# Patient Record
Sex: FEMALE | Race: WHITE | Employment: FULL TIME | URBAN - METROPOLITAN AREA
[De-identification: names, ages, dates, MRNs, and addresses within clinical notes are randomized per-mention and may not be internally consistent; named-entity substitution may affect disease eponyms.]

---

## 2017-01-27 ENCOUNTER — TELEPHONE (OUTPATIENT)
Dept: ORTHOPEDIC SURGERY | Age: 47
End: 2017-01-27

## 2017-05-22 ENCOUNTER — OFFICE VISIT (OUTPATIENT)
Dept: URGENT CARE | Age: 47
End: 2017-05-22

## 2017-05-22 VITALS
HEIGHT: 68 IN | SYSTOLIC BLOOD PRESSURE: 118 MMHG | HEART RATE: 77 BPM | OXYGEN SATURATION: 99 % | DIASTOLIC BLOOD PRESSURE: 74 MMHG | WEIGHT: 193 LBS | BODY MASS INDEX: 29.25 KG/M2 | TEMPERATURE: 97.7 F

## 2017-05-22 DIAGNOSIS — K12.1 STOMATITIS: Primary | ICD-10-CM

## 2017-05-22 PROCEDURE — 99202 OFFICE O/P NEW SF 15 MIN: CPT | Performed by: PHYSICIAN ASSISTANT

## 2017-05-23 ASSESSMENT — ENCOUNTER SYMPTOMS
HEARTBURN: 0
NAUSEA: 0
SORE THROAT: 0
ABDOMINAL PAIN: 0
VOMITING: 0

## 2017-09-28 ENCOUNTER — TELEPHONE (OUTPATIENT)
Dept: INTERNAL MEDICINE CLINIC | Age: 47
End: 2017-09-28

## 2018-10-31 ENCOUNTER — OFFICE VISIT (OUTPATIENT)
Dept: FAMILY MEDICINE CLINIC | Age: 48
End: 2018-10-31
Payer: COMMERCIAL

## 2018-10-31 VITALS
TEMPERATURE: 98.1 F | SYSTOLIC BLOOD PRESSURE: 128 MMHG | HEART RATE: 77 BPM | BODY MASS INDEX: 28.49 KG/M2 | OXYGEN SATURATION: 96 % | WEIGHT: 188 LBS | HEIGHT: 68 IN | DIASTOLIC BLOOD PRESSURE: 60 MMHG

## 2018-10-31 DIAGNOSIS — G89.29 CHRONIC PAIN OF LEFT THUMB: ICD-10-CM

## 2018-10-31 DIAGNOSIS — Z76.89 ENCOUNTER TO ESTABLISH CARE: Primary | ICD-10-CM

## 2018-10-31 DIAGNOSIS — G43.109 MIGRAINE WITH AURA AND WITHOUT STATUS MIGRAINOSUS, NOT INTRACTABLE: ICD-10-CM

## 2018-10-31 DIAGNOSIS — M25.512 CHRONIC LEFT SHOULDER PAIN: ICD-10-CM

## 2018-10-31 DIAGNOSIS — M79.645 CHRONIC PAIN OF LEFT THUMB: ICD-10-CM

## 2018-10-31 DIAGNOSIS — Z12.39 SCREENING FOR BREAST CANCER: ICD-10-CM

## 2018-10-31 DIAGNOSIS — Z13.1 SCREENING FOR DIABETES MELLITUS: ICD-10-CM

## 2018-10-31 DIAGNOSIS — E66.3 OVERWEIGHT (BMI 25.0-29.9): ICD-10-CM

## 2018-10-31 DIAGNOSIS — G89.29 CHRONIC LEFT SHOULDER PAIN: ICD-10-CM

## 2018-10-31 LAB
A/G RATIO: 1.5 (ref 1.1–2.2)
ALBUMIN SERPL-MCNC: 4.5 G/DL (ref 3.4–5)
ALP BLD-CCNC: 93 U/L (ref 40–129)
ALT SERPL-CCNC: 28 U/L (ref 10–40)
ANION GAP SERPL CALCULATED.3IONS-SCNC: 15 MMOL/L (ref 3–16)
AST SERPL-CCNC: 24 U/L (ref 15–37)
BILIRUB SERPL-MCNC: 0.4 MG/DL (ref 0–1)
BUN BLDV-MCNC: 14 MG/DL (ref 7–20)
CALCIUM SERPL-MCNC: 10 MG/DL (ref 8.3–10.6)
CHLORIDE BLD-SCNC: 104 MMOL/L (ref 99–110)
CHOLESTEROL, TOTAL: 249 MG/DL (ref 0–199)
CO2: 25 MMOL/L (ref 21–32)
CREAT SERPL-MCNC: 0.6 MG/DL (ref 0.6–1.1)
ESTIMATED AVERAGE GLUCOSE: 108.3 MG/DL
GFR AFRICAN AMERICAN: >60
GFR NON-AFRICAN AMERICAN: >60
GLOBULIN: 3 G/DL
GLUCOSE BLD-MCNC: 91 MG/DL (ref 70–99)
HBA1C MFR BLD: 5.4 %
HDLC SERPL-MCNC: 53 MG/DL (ref 40–60)
LDL CHOLESTEROL CALCULATED: 163 MG/DL
POTASSIUM SERPL-SCNC: 4.2 MMOL/L (ref 3.5–5.1)
SODIUM BLD-SCNC: 144 MMOL/L (ref 136–145)
TOTAL PROTEIN: 7.5 G/DL (ref 6.4–8.2)
TRIGL SERPL-MCNC: 163 MG/DL (ref 0–150)
VLDLC SERPL CALC-MCNC: 33 MG/DL

## 2018-10-31 PROCEDURE — 99203 OFFICE O/P NEW LOW 30 MIN: CPT | Performed by: FAMILY MEDICINE

## 2018-10-31 PROCEDURE — 36415 COLL VENOUS BLD VENIPUNCTURE: CPT | Performed by: FAMILY MEDICINE

## 2018-10-31 RX ORDER — SUMATRIPTAN 100 MG/1
TABLET, FILM COATED ORAL
Qty: 9 TABLET | Refills: 0 | Status: SHIPPED | OUTPATIENT
Start: 2018-10-31 | End: 2019-01-22 | Stop reason: SDUPTHER

## 2018-10-31 RX ORDER — ACETAMINOPHEN, ASPIRIN AND CAFFEINE 250; 250; 65 MG/1; MG/1; MG/1
1 TABLET, FILM COATED ORAL EVERY 6 HOURS PRN
COMMUNITY
End: 2022-08-12

## 2018-10-31 ASSESSMENT — ENCOUNTER SYMPTOMS
SORE THROAT: 0
ROS SKIN COMMENTS: ROSACEA
DIARRHEA: 0
SHORTNESS OF BREATH: 0
BLOOD IN STOOL: 0
NAUSEA: 0
BACK PAIN: 1
VOMITING: 0
RHINORRHEA: 0
ABDOMINAL PAIN: 0
CONSTIPATION: 0

## 2018-10-31 NOTE — PATIENT INSTRUCTIONS
under license by [facility] [address] If you have questions about a medical condition or this instruction, always ask your healthcare professional. [place] [address] any warranty or liability for your use of this information.

## 2018-10-31 NOTE — PROGRESS NOTES
done in 2016. Tdap and flu shot up to date. Will screen for lipids, diabetes today, get CMP based on BMI. Will review lab results with patient, return annually and for acute visits PRN. 1. Encounter to establish care  Chart reviewed, history and physical performed    2. Migraine with aura and without status migrainosus, not intractable  Rare since modified diet/caffiene intake; takes imitrex sparingly now; uses excedrin migraine; continue for now  - SUMAtriptan (IMITREX) 100 MG tablet; Take 0.5 tablet to tablet every 2 hours; max 2 tablets in 24 hours  Dispense: 9 tablet; Refill: 0    3. Screening for diabetes mellitus  Based on ADA/USPSTF recommendation for those overweight or obese 40-69 y/o every 3 years  - Hemoglobin A1C    4. Chronic pain of left thumb  Monitor for now, described in HPI; consider ortho referral if persists/worsens    5. Chronic left shoulder pain  Previously given CSI injection and improved, has improved with yoga/exercise    6. Overweight (BMI 25.0-29. 9)  Patient was asked about her current diet and exercise habits, and personalized advice was provided regarding recommended lifestyle changes. Patient's comorbid health conditions associated with elevated BMI were discussed, including migraines, as well as the likely benefits of weight loss. Based upon patient's motivation to continue  her behavior of exercise. The following plan was agreed upon to work toward a weight loss goal. 250-500 less calorie/day diet and increase physical activity as follows: continue yoga/ barre class 2-3 x a week. Educational materials for  weight loss were provided. Patient will follow-up in 12 month(s) with PCP. Provider spent 5 minutes counseling patient. - Comprehensive Metabolic Panel  - LIPID PANEL    7. Screening for breast cancer  Mammogram ordered, has required ultrasound follow up in past, will await results  - PAU DIGITAL SCREEN W OR WO CAD BILATERAL;  Future    Reviewed treatment plan with

## 2018-11-09 ENCOUNTER — PATIENT MESSAGE (OUTPATIENT)
Dept: FAMILY MEDICINE CLINIC | Age: 48
End: 2018-11-09

## 2018-11-09 DIAGNOSIS — J02.9 PHARYNGITIS, UNSPECIFIED ETIOLOGY: Primary | ICD-10-CM

## 2018-11-09 RX ORDER — AMOXICILLIN 500 MG/1
1000 TABLET, FILM COATED ORAL DAILY
Qty: 20 TABLET | Refills: 0 | Status: SHIPPED | OUTPATIENT
Start: 2018-11-09 | End: 2018-11-19

## 2019-01-22 DIAGNOSIS — G43.109 MIGRAINE WITH AURA AND WITHOUT STATUS MIGRAINOSUS, NOT INTRACTABLE: ICD-10-CM

## 2019-01-22 RX ORDER — SUMATRIPTAN 100 MG/1
TABLET, FILM COATED ORAL
Qty: 9 TABLET | Refills: 0 | Status: SHIPPED | OUTPATIENT
Start: 2019-01-22 | End: 2019-06-24 | Stop reason: SDUPTHER

## 2019-06-03 ENCOUNTER — OFFICE VISIT (OUTPATIENT)
Dept: PRIMARY CARE CLINIC | Age: 49
End: 2019-06-03
Payer: COMMERCIAL

## 2019-06-03 ENCOUNTER — HOSPITAL ENCOUNTER (OUTPATIENT)
Dept: GENERAL RADIOLOGY | Age: 49
Discharge: HOME | End: 2019-06-03
Payer: COMMERCIAL

## 2019-06-03 ENCOUNTER — HOSPITAL ENCOUNTER (OUTPATIENT)
Age: 49
Discharge: HOME | End: 2019-06-03
Payer: COMMERCIAL

## 2019-06-03 VITALS
SYSTOLIC BLOOD PRESSURE: 133 MMHG | RESPIRATION RATE: 12 BRPM | HEART RATE: 84 BPM | HEIGHT: 68 IN | DIASTOLIC BLOOD PRESSURE: 76 MMHG | WEIGHT: 202 LBS | TEMPERATURE: 96.9 F | BODY MASS INDEX: 30.62 KG/M2

## 2019-06-03 DIAGNOSIS — S50.12XA CONTUSION OF LEFT FOREARM, INITIAL ENCOUNTER: Primary | ICD-10-CM

## 2019-06-03 DIAGNOSIS — S50.12XA CONTUSION OF LEFT FOREARM, INITIAL ENCOUNTER: ICD-10-CM

## 2019-06-03 PROCEDURE — 73090 X-RAY EXAM OF FOREARM: CPT

## 2019-06-03 PROCEDURE — 99203 OFFICE O/P NEW LOW 30 MIN: CPT | Performed by: PHYSICIAN ASSISTANT

## 2019-06-03 ASSESSMENT — ENCOUNTER SYMPTOMS
COUGH: 0
SORE THROAT: 0
EYE REDNESS: 0
ABDOMINAL PAIN: 0
DIARRHEA: 0
EYE PAIN: 0
SINUS PRESSURE: 0
NAUSEA: 0
WHEEZING: 0
BACK PAIN: 0
SHORTNESS OF BREATH: 0

## 2019-06-03 NOTE — PROGRESS NOTES
appears well-developed and well-nourished. Musculoskeletal:        Arms:  Neurological: She is alert and oriented to person, place, and time. Skin: Skin is warm and dry. Psychiatric: She has a normal mood and affect. Assessment:    1. Contusion of left forearm, initial encounter        Plan:    X-ray of L forearm ordered and done at [facility] = negative for acute fracture. ACE wrap applied.   RICE and f/u prn

## 2019-06-20 ENCOUNTER — OFFICE VISIT (OUTPATIENT)
Dept: PRIMARY CARE CLINIC | Age: 49
End: 2019-06-20
Payer: COMMERCIAL

## 2019-06-20 VITALS
HEIGHT: 68 IN | TEMPERATURE: 98 F | DIASTOLIC BLOOD PRESSURE: 81 MMHG | SYSTOLIC BLOOD PRESSURE: 127 MMHG | BODY MASS INDEX: 30.65 KG/M2 | HEART RATE: 85 BPM | WEIGHT: 202.2 LBS | OXYGEN SATURATION: 97 %

## 2019-06-20 DIAGNOSIS — K04.7 DENTAL ABSCESS: Primary | ICD-10-CM

## 2019-06-20 PROCEDURE — 99213 OFFICE O/P EST LOW 20 MIN: CPT | Performed by: PHYSICIAN ASSISTANT

## 2019-06-20 RX ORDER — AMOXICILLIN AND CLAVULANATE POTASSIUM 875; 125 MG/1; MG/1
1 TABLET, FILM COATED ORAL 2 TIMES DAILY
Qty: 14 TABLET | Refills: 0 | Status: SHIPPED | OUTPATIENT
Start: 2019-06-20 | End: 2019-06-27

## 2019-06-20 ASSESSMENT — ENCOUNTER SYMPTOMS: FACIAL SWELLING: 1

## 2019-06-20 NOTE — PATIENT INSTRUCTIONS
Patient Education        Abscessed Tooth: Care Instructions  Your Care Instructions    An abscessed tooth is a tooth that has a pocket of pus in the tissues around it. Pus forms when the body tries to fight an infection caused by bacteria. If the pus cannot drain, it forms an abscess. An abscessed tooth can cause red, swollen gums and throbbing pain, especially when you chew. You may have a bad taste in your mouth and a fever, and your jaw may swell. Damage to the tooth, untreated tooth decay, or gum disease can cause an abscessed tooth. An abscessed tooth needs to be treated by a dental professional right away. If it is not treated, the infection could spread to other parts of your body. Your dentist will give you antibiotics to stop the infection. He or she may make a hole in the tooth or cut open (hemal) the abscess inside your mouth so that the infection can drain, which should relieve your pain. You may need to have a root canal treatment, which tries to save your tooth by taking out the infected pulp and replacing it with a healing medicine and/or a filling. If these treatments do not work, your tooth may have to be removed. Follow-up care is a key part of your treatment and safety. Be sure to make and go to all appointments, and call your doctor if you are having problems. It's also a good idea to know your test results and keep a list of the medicines you take. How can you care for yourself at home? · Reduce pain and swelling in your face and jaw by putting ice or a cold pack on the outside of your cheek for 10 to 20 minutes at a time. Put a thin cloth between the ice and your skin. · Take pain medicines exactly as directed. ? If the doctor gave you a prescription medicine for pain, take it as prescribed. ? If you are not taking a prescription pain medicine, ask your doctor if you can take an over-the-counter medicine. · Take your antibiotics as directed.  Do not stop taking them just because you feel better. You need to take the full course of antibiotics. To prevent tooth abscess  · Brush and floss every day, and have regular dental checkups. · Eat a healthy diet, and avoid sugary foods and drinks. · Do not smoke, use e-cigarettes with nicotine, or use spit tobacco. Tobacco and nicotine slow your ability to heal. Tobacco also increases your risk for gum disease and cancer of the mouth and throat. If you need help quitting, talk to your doctor about stop-smoking programs and medicines. These can increase your chances of quitting for good. When should you call for help? Call 911 anytime you think you may need emergency care. For example, call if:    · You have trouble breathing.    Call your doctor now or seek immediate medical care if:    · You have new or worse symptoms of infection, such as:  ? Increased pain, swelling, warmth, or redness. ? Red streaks leading from the area. ? Pus draining from the area. ? A fever.    Watch closely for changes in your health, and be sure to contact your doctor if:    · You do not get better as expected. Where can you learn more? Go to https://Thinknum.AMCAD. org and sign in to your The Campaign Solution account. Enter N211 in the [facility] box to learn more about \"Abscessed Tooth: Care Instructions. \"     If you do not have an account, please click on the \"Sign Up Now\" link. Current as of: October 3, 2018  Content Version: 12.0  © 5466-1022 Healthwise, Incorporated. Care instructions adapted under license by [facility] ([facility]). If you have questions about a medical condition or this instruction, always ask your healthcare professional. [place] [address] any warranty or liability for your use of this information.

## 2019-06-20 NOTE — PROGRESS NOTES
Reason for Visit:   Chief Complaint   Patient presents with    Oral Swelling     x 2 days     Patient History     Pt c/o pain of L cheek/ gum pain that started the day before yesterday. She has an appt with her dentist but was unable to get in today. She feels that she may have an abscess forming as she also has some swelling. Denies fever or chills. Past Medical History:   Diagnosis Date    Migraines     uses imitrex as needed       Past Surgical History:   Procedure Laterality Date    ABDOMEN SURGERY      APPENDECTOMY  1983    BREAST REDUCTION SURGERY  12-5-2012    AMBER BREAST REDUCTION; ABDOMINOPLASTY    ENDOMETRIAL ABLATION  2007    MOUTH SURGERY  1988       Allergies: Allergies   Allergen Reactions    Oxycodone-Acetaminophen Itching    Oxycodone-Acetaminophen Itching       Review of Systems     ROS: Please refer to HPI for anypertinent positive findings, as all other systems were reviewed as negative unless otherwise listed above. Review of Systems   Constitutional: Negative for chills and fever. HENT: Positive for dental problem and facial swelling. Skin: Negative for rash. Hematological: Negative for adenopathy. Physical Exam     Vitals:    06/20/19 0823   BP: 127/81   Pulse: 85   Temp: 98 °F (36.7 °C)   SpO2: 97%         Physical Exam   Constitutional: She is oriented to person, place, and time. She appears well-developed and well-nourished. HENT:   Head:       Mouth/Throat: Oropharynx is clear and moist. No oral lesions. No oropharyngeal exudate. Neurological: She is alert and oriented to person, place, and time. Psychiatric: She has a normal mood and affect. Assessment:    1. Dental abscess  - amoxicillin-clavulanate (AUGMENTIN) 875-125 MG per tablet; Take 1 tablet by mouth 2 times daily for 7 days  Dispense: 14 tablet;  Refill: 0      Plan:    F/u w/ Dentist as planned, here prn

## 2019-09-19 DIAGNOSIS — G43.109 MIGRAINE WITH AURA AND WITHOUT STATUS MIGRAINOSUS, NOT INTRACTABLE: ICD-10-CM

## 2019-09-19 RX ORDER — SUMATRIPTAN 100 MG/1
TABLET, FILM COATED ORAL
Qty: 9 TABLET | Refills: 0 | Status: SHIPPED | OUTPATIENT
Start: 2019-09-19 | End: 2019-11-25 | Stop reason: SDUPTHER

## 2019-11-25 DIAGNOSIS — G43.109 MIGRAINE WITH AURA AND WITHOUT STATUS MIGRAINOSUS, NOT INTRACTABLE: ICD-10-CM

## 2019-11-25 RX ORDER — SUMATRIPTAN 100 MG/1
100 TABLET, FILM COATED ORAL
Qty: 9 TABLET | Refills: 0 | Status: SHIPPED | OUTPATIENT
Start: 2019-11-25 | End: 2020-01-30 | Stop reason: SDUPTHER

## 2020-01-30 ENCOUNTER — OFFICE VISIT (OUTPATIENT)
Dept: FAMILY MEDICINE CLINIC | Age: 50
End: 2020-01-30
Payer: COMMERCIAL

## 2020-01-30 VITALS
OXYGEN SATURATION: 98 % | HEART RATE: 85 BPM | WEIGHT: 203 LBS | TEMPERATURE: 98.5 F | HEIGHT: 68 IN | SYSTOLIC BLOOD PRESSURE: 110 MMHG | BODY MASS INDEX: 30.77 KG/M2 | DIASTOLIC BLOOD PRESSURE: 60 MMHG

## 2020-01-30 PROBLEM — M79.645 CHRONIC THUMB PAIN, LEFT: Status: ACTIVE | Noted: 2020-01-30

## 2020-01-30 PROBLEM — G89.29 CHRONIC THUMB PAIN, LEFT: Status: ACTIVE | Noted: 2020-01-30

## 2020-01-30 PROCEDURE — 99214 OFFICE O/P EST MOD 30 MIN: CPT | Performed by: FAMILY MEDICINE

## 2020-01-30 RX ORDER — SUMATRIPTAN 100 MG/1
TABLET, FILM COATED ORAL
Qty: 9 TABLET | Refills: 0 | Status: SHIPPED | OUTPATIENT
Start: 2020-01-30 | End: 2020-04-01

## 2020-01-30 ASSESSMENT — PATIENT HEALTH QUESTIONNAIRE - PHQ9
SUM OF ALL RESPONSES TO PHQ QUESTIONS 1-9: 0
SUM OF ALL RESPONSES TO PHQ QUESTIONS 1-9: 0
SUM OF ALL RESPONSES TO PHQ9 QUESTIONS 1 & 2: 0
1. LITTLE INTEREST OR PLEASURE IN DOING THINGS: 0
2. FEELING DOWN, DEPRESSED OR HOPELESS: 0

## 2020-01-30 ASSESSMENT — ENCOUNTER SYMPTOMS
SORE THROAT: 0
SHORTNESS OF BREATH: 0
ABDOMINAL PAIN: 0
COLOR CHANGE: 0
EYE PAIN: 0
BACK PAIN: 0
DIARRHEA: 0
CONSTIPATION: 0
BLOOD IN STOOL: 0
EYE DISCHARGE: 0
RHINORRHEA: 0
WHEEZING: 0

## 2020-01-30 NOTE — PATIENT INSTRUCTIONS
stay in the shade or cover up with clothing and a hat with a wide brim. Wear sunglasses that block UV rays. Even when it's cloudy, put broad-spectrum sunscreen (SPF 30 or higher) on any exposed skin. · See a dentist one or two times a year for checkups and to have your teeth cleaned. · Wear a seat belt in the car. Follow your doctor's advice about when to have certain tests. These tests can spot problems early. For everyone  · Cholesterol. Have the fat (cholesterol) in your blood tested after age 21. Your doctor will tell you how often to have this done based on your age, family history, or other things that can increase your risk for heart disease. · Blood pressure. Have your blood pressure checked during a routine doctor visit. Your doctor will tell you how often to check your blood pressure based on your age, your blood pressure results, and other factors. · Vision. Talk with your doctor about how often to have a glaucoma test.  · Diabetes. Ask your doctor whether you should have tests for diabetes. · Colon cancer. Your risk for colorectal cancer gets higher as you get older. Some experts say that adults should start regular screening at age 48 and stop at age 76. Others say to start before age 48 or continue after age 76. Talk with your doctor about your risk and when to start and stop screening. For women  · Breast exam and mammogram. Talk to your doctor about when you should have a clinical breast exam and a mammogram. Medical experts differ on whether and how often women under 50 should have these tests. Your doctor can help you decide what is right for you. · Cervical cancer screening test and pelvic exam. Begin with a Pap test at age 24. The test often is part of a pelvic exam. Starting at age 27, you may choose to have a Pap test, an HPV test, or both tests at the same time (called co-testing). Talk with your doctor about how often to have testing.   · Tests for sexually transmitted infections (STIs). Ask whether you should have tests for STIs. You may be at risk if you have sex with more than one person, especially if your partners do not wear condoms. For men  · Tests for sexually transmitted infections (STIs). Ask whether you should have tests for STIs. You may be at risk if you have sex with more than one person, especially if you do not wear a condom. · Testicular cancer exam. Ask your doctor whether you should check your testicles regularly. · Prostate exam. Talk to your doctor about whether you should have a blood test (called a PSA test) for prostate cancer. Experts differ on whether and when men should have this test. Some experts suggest it if you are older than 39 and are -American or have a father or brother who got prostate cancer when he was younger than 72. When should you call for help? Watch closely for changes in your health, and be sure to contact your doctor if you have any problems or symptoms that concern you. Where can you learn more? Go to https://Diatherix Laboratoriestosin.healthHadapt. org and sign in to your Infoblox account. Enter P072 in the [employer] box to learn more about \"Well Visit, Ages 25 to 48: Care Instructions. \"     If you do not have an account, please click on the \"Sign Up Now\" link. Current as of: August 21, 2019  Content Version: 12.3  © 8926-1092 Healthwise, Incorporated. Care instructions adapted under license by [facility] ([facility]). If you have questions about a medical condition or this instruction, always ask your healthcare professional. [place] [address] any warranty or liability for your use of this information.

## 2020-01-30 NOTE — PROGRESS NOTES
Dementia Mother     Heart Disease Father     Cancer Maternal Aunt     Cancer Paternal Aunt     Cancer Brother         esophageal and stomach CA x 6 years, multiple myeloma       Current Outpatient Medications   Medication Sig Dispense Refill    SUMAtriptan (IMITREX) 100 MG tablet TAKE ONE - HALF TABLET BY MOUTH EVERY TWO HOURS AS NEEDED. MAX 2 TABLETS IN 24 HOURS 9 tablet 0    aspirin-acetaminophen-caffeine (EXCEDRIN MIGRAINE) 633-236-55 MG per tablet Take 1 tablet by mouth every 6 hours as needed for Headaches       No current facility-administered medications for this visit. Allergies   Allergen Reactions    Oxycodone-Acetaminophen Itching    Oxycodone-Acetaminophen Itching       Social History     Socioeconomic History    Marital status:      Spouse name: Not on file    Number of children: Not on file    Years of education: Not on file    Highest education level: Not on file   Occupational History    Not on file   Social Needs    Financial resource strain: Not on file    Food insecurity:     Worry: Not on file     Inability: Not on file    Transportation needs:     Medical: Not on file     Non-medical: Not on file   Tobacco Use    Smoking status: Never Smoker    Smokeless tobacco: Never Used   Substance and Sexual Activity    Alcohol use:  Yes     Alcohol/week: 0.0 standard drinks     Comment: socially occasionally    Drug use: No    Sexual activity: Yes     Partners: Male   Lifestyle    Physical activity:     Days per week: Not on file     Minutes per session: Not on file    Stress: Not on file   Relationships    Social connections:     Talks on phone: Not on file     Gets together: Not on file     Attends Worship service: Not on file     Active member of club or organization: Not on file     Attends meetings of clubs or organizations: Not on file     Relationship status: Not on file    Intimate partner violence:     Fear of current or ex partner: Not on file about her diagnoses and treatment plans, preventative health, and other matters suggested an appointment today    Reviewed treatment plan with patient. Patient verbalized understanding to treatment plan and questions were answered. Return in about 1 year (around 1/30/2021) for annual well exam, adam. Barbara Farah.  Alireza Thapa.      1/30/2020

## 2020-02-10 ENCOUNTER — OFFICE VISIT (OUTPATIENT)
Dept: SURGERY | Age: 50
End: 2020-02-10
Payer: COMMERCIAL

## 2020-02-10 VITALS
HEIGHT: 68 IN | HEART RATE: 72 BPM | SYSTOLIC BLOOD PRESSURE: 111 MMHG | WEIGHT: 198 LBS | BODY MASS INDEX: 30.01 KG/M2 | DIASTOLIC BLOOD PRESSURE: 69 MMHG

## 2020-02-10 PROCEDURE — 99241 PR OFFICE CONSULTATION NEW/ESTAB PATIENT 15 MIN: CPT | Performed by: SURGERY

## 2020-02-10 NOTE — PROGRESS NOTES
New Patient Via Jalil Nickerson MD    [address], [address] [facility]  [place]  [phone]    Destiny Domínguez   YOB: 1970    Date of Visit:  2/10/2020    Celestine Orellana. Apryl Penriccardo., DO    Chief Complaint: Scalp lumps    HPI: Presents for evaluation of a couple lumps on her scalp. She states she has had these before. She had some removed in the past.  They have slowly grown larger over the course of the past few years and are now bothersome to her. She has a pressure type pain over these. They have not been infected or drained. Allergies   Allergen Reactions    Oxycodone-Acetaminophen Itching    Oxycodone-Acetaminophen Itching     Outpatient Medications Marked as Taking for the 2/10/20 encounter (Office Visit) with Kimberli Shaw MD   Medication Sig Dispense Refill    SUMAtriptan (IMITREX) 100 MG tablet TAKE ONE - HALF TABLET BY MOUTH EVERY TWO HOURS AS NEEDED.  MAX 2 TABLETS IN 24 HOURS 9 tablet 0    aspirin-acetaminophen-caffeine (EXCEDRIN MIGRAINE) 653-033-32 MG per tablet Take 1 tablet by mouth every 6 hours as needed for Headaches         Past Medical History:   Diagnosis Date    Migraines     uses imitrex as needed     Past Surgical History:   Procedure Laterality Date    ABDOMEN SURGERY      APPENDECTOMY  1983    BREAST REDUCTION SURGERY  12-5-2012    AMBER BREAST REDUCTION; ABDOMINOPLASTY    ENDOMETRIAL ABLATION  2007    MOUTH SURGERY  1988     Family History   Problem Relation Age of Onset    Heart Disease Mother         4 stents, aortic valve replacement     Dementia Mother     Heart Disease Father     Cancer Maternal Aunt     Cancer Paternal Aunt     Cancer Brother         esophageal and stomach CA x 6 years, multiple myeloma     Social History     Socioeconomic History    Marital status:      Spouse name: Not on file    Number of children: Not on file    Years of education: nose normal. Neck- normal range of motion, no tenderness, supple   Respiratory:  No respiratory distress, normal breath sounds, no rales, no wheezing   Cardiovascular:  Normal rate, normal rhythm  Integument: Scalp demonstrates 2 sebaceous cyst.  Each measured about 1.5 cm  Lymphatic:  No lymphadenopathy noted   Neurologic:  Alert & oriented x 3, no focal deficits noted   Psychiatric:  Speech and behavior appropriate       DATA:  N/A      Assessment:  1. Sebaceous cyst        Plan: Excision of sebaceous cysts. I explained the procedure including risks, benefits, and alternatives. Questions were answered and the patient agrees to proceed.

## 2020-02-12 ENCOUNTER — HOSPITAL ENCOUNTER (OUTPATIENT)
Dept: MAMMOGRAPHY | Age: 50
Discharge: HOME | End: 2020-02-12
Payer: COMMERCIAL

## 2020-02-12 ENCOUNTER — TELEPHONE (OUTPATIENT)
Dept: SURGERY | Age: 50
End: 2020-02-12

## 2020-02-12 PROCEDURE — 77063 BREAST TOMOSYNTHESIS BI: CPT

## 2020-02-12 NOTE — TELEPHONE ENCOUNTER
I called and spoke to pt, she is fine with scheduling with Dr. Abbasi Neighbor, she is ok coming here to [place], she has 2 scalp cysts. Thanks.

## 2020-02-12 NOTE — TELEPHONE ENCOUNTER
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 She has two cysts on her head. Lorenzo Ghanshyam has taken some off before. She didn't want to come out to [facility], so I saw her in office today. I explained that it is less of a hassle to have them removed in specialized services, and she is fine with that. If a Tuesday doesn't work for her, it is fine if Lorenzo Morrissey does it on one of his days. That may even be better, so she can talk to him about using Dragon!  Need the standard 20 minutes local for 2 scalp cysts

## 2020-02-12 NOTE — TELEPHONE ENCOUNTER
Dr. Sonya Mchugh, please see Dr. Billingsley Model message below. Is this ok to schedule with you?  If so, do you want to see her in the office first?

## 2020-02-20 PROBLEM — L72.9 SCALP CYST: Status: ACTIVE | Noted: 2020-02-20

## 2020-05-14 ENCOUNTER — OFFICE VISIT (OUTPATIENT)
Dept: PRIMARY CARE CLINIC | Age: 50
End: 2020-05-14

## 2020-05-14 NOTE — PATIENT INSTRUCTIONS
Advance Care Planning  People with COVID-19 may have no symptoms, mild symptoms, such as fever, cough, and shortness of breath or they may have more severe illness, developing severe and fatal pneumonia. As a result, Advance Care Planning with attention to naming a health care decision maker (someone you trust to make healthcare decisions for you if you could not speak for yourself) and sharing other health care preferences is important BEFORE a possible health crisis. Please contact your Primary Care Provider to discuss Advance Care Planning. Preventing the Spread of Coronavirus Disease 2019 in Homes and Residential Communities  For the most recent information go to [email or web address]    Prevention steps for People with confirmed or suspected COVID-19 (including persons under investigation) who do not need to be hospitalized  and   People with confirmed COVID-19 who were hospitalized and determined to be medically stable to go home    Your healthcare provider and public health staff will evaluate whether you can be cared for at home. If it is determined that you do not need to be hospitalized and can be isolated at home, you will be monitored by staff from your local or state health department. You should follow the prevention steps below until a healthcare provider or local or state health department says you can return to your normal activities. Stay home except to get medical care  People who are mildly ill with COVID-19 are able to isolate at home during their illness. You should restrict activities outside your home, except for getting medical care. Do not go to work, school, or public areas. Avoid using public transportation, ride-sharing, or taxis. Separate yourself from other people and animals in your home  People: As much as possible, you should stay in a specific room and away from other people in your home.  Also, you should use a separate have a medical emergency and need to call 911, notify the dispatch personnel that you have, or are being evaluated for COVID-19. If possible, put on a facemask before emergency medical services arrive. Discontinuing home isolation  Patients with confirmed COVID-19 should remain under home isolation precautions until the risk of secondary transmission to others is thought to be low. The decision to discontinue home isolation precautions should be made on a case-by-case basis, in consultation with healthcare providers and state and local health departments. Thank you for enrolling in [address] Please follow the instructions below to securely access your online medical record. IntellinX allows you to send messages to your doctor, view your test results, renew your prescriptions, schedule appointments, and more. How Do I Sign Up? 1. In your Internet browser, go to [email or web address]  2. Click on the Sign Up Now link in the Sign In box. You will see the New Member Sign Up page. 3. Enter your IntellinX Access Code exactly as it appears below. You will not need to use this code after youve completed the sign-up process. If you do not sign up before the expiration date, you must request a new code. IntellinX Access Code: Activation code not generated  Current IntellinX Status: Active    4. Enter your Social Security Number ([id number]) and Date of Birth (mm/dd/yyyy) as indicated and click Submit. You will be taken to the next sign-up page. 5. Create a IntellinX ID. This will be your IntellinX login ID and cannot be changed, so think of one that is secure and easy to remember. 6. Create a IntellinX password. You can change your password at any time. 7. Enter your Password Reset Question and Answer. This can be used at a later time if you forget your password. 8. Enter your e-mail address. You will receive e-mail notification when new information is available in [address] 9. Click Sign Up.

## 2020-05-14 NOTE — PROGRESS NOTES
Patient is having a Cyst Removal wi Dr. Alma Villarreal on 5/18/2020 @ [facility] and was seen at clinic for pre op covid test. . Patient instructed to self quarantine until the procedure. 5/14/2020    FLU/COVID-19 CLINIC EVALUATION    HPI: Needs test before surgery  SYMPTOMS: asymptomatic    Symptom duration, days:  [] 1   [] 2   [] 3   [] 4   [] 5   [] 6   [] 7   [] 8   [] 9   [] 10   [] 11   [] 12   [] 13 [] 14 +      Symptom course:   [] Worsening     [] Stable     [] Improving    [] Fevers  [] Symptom (not measured)  [] Measured (Result: )  [] Chills  [] Cough  [] Coughing up blood  [] Productive  [] Dry  [] Chest Congestion  [] Chest Tightness  [] Nasal Congestion  [] Runny Nose  [] Feeling short of breath  [] Fatigue  [] Chest pain  [] Headaches  []Tolerable  [] Severe  [] Sore throat  [] Muscle aches  [] Nausea  [] Decreased appetite  [] Vomiting  []Unable to keep fluids down  [] Diarrhea  []Severe    [] OTHER SYMPTOMS:      RISK FACTORS:  [] Pregnant or possibly pregnant  [] Age over 61  [] Diabetes  [] Heart disease  [] Asthma  [] COPD/Other chronic lung diseases  [] Active Cancer  [] On Chemotherapy  [] Taking oral steroids  [] History Lymphoma/Leukemia  [] Close contact with a lab confirmed COVID-19 patient within 14 days of symptom onset  [] History of travel from affected geographical areas within 14 days of symptom onset  [] Health Care Worker Exposure no symptoms  [] Health Care Worker Exposure symptomatic      VITALS:  There were no vitals filed for this visit.      PHYSICAL EXAMINATION:  [x]Alert   [x]Oriented to person/place/time    [x]No apparent distress     []Toxic appearing    [x]Breathing appears normal     []Appears tachypneic         [x]Speaking in full sentences     TESTS:  POCT FLU:  [] Positive     []Negative  POCT STREP:  [] Positive     []Negative    [x] COVID-19 Test sent: [x] Blue   [] Red   [] Chest X-ray     ASSESSMENT:  [] Flu  [] Strep Throat  [] Uncertain Viral Respiratory Illness  [x] Possible COVID-19  [] Exposure COVID-19  [] Other:     PLAN:  [] Discharge home with written instructions for:  [] Flu management  [] Strep throat management  [] Possible COVID-19 exposure with self-quarantine   [x] Possible COVID-19 with isolation instructions and management of symptoms  [x] Follow-up with primary care physician or emergency department if worsens  [] Referred to emergency department for evaluation    [] Evaluation per physician/nurse practitioner in clinic  [] Prescription sent in  [x] No Prescription sent in    An  electronic signature was used to authenticate this note.      --Garrett Segura ATC on 5/14/2020 at 3:46 PM

## 2020-05-16 LAB — SARS-COV-2, PCR: NOT DETECTED

## 2020-05-18 ENCOUNTER — HOSPITAL ENCOUNTER (OUTPATIENT)
Age: 50
Setting detail: OUTPATIENT SURGERY
Discharge: HOME | End: 2020-05-18
Attending: SURGERY | Admitting: SURGERY
Payer: COMMERCIAL

## 2020-05-18 VITALS
WEIGHT: 190 LBS | SYSTOLIC BLOOD PRESSURE: 117 MMHG | HEART RATE: 69 BPM | HEIGHT: 68 IN | RESPIRATION RATE: 16 BRPM | OXYGEN SATURATION: 97 % | TEMPERATURE: 96.9 F | BODY MASS INDEX: 28.79 KG/M2 | DIASTOLIC BLOOD PRESSURE: 77 MMHG

## 2020-05-18 PROCEDURE — 7100000010 HC PHASE II RECOVERY - FIRST 15 MIN: Performed by: SURGERY

## 2020-05-18 PROCEDURE — 2500000003 HC RX 250 WO HCPCS: Performed by: SURGERY

## 2020-05-18 PROCEDURE — 88305 TISSUE EXAM BY PATHOLOGIST: CPT

## 2020-05-18 PROCEDURE — 11422 EXC H-F-NK-SP B9+MARG 1.1-2: CPT | Performed by: SURGERY

## 2020-05-18 PROCEDURE — 88304 TISSUE EXAM BY PATHOLOGIST: CPT

## 2020-05-18 PROCEDURE — 3600000012 HC SURGERY LEVEL 2 ADDTL 15MIN: Performed by: SURGERY

## 2020-05-18 PROCEDURE — 2709999900 HC NON-CHARGEABLE SUPPLY: Performed by: SURGERY

## 2020-05-18 PROCEDURE — 7100000011 HC PHASE II RECOVERY - ADDTL 15 MIN: Performed by: SURGERY

## 2020-05-18 PROCEDURE — 12031 INTMD RPR S/A/T/EXT 2.5 CM/<: CPT | Performed by: SURGERY

## 2020-05-18 PROCEDURE — 3600000002 HC SURGERY LEVEL 2 BASE: Performed by: SURGERY

## 2020-05-18 PROCEDURE — 11401 EXC TR-EXT B9+MARG 0.6-1 CM: CPT | Performed by: SURGERY

## 2020-05-18 ASSESSMENT — PAIN - FUNCTIONAL ASSESSMENT: PAIN_FUNCTIONAL_ASSESSMENT: 0-10

## 2020-05-18 NOTE — OP NOTE
[address]                 [address] [address]                                OPERATIVE REPORT    PATIENT NAME: AYSHA LOWE                 :        1970  MED REC NO:   [id number]                          ROOM:  ACCOUNT NO:   [de-identified]                           ADMIT DATE: 2020  PROVIDER:     Elena Bassett MD    DATE OF PROCEDURE:  2020    PREOPERATIVE DIAGNOSES:  1.  Scalp cyst x2.  2.  Skin neoplasm, uncertain behavior, left lower extremity. POSTOPERATIVE DIAGNOSES:  1.  Scalp cyst x2.  2.  Skin neoplasm, uncertain behavior, left lower extremity. OPERATIONS PERFORMED:  1. Excision of scalp cyst x2.  2.  Excision skin neoplasm, uncertain behavior, left lower extremity. SURGEON:  Elena Bassett MD    ANESTHESIA:  Local.    COMPLICATIONS:  None. ESTIMATED BLOOD LOSS:  Less than 50 mL. INDICATIONS FOR OPERATION:  A 57-year-old female with enlarging masses  on the scalp. These were causing acute pain and skin sensation  disturbance. She also had a raised irregular, pigmented lesion on the  left lower extremity, it was causing acute pain and skin sensation  disturbance. I recommended operative excision of these areas. The  risks and benefits were explained. The patient understood them,  accepted them, and elected to proceed. DESCRIPTION OF OPERATION:  The patient was brought to the operating  room. She was placed supine on the operating room table. She was  prepped and draped. Local anesthetic was infiltrated at two spots on  the scalp. Incisions were made. The excised diameter was 1.5 cm. I  then removed two scalp cysts in their entirety. Hemostasis was  obtained. Simple closures were performed with 3-0 nylon suture. On the  left lower extremity, in the mid portion between the knee and the ankle,  there was a pigmented lesion. Local anesthetic was infiltrated.   An  elliptical incision was made

## 2020-05-18 NOTE — PROGRESS NOTES
Reviewed discharge instructions with pt, verbalized understanding,  Denies questions at this time. Ambulated off unit without assistance.
baseline levels established preoperatively. 23.  The patient/caregiver demonstrates knowledge of the expected responses to the operative or invasive procedure. 20.  Patient/Caregiver has reduced anxiety. Interventions- Familiarize with environment and equipment.   21. Other:  22. Other:

## 2020-05-19 RX ORDER — SUMATRIPTAN 100 MG/1
TABLET, FILM COATED ORAL
Qty: 9 TABLET | Refills: 0 | Status: SHIPPED | OUTPATIENT
Start: 2020-05-19 | End: 2020-08-18 | Stop reason: SDUPTHER

## 2020-05-20 ENCOUNTER — TELEPHONE (OUTPATIENT)
Dept: SURGERY | Age: 50
End: 2020-05-20

## 2020-05-21 NOTE — TELEPHONE ENCOUNTER
Patient informed. She states she is doing great. She has a niece that is a RN, so she is going to have her remove the sutures. She states she will call if any problems or concerns.

## 2020-08-18 RX ORDER — SUMATRIPTAN 100 MG/1
TABLET, FILM COATED ORAL
Qty: 9 TABLET | Refills: 0 | Status: SHIPPED | OUTPATIENT
Start: 2020-08-18 | End: 2021-01-06 | Stop reason: SDUPTHER

## 2020-08-18 NOTE — TELEPHONE ENCOUNTER
Refill Request SUMAtriptan (IMITREX) 100 MG tablet     Last Seen: 1/30/20    Last Written: 5/19/20 9 tablets with 0 refills    Next Appointment: Do January 2021  No future appointments.         Requested Prescriptions     Pending Prescriptions Disp Refills    SUMAtriptan (IMITREX) 100 MG tablet 9 tablet 0     Sig: TAKE ONE-HALF TO ONE TABLET BY MOUTH EVERY 2 HOURS AS NEEDED MAX 2 TABLETS IN 24 HOURS

## 2021-01-06 ENCOUNTER — OFFICE VISIT (OUTPATIENT)
Dept: PRIMARY CARE CLINIC | Age: 51
End: 2021-01-06
Payer: COMMERCIAL

## 2021-01-06 VITALS
HEIGHT: 68 IN | DIASTOLIC BLOOD PRESSURE: 76 MMHG | TEMPERATURE: 97.6 F | BODY MASS INDEX: 31.46 KG/M2 | SYSTOLIC BLOOD PRESSURE: 118 MMHG | OXYGEN SATURATION: 98 % | WEIGHT: 207.6 LBS | HEART RATE: 82 BPM

## 2021-01-06 DIAGNOSIS — E66.9 OBESITY (BMI 30-39.9): ICD-10-CM

## 2021-01-06 DIAGNOSIS — Z12.11 COLON CANCER SCREENING: ICD-10-CM

## 2021-01-06 DIAGNOSIS — Z00.00 ANNUAL PHYSICAL EXAM: Primary | ICD-10-CM

## 2021-01-06 DIAGNOSIS — G43.109 MIGRAINE WITH AURA AND WITHOUT STATUS MIGRAINOSUS, NOT INTRACTABLE: ICD-10-CM

## 2021-01-06 PROCEDURE — 99396 PREV VISIT EST AGE 40-64: CPT | Performed by: FAMILY MEDICINE

## 2021-01-06 RX ORDER — SUMATRIPTAN 100 MG/1
TABLET, FILM COATED ORAL
Qty: 9 TABLET | Refills: 5 | Status: SHIPPED | OUTPATIENT
Start: 2021-01-06 | End: 2021-12-20

## 2021-01-06 SDOH — SOCIAL STABILITY: SOCIAL NETWORK: HOW OFTEN DO YOU GET TOGETHER WITH FRIENDS OR RELATIVES?: TWICE A WEEK

## 2021-01-06 SDOH — ECONOMIC STABILITY: FOOD INSECURITY: WITHIN THE PAST 12 MONTHS, THE FOOD YOU BOUGHT JUST DIDN'T LAST AND YOU DIDN'T HAVE MONEY TO GET MORE.: NEVER TRUE

## 2021-01-06 SDOH — SOCIAL STABILITY: SOCIAL INSECURITY: WITHIN THE LAST YEAR, HAVE YOU BEEN HUMILIATED OR EMOTIONALLY ABUSED IN OTHER WAYS BY YOUR PARTNER OR EX-PARTNER?: NO

## 2021-01-06 SDOH — HEALTH STABILITY: MENTAL HEALTH
STRESS IS WHEN SOMEONE FEELS TENSE, NERVOUS, ANXIOUS, OR CAN'T SLEEP AT NIGHT BECAUSE THEIR MIND IS TROUBLED. HOW STRESSED ARE YOU?: NOT AT ALL

## 2021-01-06 SDOH — SOCIAL STABILITY: SOCIAL NETWORK: HOW OFTEN DO YOU ATTEND CHURCH OR RELIGIOUS SERVICES?: NEVER

## 2021-01-06 SDOH — HEALTH STABILITY: PHYSICAL HEALTH: ON AVERAGE, HOW MANY DAYS PER WEEK DO YOU ENGAGE IN MODERATE TO STRENUOUS EXERCISE (LIKE A BRISK WALK)?: 3 DAYS

## 2021-01-06 SDOH — SOCIAL STABILITY: SOCIAL INSECURITY
WITHIN THE LAST YEAR, HAVE TO BEEN RAPED OR FORCED TO HAVE ANY KIND OF SEXUAL ACTIVITY BY YOUR PARTNER OR EX-PARTNER?: NO

## 2021-01-06 SDOH — ECONOMIC STABILITY: INCOME INSECURITY: HOW HARD IS IT FOR YOU TO PAY FOR THE VERY BASICS LIKE FOOD, HOUSING, MEDICAL CARE, AND HEATING?: NOT HARD AT ALL

## 2021-01-06 SDOH — ECONOMIC STABILITY: TRANSPORTATION INSECURITY
IN THE PAST 12 MONTHS, HAS LACK OF TRANSPORTATION KEPT YOU FROM MEETINGS, WORK, OR FROM GETTING THINGS NEEDED FOR DAILY LIVING?: NO

## 2021-01-06 SDOH — SOCIAL STABILITY: SOCIAL NETWORK: IN A TYPICAL WEEK, HOW MANY TIMES DO YOU TALK ON THE PHONE WITH FAMILY, FRIENDS, OR NEIGHBORS?: TWICE A WEEK

## 2021-01-06 SDOH — SOCIAL STABILITY: SOCIAL NETWORK
DO YOU BELONG TO ANY CLUBS OR ORGANIZATIONS SUCH AS CHURCH GROUPS UNIONS, FRATERNAL OR ATHLETIC GROUPS, OR SCHOOL GROUPS?: NO

## 2021-01-06 SDOH — SOCIAL STABILITY: SOCIAL NETWORK: HOW OFTEN DO YOU ATTENT MEETINGS OF THE CLUB OR ORGANIZATION YOU BELONG TO?: NEVER

## 2021-01-06 SDOH — SOCIAL STABILITY: SOCIAL INSECURITY
WITHIN THE LAST YEAR, HAVE YOU BEEN KICKED, HIT, SLAPPED, OR OTHERWISE PHYSICALLY HURT BY YOUR PARTNER OR EX-PARTNER?: NO

## 2021-01-06 SDOH — ECONOMIC STABILITY: FOOD INSECURITY: WITHIN THE PAST 12 MONTHS, YOU WORRIED THAT YOUR FOOD WOULD RUN OUT BEFORE YOU GOT MONEY TO BUY MORE.: NEVER TRUE

## 2021-01-06 ASSESSMENT — ENCOUNTER SYMPTOMS
EYE DISCHARGE: 0
DIARRHEA: 0
ABDOMINAL PAIN: 0
SORE THROAT: 0
COLOR CHANGE: 0
RHINORRHEA: 0
SHORTNESS OF BREATH: 0
WHEEZING: 0
EYE PAIN: 0
BACK PAIN: 0
CONSTIPATION: 0
BLOOD IN STOOL: 0

## 2021-01-06 ASSESSMENT — PATIENT HEALTH QUESTIONNAIRE - PHQ9
SUM OF ALL RESPONSES TO PHQ QUESTIONS 1-9: 0
1. LITTLE INTEREST OR PLEASURE IN DOING THINGS: 0
SUM OF ALL RESPONSES TO PHQ QUESTIONS 1-9: 0

## 2021-01-06 NOTE — PROGRESS NOTES
Leslie Victor     1970    Consultants:  Patient Care Team:  Marika Mo. Wesley Larsen DO as PCP - General (Family Medicine)  Marika Mo. Wesley Larsen DO as PCP - [facility] Empaneled Provider    Chief Complaint   Patient presents with    Annual Exam     annual exam    Anxiety     wants support animal letter today    Migraine     excedrin + imitrex PRN    Colon Cancer Screening     c scope at [address] planned     Routine Pap Outreach     last pap ; needs repeat    Routine Mammography Outreach    Health Maintenance     declines hep c/hiv screen    Immunizations     declines shingrix    Obesity     HPI:  Leslie Victor is a 48 y.o. female  who presents for Established Patient with Personalized Prevention Plan Services.  -Here today for annual exam  -had Be Well Screen 2020; pt will get imported to our records  -pt plans to get c scope, pap smear, and mammogram in     Anxiety:  New dog; pt wants letter stating she can take on plane for emotional support today  Was used to going to job every day. 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 Back to yoga studio/barre class. Used to working at home. Saves gas and doesn't have to get dressed. Going to get puppy. Home alone by himself  Has had dog since she was 12 y/o  Dog  last November  Has place in [place] and wants to take her dog.    -History of migraines, thumb pain, shoulder pain, weight gain/obesity:    Left thumb pain:  Wears brace  Essential oil/[place]/CBD oil seem to help    Shoulder pain has improved from past few years    Obesity:  Wt gain back up  -pt going to try to eat betttSongvice, yoga/barre class  Estimated body mass index is 31.57 kg/m² as calculated from the following:    Height as of this encounter: 5' 8\" (1.727 m). Weight as of this encounter: 207 lb 9.6 oz (94.2 kg).   Wt Readings from Last 3 Encounters:   21 207 lb 9.6 oz (94.2 kg)   20 190 lb (86.2 kg)   02/10/20 198 lb (89.8 kg)       Health Maintenance:  Had flu shot + be well visit  Will do colonoscopy this year and PAP smear and mammogram  Pap smear:  Goes to [facility], last was 2016 will get that  -Declines shingles vaccine today and Hep C/HIV screening    Patient Active Problem List   Diagnosis    Rotator cuff tendinitis    Left shoulder pain    Migraines    Obesity (BMI 30-39. 9)    Low back pain    Rosacea    Chronic thumb pain, left    Scalp cyst    Neoplasm of uncertain behavior of skin     Health Maintenance Completed:  Health Maintenance   Topic Date Due    Hepatitis C screen  1970    HIV screen  10/03/1985    Cervical cancer screen  06/23/2019    Flu vaccine (1) 09/01/2020    Shingles Vaccine (1 of 2) 10/03/2020    Colon cancer screen colonoscopy  10/03/2020    Breast cancer screen  02/12/2022    DTaP/Tdap/Td vaccine (2 - Td) 11/01/2022    Lipid screen  10/31/2023    Hepatitis A vaccine  Aged Out    Hepatitis B vaccine  Aged Out    Hib vaccine  Aged Out    Meningococcal (ACWY) vaccine  Aged Out    Pneumococcal 0-64 years Vaccine  Aged [employer] History   Administered Date(s) Administered    Influenza Vaccine, unspecified formulation 10/18/2010    Influenza Virus Vaccine 10/17/2017, 10/30/2018, 10/22/2019    Influenza, Quadv, IM, (6 mo and older Fluzone, Flulaval, Fluarix and 3 yrs and older Afluria) 10/30/2018    Tdap (Boostrix, Adacel) 11/01/2012       Review of Systems   Constitutional: Positive for unexpected weight change. Negative for chills and fever. HENT: Negative for congestion, rhinorrhea and sore throat. Eyes: Negative for pain, discharge and visual disturbance. Respiratory: Negative for shortness of breath and wheezing. Cardiovascular: Negative for chest pain and leg swelling. Gastrointestinal: Negative for abdominal pain, blood in stool, constipation and diarrhea. Endocrine: Negative for polyuria. Genitourinary: Negative for dysuria and flank pain. Musculoskeletal: Positive for arthralgias. Negative for back pain and neck pain. Skin: Negative for color change, rash and wound. Allergic/Immunologic: Negative for environmental allergies, food allergies and immunocompromised state. Neurological: Positive for headaches. Negative for dizziness, speech difficulty, weakness and light-headedness. Hematological: Negative for adenopathy. Does not bruise/bleed easily. Psychiatric/Behavioral: Negative for dysphoric mood and sleep disturbance. The patient is nervous/anxious. Physical Exam:   Vitals:    01/06/21 1109   BP: 118/76   Pulse: 82   Temp: 97.6 °F (36.4 °C)   TempSrc: Temporal   SpO2: 98%   Weight: 207 lb 9.6 oz (94.2 kg)   Height: 5' 8\" (1.727 m)     Body mass index is 31.57 kg/m². Wt Readings from Last 3 Encounters:   01/06/21 207 lb 9.6 oz (94.2 kg)   05/18/20 190 lb (86.2 kg)   02/10/20 198 lb (89.8 kg)       BP Readings from Last 3 Encounters:   01/06/21 118/76   05/18/20 117/77   02/10/20 111/69       Physical Exam  Constitutional:       General: She is not in acute distress. Appearance: She is well-developed. HENT:      Head: Normocephalic and atraumatic. Right Ear: Tympanic membrane normal.      Left Ear: Tympanic membrane normal.      Nose: Nose normal. No rhinorrhea. Mouth/Throat:      Pharynx: Uvula midline. Eyes:      Pupils: Pupils are equal, round, and reactive to light. Neck:      Trachea: No tracheal deviation. Cardiovascular:      Rate and Rhythm: Normal rate and regular rhythm. Heart sounds: Normal heart sounds. No murmur. No friction rub. No gallop. Pulmonary:      Effort: Pulmonary effort is normal. No respiratory distress. Breath sounds: Normal breath sounds. No wheezing or rales. Abdominal:      General: Bowel sounds are normal. There is no distension. Palpations: Abdomen is soft. Tenderness: There is no abdominal tenderness. There is no rebound. Musculoskeletal: Normal range of motion.          General: No tenderness. Lymphadenopathy:      Cervical: No cervical adenopathy. Skin:     General: Skin is warm and dry. Findings: No erythema or rash. Neurological:      Mental Status: She is alert and oriented to person, place, and time. Cranial Nerves: No cranial nerve deficit. Deep Tendon Reflexes:      Reflex Scores:       Tricep reflexes are 1+ on the right side and 1+ on the left side. Bicep reflexes are 1+ on the right side and 1+ on the left side. Brachioradialis reflexes are 1+ on the right side and 1+ on the left side. Patellar reflexes are 1+ on the right side and 1+ on the left side. Psychiatric:         Mood and Affect: Mood is anxious. Speech: Speech normal.         Thought Content: Thought content does not include homicidal or suicidal ideation.         Lab Review: will review Be Well Results when resulted  Lab Results   Component Value Date     10/31/2018    K 4.2 10/31/2018     10/31/2018    CO2 25 10/31/2018    BUN 14 10/31/2018    CREATININE 0.6 10/31/2018    GLUCOSE 91 10/31/2018    CALCIUM 10.0 10/31/2018    PROT 7.5 10/31/2018    LABALBU 4.5 10/31/2018    BILITOT 0.4 10/31/2018    ALKPHOS 93 10/31/2018    AST 24 10/31/2018    ALT 28 10/31/2018    LABGLOM >60 10/31/2018    GFRAA >60 10/31/2018    AGRATIO 1.5 10/31/2018    GLOB 3.0 10/31/2018       Lab Results   Component Value Date    LABA1C 5.4 10/31/2018     Lab Results   Component Value Date    .3 10/31/2018     Lab Results   Component Value Date    WBC 7.8 09/29/2016    HGB 15.1 09/29/2016    HCT 44.9 09/29/2016    MCV 88.0 09/29/2016     09/29/2016       Assessment/Plan:  Beatrice Healy is a 47 y/o femal who was seen today for annual exam, anxiety, migraine, colon cancer screening, routine pap outreach, routine mammography outreach, health maintenance and immunizations review, obesity/weight gain:    Diagnoses and all orders for this visit:    Annual physical exam  -Chart/records reviewed, history and physical performed, health maintenance addressed and updated, presenting problems addressed. Anxiety  Letter written for emotional support animal, see media tab    Migraine with aura and without status migrainosus, not intractable  -     SUMAtriptan (IMITREX) 100 MG tablet; TAKE ONE-HALF TO ONE TABLET BY MOUTH EVERY 2 HOURS AS NEEDED MAX 2 TABLETS IN 24 HOURS    Colon cancer screening  -     COLONOSCOPY (Screening)    Obesity  Body mass index is 31.57 kg/m². Wt Readings from Last 3 Encounters:   01/06/21 207 lb 9.6 oz (94.2 kg)   05/18/20 190 lb (86.2 kg)   02/10/20 198 lb (89.8 kg)       -Recommend 150 minutes of cardiovascular activity a week, or 10,000 to 15,000 steps a day, 2 days of weightbearing  -dietary wise, try to stick to your weight x 10 in calories a day  -avoid processed/refined carbohydrates (boxed/canned/frozen/fast)  -encourage healthy protein and fat, butter, avocado, egg      Health Maintenance Due:  Health Maintenance Due   Topic Date Due    Hepatitis C screen  1970    HIV screen  10/03/1985    Cervical cancer screen  06/23/2019    Flu vaccine (1) 09/01/2020    Shingles Vaccine (1 of 2) 10/03/2020    Colon cancer screen colonoscopy  10/03/2020      Health Maintenance:  (F) Breast Cancer Screen: 2/2020 will bety repeat in 2021  (F) Cervical Cancer Screen: 2016 will go [facility] repeat at [facility]  CRC/Colonoscopy Screening: will get colonoscopy through [facility]  Had flu shot + be well visit  -Declines shingles vaccine today and Hep C/HIV screening    Return in about 1 year (around 1/6/2022). EMR Dragon/transcription disclaimer:  Much of this encounter note is electronic transcription/translation of spoken language to printed texts. The electronic translation of spoken language may be erroneous, or at times, nonsensical words or phrases may be inadvertently transcribed.   Although I have reviewed the note for such errors, some may still yes

## 2021-01-06 NOTE — PATIENT INSTRUCTIONS
-get pap smear, mammogram, colonoscopy in new year    -Recommend 150 minutes of cardiovascular activity a week, or 10,000 to 15,000 steps a day, 2 days of weightbearing  -dietary wise, try to stick to your weight x 10 in calories a day  -avoid processed/refined carbohydrates (boxed/canned/frozen/fast)  -encourage healthy protein and fat, butter, avocado, egg    Patient Education        Well Visit, Ages 25 to 48: Care Instructions  Your Care Instructions     Physical exams can help you stay healthy. Your doctor has checked your overall health and may have suggested ways to take good care of yourself. He or she also may have recommended tests. At home, you can help prevent illness with healthy eating, regular exercise, and other steps. Follow-up care is a key part of your treatment and safety. Be sure to make and go to all appointments, and call your doctor if you are having problems. It's also a good idea to know your test results and keep a list of the medicines you take. How can you care for yourself at home? · Reach and stay at a healthy weight. This will lower your risk for many problems, such as obesity, diabetes, heart disease, and high blood pressure. · Get at least 30 minutes of physical activity on most days of the week. Walking is a good choice. You also may want to do other activities, such as running, swimming, cycling, or playing tennis or team sports. Discuss any changes in your exercise program with your doctor. · Do not smoke or allow others to smoke around you. If you need help quitting, talk to your doctor about stop-smoking programs and medicines. These can increase your chances of quitting for good. · Talk to your doctor about whether you have any risk factors for sexually transmitted infections (STIs). Having one sex partner (who does not have STIs and does not have sex with anyone else) is a good way to avoid these infections.   · Use birth control if you do not want to have children at Pap test, an HPV test, or both tests at the same time (called co-testing). Talk with your doctor about how often to have testing. · Tests for sexually transmitted infections (STIs). Ask whether you should have tests for STIs. You may be at risk if you have sex with more than one person, especially if your partners do not wear condoms. For men  · Tests for sexually transmitted infections (STIs). Ask whether you should have tests for STIs. You may be at risk if you have sex with more than one person, especially if you do not wear a condom. · Testicular cancer exam. Ask your doctor whether you should check your testicles regularly. · Prostate exam. Talk to your doctor about whether you should have a blood test (called a PSA test) for prostate cancer. Experts differ on whether and when men should have this test. Some experts suggest it if you are older than 39 and are -American or have a father or brother who got prostate cancer when he was younger than 72. When should you call for help? Watch closely for changes in your health, and be sure to contact your doctor if you have any problems or symptoms that concern you. Where can you learn more? Go to [email or web address] and sign in to your [employer] account. Enter P072 in the [place] box to learn more about \"Well Visit, Ages 25 to 48: Care Instructions. \"     If you do not have an account, please click on the \"Sign Up Now\" link. Current as of: May 27, 2020               Content Version: 12.6  © 2006-2020 [employer]. Care instructions adapted under license by [facility] [place] ([facility]). If you have questions about a medical condition or this instruction, always ask your healthcare professional. [place] [address] any warranty or liability for your use of this information.

## 2021-08-10 ENCOUNTER — PATIENT MESSAGE (OUTPATIENT)
Dept: PRIMARY CARE CLINIC | Age: 51
End: 2021-08-10

## 2021-08-10 DIAGNOSIS — M27.2 ODONTOGENIC INFECTION OF JAW: Primary | ICD-10-CM

## 2021-08-10 RX ORDER — CLINDAMYCIN HYDROCHLORIDE 150 MG/1
450 CAPSULE ORAL 3 TIMES DAILY
Qty: 63 CAPSULE | Refills: 0 | Status: SHIPPED | OUTPATIENT
Start: 2021-08-10 | End: 2021-08-17

## 2021-08-10 RX ORDER — CLINDAMYCIN HYDROCHLORIDE 150 MG/1
450 CAPSULE ORAL 3 TIMES DAILY
Qty: 63 CAPSULE | Refills: 0 | Status: CANCELLED | OUTPATIENT
Start: 2021-08-10 | End: 2021-08-17

## 2021-08-10 NOTE — TELEPHONE ENCOUNTER
Script sent. 1. Odontogenic infection of jaw    - clindamycin (CLEOCIN) 150 MG capsule; Take 3 capsules by mouth 3 times daily for 7 days  Dispense: 63 capsule;  Refill: 0

## 2021-08-10 NOTE — TELEPHONE ENCOUNTER
From: Geneva Jeffers  To: Carrol Kiser.  Cecily Tavarez., DO  Sent: 8/10/2021 12:20 PM EDT  Subject: Prescription Question    I have an abscess on my lower gum (not the tooth ) and I wondered if you would be able to call in an antibiotic as I am leaving out of town first thing and will not be able to be seen , I don't want to be stuck in [place] with this if you are willing

## 2021-10-05 ENCOUNTER — VIRTUAL VISIT (OUTPATIENT)
Dept: PRIMARY CARE CLINIC | Age: 51
End: 2021-10-05
Payer: COMMERCIAL

## 2021-10-05 DIAGNOSIS — L30.9 DERMATITIS: Primary | ICD-10-CM

## 2021-10-05 PROCEDURE — 99213 OFFICE O/P EST LOW 20 MIN: CPT | Performed by: FAMILY MEDICINE

## 2021-10-05 NOTE — PROGRESS NOTES
IN 24 HOURS 9 tablet 5    aspirin-acetaminophen-caffeine (EXCEDRIN MIGRAINE) [phone] MG per tablet Take 1 tablet by mouth every 6 hours as needed for Headaches         Physical Exam:    Constitutional:   · Reviewed vitals above  · Well Nourished, well developed, no distress       Skin:  · Patient has a oval-shaped area of erythema just proximal to right wrist, with excoriations and small scabs throughout. No blisters, pustules. Psych:  · Normal mood and affect  · Normal insight and judgement                  Sloan Jaquez  is a 46 y.o.  female being evaluated by a Virtual Visit (video visit) encounter to address concerns as mentioned above. A caregiver was present when appropriate. Due to this being a TeleHealth encounter (During TQWL-98 public health emergency), evaluation of the following organ systems was limited: Vitals/Constitutional/EENT/Resp/CV/GI//MS/Neuro/Skin/Heme-Lymph-Imm. Pursuant to the emergency declaration under the [address] [place] [address] and the [employer] and [employer] Act, this Virtual Visit was conducted with patient's (and/or legal guardian's) consent, to reduce the patient's risk of exposure to COVID-19 and provide necessary medical care. The patient (and/or legal guardian) has also been advised to contact this office for worsening conditions or problems, and seek emergency medical treatment and/or call 911 if deemed necessary. Patient identification was verified at the start of the visit: Yes    Services were provided through a video synchronous discussion virtually to substitute for in-person clinic visit. Patient was located at home and provider was located in office or at home. EMR Dragon/transcription disclaimer:  Much of this encounter note is electronic transcription/translation of spoken language to printed texts.   The electronic translation of spoken language may be erroneous, or at times, nonsensical words or phrases may be inadvertently transcribed. Although I have reviewed the note for such errors, some may still exist.       An electronic signature was used to authenticate this note.     --Karissa Fatima MD

## 2021-10-19 ENCOUNTER — NURSE TRIAGE (OUTPATIENT)
Dept: OTHER | Facility: CLINIC | Age: 51
End: 2021-10-19

## 2021-10-19 NOTE — TELEPHONE ENCOUNTER
Received call from [facility] at [facility] with Red Flag Complaint. Brief description of triage:bilateral ankle swelling    Triage indicates for patient to be seen in next 3 days    Care advice provided, patient verbalizes understanding; denies any other questions or concerns; instructed to call back for any new or worsening symptoms. Writer provided warm transfer to [facility] [place] at [facility] for appointment scheduling. Attention Provider: Thank you for allowing me to participate in the care of your patient. The patient was connected to triage in response to information provided to the ECC/PSC. Please do not respond through this encounter as the response is not directed to a shared pool. Reason for Disposition   MILD swelling of both ankles (i.e., pedal edema) AND new onset or worsening    Answer Assessment - Initial Assessment Questions  1. ONSET: \"When did the swelling start? \" (e.g., minutes, hours, days)     10/19/21    2. LOCATION: \"What part of the leg is swollen? \"  \"Are both legs swollen or just one leg? \"      Both ankles and feet    3. SEVERITY: \"How bad is the swelling? \" (e.g., localized; mild, moderate, severe)   - Localized - small area of swelling localized to one leg   - MILD pedal edema - swelling limited to foot and ankle, pitting edema < 1/4 inch (6 mm) deep, rest and elevation eliminate most or all swelling   - MODERATE edema - swelling of lower leg to knee, pitting edema > 1/4 inch (6 mm) deep, rest and elevation only partially reduce swelling   - SEVERE edema - swelling extends above knee, facial or hand swelling present     Mild    4. REDNESS: \"Does the swelling look red or infected? \"      No    5. PAIN: \"Is the swelling painful to touch? \" If so, ask: \"How painful is it? \"   (Scale 1-10; mild, moderate or severe)      No    6. FEVER: \"Do you have a fever? \" If so, ask: \"What is it, how was it measured, and when did it start? \"       No    7. CAUSE: \"What do you think is causing the leg swelling? \"      *No Answer*  8. MEDICAL HISTORY: \"Do you have a history of heart failure, kidney disease, liver failure, or cancer? \"      *No Answer*  9. RECURRENT SYMPTOM: \"Have you had leg swelling before? \" If so, ask: \"When was the last time? \" \"What happened that time? \"    Yes, states only when flying 8/21, swelling went away next day and didn't come back    10. OTHER SYMPTOMS: \"Do you have any other symptoms? \" (e.g., chest pain, difficulty breathing)    Rash right forearm seen for this virtual visit    11. PREGNANCY: \"Is there any chance you are pregnant? \" \"When was your last menstrual period? \"        no    Protocols used: LEG SWELLING AND EDEMA-ADULT-OH

## 2021-10-19 NOTE — TELEPHONE ENCOUNTER
This RN reiterated the dispo to be seen within 3 days . Pt pleasantly refuses despite this RN explaining that it is best practice and it should occur to avoid risk to her safety. Pt refuses option of UC as well .

## 2021-12-19 DIAGNOSIS — G43.109 MIGRAINE WITH AURA AND WITHOUT STATUS MIGRAINOSUS, NOT INTRACTABLE: ICD-10-CM

## 2021-12-20 RX ORDER — SUMATRIPTAN 100 MG/1
TABLET, FILM COATED ORAL
Qty: 9 TABLET | Refills: 5 | Status: SHIPPED | OUTPATIENT
Start: 2021-12-20 | End: 2022-08-12

## 2022-04-18 ENCOUNTER — OFFICE VISIT (OUTPATIENT)
Dept: PRIMARY CARE CLINIC | Age: 52
End: 2022-04-18
Payer: COMMERCIAL

## 2022-04-18 ENCOUNTER — HOSPITAL ENCOUNTER (OUTPATIENT)
Age: 52
Discharge: HOME | End: 2022-04-18
Payer: COMMERCIAL

## 2022-04-18 ENCOUNTER — PATIENT MESSAGE (OUTPATIENT)
Dept: PRIMARY CARE CLINIC | Age: 52
End: 2022-04-18

## 2022-04-18 VITALS
TEMPERATURE: 97.3 F | HEIGHT: 68 IN | BODY MASS INDEX: 32.22 KG/M2 | OXYGEN SATURATION: 98 % | HEART RATE: 84 BPM | DIASTOLIC BLOOD PRESSURE: 82 MMHG | SYSTOLIC BLOOD PRESSURE: 118 MMHG | WEIGHT: 212.6 LBS

## 2022-04-18 DIAGNOSIS — Z12.11 SCREEN FOR COLON CANCER: ICD-10-CM

## 2022-04-18 DIAGNOSIS — E66.9 OBESITY (BMI 30-39.9): ICD-10-CM

## 2022-04-18 DIAGNOSIS — Z12.31 BREAST CANCER SCREENING BY MAMMOGRAM: ICD-10-CM

## 2022-04-18 DIAGNOSIS — E66.9 OBESITY (BMI 30-39.9): Primary | ICD-10-CM

## 2022-04-18 DIAGNOSIS — Z00.00 HEALTHCARE MAINTENANCE: ICD-10-CM

## 2022-04-18 DIAGNOSIS — G43.909 MIGRAINE WITHOUT STATUS MIGRAINOSUS, NOT INTRACTABLE, UNSPECIFIED MIGRAINE TYPE: ICD-10-CM

## 2022-04-18 DIAGNOSIS — E78.5 HYPERLIPIDEMIA, UNSPECIFIED HYPERLIPIDEMIA TYPE: Primary | ICD-10-CM

## 2022-04-18 DIAGNOSIS — E78.5 HYPERLIPIDEMIA, UNSPECIFIED HYPERLIPIDEMIA TYPE: ICD-10-CM

## 2022-04-18 LAB
CHOLESTEROL, TOTAL: 277 MG/DL (ref 0–199)
HDLC SERPL-MCNC: 51 MG/DL (ref 40–60)
HEPATITIS C ANTIBODY INTERPRETATION: NORMAL
LDL CHOLESTEROL CALCULATED: 191 MG/DL
TRIGL SERPL-MCNC: 177 MG/DL (ref 0–150)
VLDLC SERPL CALC-MCNC: 35 MG/DL

## 2022-04-18 PROCEDURE — 86702 HIV-2 ANTIBODY: CPT

## 2022-04-18 PROCEDURE — 87390 HIV-1 AG IA: CPT

## 2022-04-18 PROCEDURE — 83036 HEMOGLOBIN GLYCOSYLATED A1C: CPT

## 2022-04-18 PROCEDURE — 36415 COLL VENOUS BLD VENIPUNCTURE: CPT

## 2022-04-18 PROCEDURE — 86803 HEPATITIS C AB TEST: CPT

## 2022-04-18 PROCEDURE — 86701 HIV-1ANTIBODY: CPT

## 2022-04-18 PROCEDURE — 99214 OFFICE O/P EST MOD 30 MIN: CPT

## 2022-04-18 PROCEDURE — 80061 LIPID PANEL: CPT

## 2022-04-18 SDOH — ECONOMIC STABILITY: FOOD INSECURITY: WITHIN THE PAST 12 MONTHS, THE FOOD YOU BOUGHT JUST DIDN'T LAST AND YOU DIDN'T HAVE MONEY TO GET MORE.: NEVER TRUE

## 2022-04-18 SDOH — ECONOMIC STABILITY: FOOD INSECURITY: WITHIN THE PAST 12 MONTHS, YOU WORRIED THAT YOUR FOOD WOULD RUN OUT BEFORE YOU GOT MONEY TO BUY MORE.: NEVER TRUE

## 2022-04-18 ASSESSMENT — ENCOUNTER SYMPTOMS
COUGH: 0
SHORTNESS OF BREATH: 0
VOMITING: 0
NAUSEA: 0
ABDOMINAL PAIN: 0
DIARRHEA: 0

## 2022-04-18 ASSESSMENT — ANXIETY QUESTIONNAIRES
5. BEING SO RESTLESS THAT IT IS HARD TO SIT STILL: 0
7. FEELING AFRAID AS IF SOMETHING AWFUL MIGHT HAPPEN: 0
3. WORRYING TOO MUCH ABOUT DIFFERENT THINGS: 0
1. FEELING NERVOUS, ANXIOUS, OR ON EDGE: 0
GAD7 TOTAL SCORE: 0
4. TROUBLE RELAXING: 0
2. NOT BEING ABLE TO STOP OR CONTROL WORRYING: 0
IF YOU CHECKED OFF ANY PROBLEMS ON THIS QUESTIONNAIRE, HOW DIFFICULT HAVE THESE PROBLEMS MADE IT FOR YOU TO DO YOUR WORK, TAKE CARE OF THINGS AT HOME, OR GET ALONG WITH OTHER PEOPLE: NOT DIFFICULT AT ALL
6. BECOMING EASILY ANNOYED OR IRRITABLE: 0

## 2022-04-18 ASSESSMENT — SOCIAL DETERMINANTS OF HEALTH (SDOH): HOW HARD IS IT FOR YOU TO PAY FOR THE VERY BASICS LIKE FOOD, HOUSING, MEDICAL CARE, AND HEATING?: NOT HARD AT ALL

## 2022-04-18 ASSESSMENT — PATIENT HEALTH QUESTIONNAIRE - PHQ9
9. THOUGHTS THAT YOU WOULD BE BETTER OFF DEAD, OR OF HURTING YOURSELF: 0
SUM OF ALL RESPONSES TO PHQ9 QUESTIONS 1 & 2: 0
3. TROUBLE FALLING OR STAYING ASLEEP: 1
SUM OF ALL RESPONSES TO PHQ QUESTIONS 1-9: 3
4. FEELING TIRED OR HAVING LITTLE ENERGY: 1
6. FEELING BAD ABOUT YOURSELF - OR THAT YOU ARE A FAILURE OR HAVE LET YOURSELF OR YOUR FAMILY DOWN: 0
SUM OF ALL RESPONSES TO PHQ QUESTIONS 1-9: 3
5. POOR APPETITE OR OVEREATING: 1
8. MOVING OR SPEAKING SO SLOWLY THAT OTHER PEOPLE COULD HAVE NOTICED. OR THE OPPOSITE, BEING SO FIGETY OR RESTLESS THAT YOU HAVE BEEN MOVING AROUND A LOT MORE THAN USUAL: 0
10. IF YOU CHECKED OFF ANY PROBLEMS, HOW DIFFICULT HAVE THESE PROBLEMS MADE IT FOR YOU TO DO YOUR WORK, TAKE CARE OF THINGS AT HOME, OR GET ALONG WITH OTHER PEOPLE: 0
SUM OF ALL RESPONSES TO PHQ QUESTIONS 1-9: 3
1. LITTLE INTEREST OR PLEASURE IN DOING THINGS: 0
SUM OF ALL RESPONSES TO PHQ QUESTIONS 1-9: 3
2. FEELING DOWN, DEPRESSED OR HOPELESS: 0
7. TROUBLE CONCENTRATING ON THINGS, SUCH AS READING THE NEWSPAPER OR WATCHING TELEVISION: 0

## 2022-04-18 NOTE — TELEPHONE ENCOUNTER
Patient is calling needing her Semaglutide-Weight Management (WEGOVY) 0.25 MG/0.5ML SOAJ SC injection sent to [facility], [address] [phone] Ludivina Alexander [phone]  Mail order will not fill this

## 2022-04-18 NOTE — TELEPHONE ENCOUNTER
Refill Request     Last Seen: Last Seen Department: 4/18/2022  Last Seen by PCP: 1/6/2021    Last Written: already done    Next Appointment:   No future appointments.           Requested Prescriptions      No prescriptions requested or ordered in this encounter

## 2022-04-18 NOTE — PROGRESS NOTES
[facility]  [address] [facility] [facility] [address], [address], [address] [facility] [address]  Phone: [phone]      Name:  Luane Mcburney  :    1970    Consultants:   Patient Care Team:  Aydee Montanez. Susan Forte DO as PCP - General (Family Medicine)  Aydee Montanez. Susan Forte DO as PCP - [facility] Empaneled Provider    Chief Complaint:     Luane Mcburney is a 46 y.o. female who presents today for an established patient care visit with Personalized Prevention Plan Services as noted below. HPI:     Luane Mcburney is a 46 y.o. female with a Hx migraines and obesity (BMI >30) presents today for concerns of difficulty with weight loss. Hyperlipidemia, unspecified hyperlipidemia type  Obesity (BMI 30-39.9)  - 10/31/2018 , Triglycerides 163, LDLc 163  - ASCVD risk 1.6%, but BMI >30, obese category  - Diet = follows macro diet which involves careful calculation of protein, fats, carbs percentage in diet. Averages ~1600 calories a day. Previously tried keto diet. - Hydration = Drinks mainly water and 1 iced coffee daily. No sodas. - Exercise = works out or does yoga 3 days/wk for 40 min each  - Despite lifestyle modifications, patient is unable to decrease weight. States that she would like to get back to 170 lbs. - Stated several friends who tried Ozempic (Semaglutide) injections with successful weight loss efforts and was wondering if Ozempic was appropriate for her to try. Migraine without status migrainosus, not intractable, unspecified migraine type  - Since cutting out processed foods, carbonated drinks, pt has rarely had migraines. - Has not had a bad migraine in 2 years since improvement of diet. - Still has Sumatriptan and Excedrin Migraine as needed for migraine abortive medicines.      Healthcare maintenance  - Pap smear = established pt of [facility], but has not gotten a Pap smear and knows she is due.   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 Breast cancer screening by mammogram  - Last mammogram 2 yrs ago. Patient is due for next screening mammogram.    Screen for colon cancer  - Colonoscopy ordered last time, but patient has not not yet completed the colonoscopy      Patient Active Problem List   Diagnosis    Rotator cuff tendinitis    Left shoulder pain    Migraines    Obesity (BMI 30-39. 9)    Low back pain    Rosacea    Chronic thumb pain, left    Scalp cyst    Neoplasm of uncertain behavior of skin       Past Medical History:    Past Medical History:   Diagnosis Date    Cornea abrasion 2020    cornea tear     Migraines     uses imitrex as needed       Past Surgical History:  Past Surgical History:   Procedure Laterality Date    ABDOMEN SURGERY      APPENDECTOMY  26    ARM SURGERY N/A 5/18/2020    EXCISION SCALP CYST x TWO, EXCISION SKIN LESION LEFT LEG performed by Tootie Spann MD at [address] [place] [address]  12-5-2012    AMBER BREAST REDUCTION; ABDOMINOPLASTY    CYST REMOVAL      EXCISION SCALP CYST x TWO, EXCISION SKIN LESION LEFT LEG     ENDOMETRIAL ABLATION  2007    MOUTH SURGERY  1988       Home Meds:  Prior to Visit Medications    Medication Sig Taking? Authorizing Provider   Semaglutide-Weight Management (WEGOVY) 0.25 MG/0.5ML SOAJ SC injection Inject 0.25 mg into the skin every 7 days Yes Anna Sexton, DO   SUMAtriptan (IMITREX) 100 MG tablet TAKE 1/2 TO 1 TABLET BY MOUTH AT ONSET OF HEADACHE. MAY REPEAT DOSE IN 2 HOURS IF NO RELIEF. DO NOT EXCEED 2 TABLETS IN 24 HOURS Yes Tasia Oropeza.  Mary Brothers., DO   aspirin-acetaminophen-caffeine (EXCEDRIN MIGRAINE) 013358-37 MG per tablet Take 1 tablet by mouth every 6 hours as needed for Headaches Yes Historical Provider, MD       Allergies:    Oxycodone-acetaminophen and Oxycodone-acetaminophen    Family History:       Problem Relation Age of Onset  Heart Disease Mother         4 stents, aortic valve replacement     Dementia Mother     Heart Disease Father     Cancer Maternal Aunt     Cancer Paternal Aunt     Cancer Brother         esophageal and stomach CA x 6 years, multiple myeloma       Social History:  Social History     Tobacco History     Smoking Status  Never Smoker    Smokeless Tobacco Use  Never Used          Alcohol History     Alcohol Use Status  Yes Drinks/Week  0 Standard drinks or equivalent per week Amount  0.0 standard drinks of alcohol/wk Comment  socially occasionally          Drug Use     Drug Use Status  No          Sexual Activity     Sexually Active  Yes Partners  Male                   Health Maintenance Completed:  Health Maintenance   Topic Date Due    HIV screen  Never done    Cervical cancer screen  Never done    Colorectal Cancer Screen  Never done    Shingles Vaccine (1 of 2) Never done    Diabetes screen  10/31/2021    Breast cancer screen  02/12/2022    Flu vaccine (Season Ended) 09/01/2022    DTaP/Tdap/Td vaccine (2 - Td or Tdap) 11/01/2022    Depression Screen  04/18/2023    Lipid screen  04/18/2027    COVID-19 Vaccine  Completed    Hepatitis C screen  Completed    Hepatitis A vaccine  Aged Out    Hepatitis B vaccine  Aged Out    Hib vaccine  Aged Out    Meningococcal (ACWY) vaccine  Aged Out    Pneumococcal 0-64 years Vaccine  Aged [employer] History   Administered Date(s) Administered    COVID-19, Moderna, Primary or Immunocompromised, PF, 100mcg/0.5mL 04/19/2021, 05/18/2021, 12/29/2021    Influenza Vaccine, unspecified formulation 10/18/2010    Influenza Virus Vaccine 10/17/2017, 10/30/2018, 10/22/2019    Influenza, Quadv, IM, (6 mo and older Fluzone, Flulaval, Fluarix and 3 yrs and older Afluria) 10/30/2018    Tdap (Boostrix, Adacel) 11/01/2012         Review of Systems:  Review of Systems   Constitutional: Negative for chills, fatigue and fever.    Respiratory: Negative for cough and shortness of breath. Cardiovascular: Negative for chest pain and palpitations. Gastrointestinal: Negative for abdominal pain, diarrhea, nausea and vomiting. Genitourinary: Negative for dysuria. Neurological: Negative for light-headedness and headaches. Physical Exam:   Vitals:    04/18/22 0810   BP: 118/82   Pulse: 84   Temp: 97.3 °F (36.3 °C)   TempSrc: Temporal   SpO2: 98%   Weight: 212 lb 9.6 oz (96.4 kg)   Height: 5' 8\" (1.727 m)     Body mass index is 32.33 kg/m². Wt Readings from Last 3 Encounters:   04/18/22 212 lb 9.6 oz (96.4 kg)   01/06/21 207 lb 9.6 oz (94.2 kg)   05/18/20 190 lb (86.2 kg)       BP Readings from Last 3 Encounters:   04/18/22 118/82   01/06/21 118/76   05/18/20 117/77       Physical Exam  Constitutional:       Appearance: Normal appearance. Cardiovascular:      Rate and Rhythm: Normal rate and regular rhythm. Pulses: Normal pulses. Heart sounds: Normal heart sounds. Pulmonary:      Effort: Pulmonary effort is normal.      Breath sounds: Normal breath sounds. Skin:     General: Skin is warm and dry. Neurological:      General: No focal deficit present. Mental Status: She is alert and oriented to person, place, and time. Psychiatric:         Mood and Affect: Mood normal.         Thought Content: Thought content normal.            Lab Review:   No visits with results within 2 Month(s) from this visit. Latest known visit with results is:   Office Visit on 05/14/2020   Component Date Value    SARS-CoV-2, PCR 05/14/2020 Not Detected           Assessment/Plan:  Delbert Cobb is a 46 y.o. female with a Hx migraines and obesity (BMI >30) presents today for concerns of difficulty with weight loss. Katie Michaels was seen today for weight loss.     Diagnoses and all orders for this visit:    Hyperlipidemia, unspecified hyperlipidemia type  Obesity (BMI 30-39.9)  - 10/31/2018 , Triglycerides 163, LDLc 163  - ASCVD risk 1.6%, but BMI >30, obese category. No current indications for statin therapy. - Follows macro diet of average 1600 rafa a day, drinks mainly water and one iced coffee daily, avoids carbonated/sugary drinks, exercises 3 days a week for 40 minutes each. - Despite lifestyle modifications, unable to decrease weight.   - Ordered a1c and lipid panel to evaluate diabetes/hyperlipidemia status  - Start Semaglutide 0.25 mg injections every 7 days for 4 weeks. Can up dosage as needed for weight loss management.   - Recommend 150 minutes of cardiovascular activity a week, or 10,000 to 15,000 steps a day, 2 days of weightbearing  - Dietary wise, try to stick to your weight (lb) x 10 in calories a day. Avoid processed/refined carbohydrates (boxed/canned/frozen/fast). Encourage healthy protein and fat, butter, avocado, egg  -     Lipid Panel; Future  -     Hemoglobin A1C; Future  -     Semaglutide-Weight Management (WEGOVY) 0.25 MG/0.5ML SOAJ SC injection; Inject 0.25 mg into the skin every 7 days    Migraine without status migrainosus, not intractable, unspecified migraine type  - Since cutting out processed foods, carbonated drinks, pt has rarely had migraines   - Sumatriptan (Imitrex)  mg (0.5-1 tablet) and Excedrin Migraine 250-250-65 mg q6hrs PRN as needed  - Pt voices understanding of avoidance of triggers, lifestyle modification (increase hydration, sleep hygiene)    Healthcare maintenance  - Pap smear = established pt of [address] [place] [address] [employer] [facility], pt to schedule with OBGYN  - Shingles = pt is traveling to [facility] [place] today for workshop, will defer the shingrex vacc until next visit. - Hep C and HIV testing ordered today  -     HIV Screen; Future  -     Hepatitis C Antibody; Future    Breast cancer screening by mammogram  - Last mammogram 2 yrs ago. Order placed today  -     PAU DIGITAL SCREEN W OR WO CAD BILATERAL; Future    Screen for colon cancer  - Colonoscopy ordered last time, but not yet completed. Order placed today.   -     Dina Aparicio, Pollo Taylor MD, Gastroenterology, [facility]      Health Maintenance Due:  Health Maintenance Due   Topic Date Due    HIV screen  Never done    Cervical cancer screen  Never done    Colorectal Cancer Screen  Never done    Shingles Vaccine (1 of 2) Never done    Diabetes screen  10/31/2021    Breast cancer screen  02/12/2022        Health care decision maker:  <72years old      Health Maintenance: (USPSTF Recommendations)  (F) Breast Cancer Screen: (40-49 (C), 50-74 biennial screening mammogram (B)): mammogram last completed in 2020, ordered today  (F) Cervical Cancer Screen: (21-29 q3yr cytology alone; 30-65 q3yr cytology alone, q5yr with hrHPV alone, or q5yr cytology+hrHPV (A)): Pt to schedule pap smear with OBGYN [address]  CRC/Colonoscopy Screening: (adults 39-53 (B), 50-75 (A)): colonoscopy ordered today  HIV Screen: (15-65 yr old, and all pregnant patients (A)): ordered today  Hep C Screen: (18-79 yr old (B)): ordered today  Immunizations: Talisha Bandy booster done 12/29/2021. Tdap on 11/1/2012. Shingles - pt defer to next visit or will schedule at outside pharmacy. RTC:  Return in about 3 months (around 7/18/2022) for Chronic Care Follow-Up. EMR Dragon/transcription disclaimer:  Much of this encounter note is electronic transcription/translation of spoken language to printed texts. The electronic translation of spoken language may be erroneous, or at times, nonsensical words or phrases may be inadvertently transcribed.   Although I have reviewed the note for such errors, some may still exist.     Jose Raul Falcon, DO  4/18/2022

## 2022-04-18 NOTE — PATIENT INSTRUCTIONS
Call central scheduling for mammogram:   [phone]    Colonoscopy:  MD Aparna  [address] [place] [address] [address], [address] [address]  Phone: [phone]  Fax: [phone]

## 2022-04-19 LAB
ESTIMATED AVERAGE GLUCOSE: 105.4 MG/DL
HBA1C MFR BLD: 5.3 %
HIV AG/AB: NORMAL
HIV ANTIGEN: NORMAL
HIV-1 ANTIBODY: NORMAL
HIV-2 AB: NORMAL

## 2022-04-19 NOTE — TELEPHONE ENCOUNTER
From: Piter Osman  To:  Blake Cortez DO  Sent: 4/18/2022 8:20 PM EDT  Subject: Ozempic prescription     [facility] called and they cant fill this by mail order so it has to be send to the pharmacy   [facility]   Thank you

## 2022-05-25 RX ORDER — SEMAGLUTIDE 1.34 MG/ML
INJECTION, SOLUTION SUBCUTANEOUS
Qty: 1 PEN | Refills: 1 | Status: SHIPPED | OUTPATIENT
Start: 2022-05-25 | End: 2022-07-05 | Stop reason: SDUPTHER

## 2022-05-26 ENCOUNTER — TELEPHONE (OUTPATIENT)
Dept: PRIMARY CARE CLINIC | Age: 52
End: 2022-05-26

## 2022-05-26 NOTE — TELEPHONE ENCOUNTER
Received notification from [facility] stating Theora [facility] needs requires  PA    [facility]       (scan PA notification (fax) in Epic an attach to this message)     PLEASE NOTE: We received a denial for [facility] recently

## 2022-05-31 ENCOUNTER — TELEPHONE (OUTPATIENT)
Dept: PRIMARY CARE CLINIC | Age: 52
End: 2022-05-31

## 2022-05-31 NOTE — TELEPHONE ENCOUNTER
Ozempic PA denied    Please send alternative    Metformin  Glipizide  Glimepiride  Glyburide  Pioglitazone  arash Cosby

## 2022-06-01 NOTE — TELEPHONE ENCOUNTER
Patient reached out to let us know pharmacy had run medication wrong and Ozempic is covered. She will fill prescription as planned.

## 2022-06-20 ENCOUNTER — TELEPHONE (OUTPATIENT)
Dept: OTHER | Facility: CLINIC | Age: 52
End: 2022-06-20

## 2022-06-20 NOTE — TELEPHONE ENCOUNTER
Courtney Snow, Was contacted today as part of [facility] to schedule a mammogram.         After speaking with patient has not had a recent mammogram.  Reminded patient that they have an open order from  Estela Carrion. Shana Cid, DO and need to contact Central Scheduling to schedule a mammogram.    Spoke w/ pt, declined to schedule. Pt states she has number to schedule and will call back at her convenience.      Shirley Eduardo LPN

## 2022-07-05 DIAGNOSIS — E66.9 OBESITY (BMI 30-39.9): ICD-10-CM

## 2022-07-05 RX ORDER — SEMAGLUTIDE 1.34 MG/ML
0.5 INJECTION, SOLUTION SUBCUTANEOUS WEEKLY
Qty: 3 PEN | Refills: 3 | Status: SHIPPED | OUTPATIENT
Start: 2022-07-05 | End: 2022-08-09

## 2022-07-05 NOTE — TELEPHONE ENCOUNTER
Pharmacy calling to request refill for the Pt.     Semaglutide,0.25 or 0.5MG/DOS, (OZEMPIC, 0.25 OR 0.5 MG/DOSE,) 2 MG/1.5ML SOPN    Pharmacy:  [address] [place] [address], [phone] [facility]

## 2022-07-05 NOTE — TELEPHONE ENCOUNTER
Script sent. 3 months supply with 3 refills    0.5 mg SC once weekly      1.  Obesity (BMI 30-39.9)  - Semaglutide,0.25 or 0.5MG/DOS, (OZEMPIC, 0.25 OR 0.5 MG/DOSE,) 2 MG/1.5ML SOPN; Inject 0.5 mg into the skin once a week  Dispense: 3 pen; Refill: 3

## 2022-07-05 NOTE — TELEPHONE ENCOUNTER
Refill Request     Last Seen: Last Seen Department: 4/18/2022  Last Seen by PCP: 1/6/2021    Last Written: 5/25, #1, 1 refill    Next Appointment:   No future appointments. Requested Prescriptions     Pending Prescriptions Disp Refills    Semaglutide,0.25 or 0.5MG/DOS, (OZEMPIC, 0.25 OR 0.5 MG/DOSE,) 2 MG/1.5ML SOPN 1 pen 1     Sig: Inject 0.25 mg into the skin once a week for 28 days, THEN 0.5 mg once a week for 14 days.

## 2022-07-08 ENCOUNTER — TELEPHONE (OUTPATIENT)
Dept: PRIMARY CARE CLINIC | Age: 52
End: 2022-07-08

## 2022-07-08 NOTE — TELEPHONE ENCOUNTER
Hello I have taken 4 doses of .25mg and 2 doses so far of the .50mg  after the next 2 doses of the .50 mg so I increase to the final 1mg?   Im tolerating it well and in 6 weeks have lost 10 lbs

## 2022-07-14 NOTE — PROGRESS NOTES

## 2022-07-14 NOTE — PROGRESS NOTES
Obstructive Sleep Apnea (JAMES) Screening     Patient:  Gardenia Holt    YOB: 1970      Medical Record #:  [id number]                     Date:  7/14/2022     1. Are you a loud and/or regular snorer? []  Yes       [x] No    2. Have you been observed to gasp or stop breathing during sleep? []  Yes       [x] No    3. Do you feel tired or groggy upon awakening or do you awaken with a headache?           []  Yes       [] No    4. Are you often tired or fatigued during the wake time hours? []  Yes       [] No    5. Do you fall asleep sitting, reading, watching TV or driving? []  Yes       [] No    6. Do you often have problems with memory or concentration? []  Yes       [] No    **If patient's score is ? 3 they are considered high risk for JAMES. An Anesthesia provider will evaluate the patient and develop a plan of care the day of surgery. Note:  If the patient's BMI is more than 35 kg m¯² , has neck circumference > 40 cm, and/or high blood pressure the risk is greater (© American Sleep Apnea Association, 2006).

## 2022-07-18 ENCOUNTER — ANESTHESIA EVENT (OUTPATIENT)
Dept: ENDOSCOPY | Age: 52
End: 2022-07-18
Payer: COMMERCIAL

## 2022-07-19 ENCOUNTER — HOSPITAL ENCOUNTER (OUTPATIENT)
Age: 52
Setting detail: OUTPATIENT SURGERY
Discharge: HOME | End: 2022-07-19
Attending: INTERNAL MEDICINE | Admitting: INTERNAL MEDICINE
Payer: COMMERCIAL

## 2022-07-19 ENCOUNTER — ANESTHESIA (OUTPATIENT)
Dept: ENDOSCOPY | Age: 52
End: 2022-07-19
Payer: COMMERCIAL

## 2022-07-19 VITALS
RESPIRATION RATE: 12 BRPM | BODY MASS INDEX: 28.79 KG/M2 | OXYGEN SATURATION: 93 % | SYSTOLIC BLOOD PRESSURE: 96 MMHG | DIASTOLIC BLOOD PRESSURE: 60 MMHG | WEIGHT: 190 LBS | HEART RATE: 82 BPM | HEIGHT: 68 IN | TEMPERATURE: 96.9 F

## 2022-07-19 DIAGNOSIS — Z12.11 COLON CANCER SCREENING: ICD-10-CM

## 2022-07-19 PROCEDURE — 2720000010 HC SURG SUPPLY STERILE: Performed by: INTERNAL MEDICINE

## 2022-07-19 PROCEDURE — 2580000003 HC RX 258: Performed by: INTERNAL MEDICINE

## 2022-07-19 PROCEDURE — 2500000003 HC RX 250 WO HCPCS: Performed by: NURSE ANESTHETIST, CERTIFIED REGISTERED

## 2022-07-19 PROCEDURE — 7100000010 HC PHASE II RECOVERY - FIRST 15 MIN: Performed by: INTERNAL MEDICINE

## 2022-07-19 PROCEDURE — 3609010300 HC COLONOSCOPY W/BIOPSY SINGLE/MULTIPLE: Performed by: INTERNAL MEDICINE

## 2022-07-19 PROCEDURE — 2709999900 HC NON-CHARGEABLE SUPPLY: Performed by: INTERNAL MEDICINE

## 2022-07-19 PROCEDURE — 3700000000 HC ANESTHESIA ATTENDED CARE: Performed by: INTERNAL MEDICINE

## 2022-07-19 PROCEDURE — 88305 TISSUE EXAM BY PATHOLOGIST: CPT

## 2022-07-19 PROCEDURE — 3609010600 HC COLONOSCOPY POLYPECTOMY SNARE/COLD BIOPSY: Performed by: INTERNAL MEDICINE

## 2022-07-19 PROCEDURE — 3609019800 HC COLONOSCOPY WITH SUBMUCOSAL INJECTION: Performed by: INTERNAL MEDICINE

## 2022-07-19 PROCEDURE — 2580000003 HC RX 258: Performed by: NURSE ANESTHETIST, CERTIFIED REGISTERED

## 2022-07-19 PROCEDURE — 6360000002 HC RX W HCPCS: Performed by: NURSE ANESTHETIST, CERTIFIED REGISTERED

## 2022-07-19 PROCEDURE — 7100000011 HC PHASE II RECOVERY - ADDTL 15 MIN: Performed by: INTERNAL MEDICINE

## 2022-07-19 PROCEDURE — 3700000001 HC ADD 15 MINUTES (ANESTHESIA): Performed by: INTERNAL MEDICINE

## 2022-07-19 RX ORDER — SODIUM CHLORIDE, SODIUM LACTATE, POTASSIUM CHLORIDE, CALCIUM CHLORIDE 600; 310; 30; 20 MG/100ML; MG/100ML; MG/100ML; MG/100ML
INJECTION, SOLUTION INTRAVENOUS CONTINUOUS PRN
Status: DISCONTINUED | OUTPATIENT
Start: 2022-07-19 | End: 2022-07-19 | Stop reason: SDUPTHER

## 2022-07-19 RX ORDER — PROPOFOL 10 MG/ML
INJECTION, EMULSION INTRAVENOUS PRN
Status: DISCONTINUED | OUTPATIENT
Start: 2022-07-19 | End: 2022-07-19 | Stop reason: SDUPTHER

## 2022-07-19 RX ORDER — LIDOCAINE HYDROCHLORIDE 20 MG/ML
INJECTION, SOLUTION INFILTRATION; PERINEURAL PRN
Status: DISCONTINUED | OUTPATIENT
Start: 2022-07-19 | End: 2022-07-19 | Stop reason: SDUPTHER

## 2022-07-19 RX ORDER — LIDOCAINE HYDROCHLORIDE 10 MG/ML
0.1 INJECTION, SOLUTION EPIDURAL; INFILTRATION; INTRACAUDAL; PERINEURAL
Status: DISCONTINUED | OUTPATIENT
Start: 2022-07-19 | End: 2022-07-19 | Stop reason: HOSPADM

## 2022-07-19 RX ORDER — SODIUM CHLORIDE, SODIUM LACTATE, POTASSIUM CHLORIDE, CALCIUM CHLORIDE 600; 310; 30; 20 MG/100ML; MG/100ML; MG/100ML; MG/100ML
INJECTION, SOLUTION INTRAVENOUS ONCE
Status: COMPLETED | OUTPATIENT
Start: 2022-07-19 | End: 2022-07-19

## 2022-07-19 RX ADMIN — PROPOFOL 100 MG: 10 INJECTION, EMULSION INTRAVENOUS at 11:05

## 2022-07-19 RX ADMIN — PROPOFOL 50 MG: 10 INJECTION, EMULSION INTRAVENOUS at 11:14

## 2022-07-19 RX ADMIN — PROPOFOL 50 MG: 10 INJECTION, EMULSION INTRAVENOUS at 11:20

## 2022-07-19 RX ADMIN — PROPOFOL 50 MG: 10 INJECTION, EMULSION INTRAVENOUS at 11:16

## 2022-07-19 RX ADMIN — SODIUM CHLORIDE, POTASSIUM CHLORIDE, SODIUM LACTATE AND CALCIUM CHLORIDE: 600; 310; 30; 20 INJECTION, SOLUTION INTRAVENOUS at 09:41

## 2022-07-19 RX ADMIN — PROPOFOL 100 MG: 10 INJECTION, EMULSION INTRAVENOUS at 11:08

## 2022-07-19 RX ADMIN — PROPOFOL 50 MG: 10 INJECTION, EMULSION INTRAVENOUS at 11:18

## 2022-07-19 RX ADMIN — SODIUM CHLORIDE, SODIUM LACTATE, POTASSIUM CHLORIDE, AND CALCIUM CHLORIDE: .6; .31; .03; .02 INJECTION, SOLUTION INTRAVENOUS at 10:55

## 2022-07-19 RX ADMIN — LIDOCAINE HYDROCHLORIDE 100 MG: 20 INJECTION, SOLUTION INFILTRATION; PERINEURAL at 11:05

## 2022-07-19 RX ADMIN — PROPOFOL 50 MG: 10 INJECTION, EMULSION INTRAVENOUS at 11:10

## 2022-07-19 RX ADMIN — PROPOFOL 60 MG: 10 INJECTION, EMULSION INTRAVENOUS at 11:28

## 2022-07-19 RX ADMIN — PROPOFOL 50 MG: 10 INJECTION, EMULSION INTRAVENOUS at 11:12

## 2022-07-19 ASSESSMENT — PAIN - FUNCTIONAL ASSESSMENT: PAIN_FUNCTIONAL_ASSESSMENT: 0-10

## 2022-07-19 NOTE — H&P
daughters; both graduated college and work     Social Determinants of [address] Strain: Low Risk     Difficulty of Paying Living Expenses: Not hard at all   Food Insecurity: No Food Insecurity    Worried About [address] in the Last Year: Never true    [facility] in the Last Year: Never true   Transportation Needs: Not on file   Physical Activity: Not on file   Stress: Not on file   Social Connections: Not on file   Intimate Partner Violence: Not on file   Housing Stability: Not on file       Family History:   Family History   Problem Relation Age of Onset    Heart Disease Mother         4 stents, aortic valve replacement     Dementia Mother     Heart Disease Father     Cancer Maternal Aunt     Cancer Paternal Aunt     Cancer Brother         esophageal and stomach CA x 6 years, multiple myeloma       Home Medications:   Prior to Admission medications    Medication Sig Start Date End Date Taking? Authorizing Provider   Semaglutide,0.25 or 0.5MG/DOS, (OZEMPIC, 0.25 OR 0.5 MG/DOSE,) 2 MG/1.5ML SOPN Inject 0.5 mg into the skin once a week 7/5/22   Merline Arrow. Eston Junior., DO   SUMAtriptan (IMITREX) 100 MG tablet TAKE 1/2 TO 1 TABLET BY MOUTH AT ONSET OF HEADACHE. MAY REPEAT DOSE IN 2 HOURS IF NO RELIEF. DO NOT EXCEED 2 TABLETS IN 24 HOURS 12/20/21   Merline Arrow. Mahad Gar., DO   aspirin-acetaminophen-caffeine (EXCEDRIN MIGRAINE) 624-123-07 MG per tablet Take 1 tablet by mouth every 6 hours as needed for Headaches    Historical Provider, MD       Review of Systems:  Weight Loss: No  Dysphagia: No  Dyspepsia: No    Physical Exam:   Vital Signs: /77   Pulse 81   Temp 96.9 °F (36.1 °C) (Temporal)   Resp 16   Ht 5' 8\" (1.727 m)   Wt 190 lb (86.2 kg)   SpO2 98%   BMI 28.89 kg/m²   Vital signs reviewed:Yes    HEENT:Normal  Cardiac:Normal  Chest:Normal  Abdomen:Normal  Exts: Normal  Neuro:Normal    Labs:  No visits with results within 12 Week(s) from this visit.    Latest known visit with results is:   Hospital Outpatient Visit on 04/18/2022   Component Date Value Ref Range Status    Cholesterol, Total 04/18/2022 277 (A) 0 - 199 mg/dL Final    Triglycerides 04/18/2022 177 (A) 0 - 150 mg/dL Final    HDL 04/18/2022 51  40 - 60 mg/dL Final    LDL Calculated 04/18/2022 191 (A) <100 mg/dL Final    VLDL Cholesterol Calculated 04/18/2022 35  Not Established mg/dL Final    Hemoglobin A1C 04/18/2022 5.3  See comment % Final    eAG 04/18/2022 105.4  mg/dL Final    HIV Ag/Ab 04/18/2022 Non-Reactive  Non-reactive Final    HIV-1 Antibody 04/18/2022 Non-Reactive  Non-reactive Final    HIV ANTIGEN 04/18/2022 Non-Reactive  Non-reactive Final    HIV-2 Ab 04/18/2022 Non-Reactive  Non-reactive Final    Hep C Ab Interp 04/18/2022 Non-reactive  Non-reactive Final        Imaging:  No orders to display       ASA:1    Mallampati Score: II    Sedation planned:MAC    Patient in acceptable condition for procedure:Yes    11:31 AM 7/19/2022    Franco Finley, DO      Please note that some or all of this record was generated using voice recognition software. If there are any questions about the content of this document, please contact the author as some errors in transcription may have occurred. The patient and I discussed that this is an elective procedure/surgery. We discussed the risks of the procedure/surgery, including but not limited to what is outlined in the signed informed consent. We also discussed the risk of pau COVID 19 while in the facility. We discussed the increased risk of a bad outcome should the patient contract COVID 19 during the post-procedural/post-operative period, given the patients current health condition, chronic conditions, and the added risk of COVID 19 in light of these conditions. The patient and I also discussed the risk of further postponing the procedure/surgery and other treatment alternatives, including non-procedural/surgical treatments.  Understanding all of the risks, benefits, and alternatives, the patient made an informed decision to proceed with the procedure/surgery.

## 2022-07-19 NOTE — ANESTHESIA PRE PROCEDURE
Department of Anesthesiology  Preprocedure Note       Name:  Erika Pitts   Age:  46 y.o.  :  1970                                          MRN:  [id number]         Date:  2022      Surgeon: Randy Burdick):  Thomas Duval DO    Procedure: Procedure(s):  COLONOSCOPY    Medications prior to admission:   Prior to Admission medications    Medication Sig Start Date End Date Taking? Authorizing Provider   Semaglutide,0.25 or 0.5MG/DOS, (OZEMPIC, 0.25 OR 0.5 MG/DOSE,) 2 MG/1.5ML SOPN Inject 0.5 mg into the skin once a week 22   Merlineandria Arndt. Mahad Gar., DO   SUMAtriptan (IMITREX) 100 MG tablet TAKE 1/2 TO 1 TABLET BY MOUTH AT ONSET OF HEADACHE. MAY REPEAT DOSE IN 2 HOURS IF NO RELIEF. DO NOT EXCEED 2 TABLETS IN 24 HOURS 21   Merline Arrow. Mahad Gar., DO   aspirin-acetaminophen-caffeine (EXCEDRIN MIGRAINE) 456-595-14 MG per tablet Take 1 tablet by mouth every 6 hours as needed for Headaches    Historical Provider, MD       Current medications:    Current Facility-Administered Medications   Medication Dose Route Frequency Provider Last Rate Last Admin    lidocaine PF 1 % injection 0.1 mL  0.1 mL IntraDERmal Once PRN Matias Sr MD           Allergies: Allergies   Allergen Reactions    Oxycodone-Acetaminophen Itching       Problem List:    Patient Active Problem List   Diagnosis Code    Rotator cuff tendinitis M75.80    Left shoulder pain M25.512    Migraines G43.909    Obesity (BMI 30-39. 9) E66.9    Low back pain M54.50    Rosacea L71.9    Chronic thumb pain, left M79.645, G89.29    Scalp cyst L72.9    Neoplasm of uncertain behavior of skin D48.5       Past Medical History:        Diagnosis Date    Cornea abrasion     cornea tear     Migraines     uses imitrex as needed       Past Surgical History:        Procedure Laterality Date    ABDOMINOPLASTY      APPENDECTOMY      ARM SURGERY N/A 2020    EXCISION SCALP CYST x TWO, EXCISION SKIN LESION LEFT LEG performed by [facility] Kimber Saunders MD at [address] [place] [address]  12-5-2012    AMBER BREAST REDUCTION; ABDOMINOPLASTY    CYST REMOVAL      EXCISION SCALP CYST x TWO, EXCISION SKIN LESION LEFT LEG     DENTAL SURGERY      teeth extracted    ENDOMETRIAL ABLATION  2007       Social History:    Social History     Tobacco Use    Smoking status: Never    Smokeless tobacco: Never   Substance Use Topics    Alcohol use: Not Currently     Alcohol/week: 0.0 standard drinks                                Counseling given: Not Answered      Vital Signs (Current):   Vitals:    07/14/22 0853 07/19/22 0930   BP:  131/77   Pulse:  81   Resp:  16   Temp:  96.9 °F (36.1 °C)   TempSrc:  Temporal   SpO2:  98%   Weight: 190 lb (86.2 kg) 190 lb (86.2 kg)   Height: 5' 8\" (1.727 m) 5' 8\" (1.727 m)                                              BP Readings from Last 3 Encounters:   07/19/22 131/77   04/18/22 118/82   01/06/21 118/76       NPO Status: Time of last liquid consumption: 0600                        Time of last solid consumption: 0700                        Date of last liquid consumption: 07/19/22                        Date of last solid food consumption: 07/18/22    BMI:   Wt Readings from Last 3 Encounters:   07/19/22 190 lb (86.2 kg)   04/18/22 212 lb 9.6 oz (96.4 kg)   01/06/21 207 lb 9.6 oz (94.2 kg)     Body mass index is 28.89 kg/m².     CBC:   Lab Results   Component Value Date/Time    WBC 7.8 09/29/2016 09:36 AM    RBC 5.10 09/29/2016 09:36 AM    HGB 15.1 09/29/2016 09:36 AM    HCT 44.9 09/29/2016 09:36 AM    MCV 88.0 09/29/2016 09:36 AM    RDW 13.4 09/29/2016 09:36 AM     09/29/2016 09:36 AM       CMP:   Lab Results   Component Value Date/Time     10/31/2018 09:29 AM    K 4.2 10/31/2018 09:29 AM     10/31/2018 09:29 AM    CO2 25 10/31/2018 09:29 AM    BUN 14 10/31/2018 09:29 AM    CREATININE 0.6 10/31/2018 09:29 AM    GFRAA >60 10/31/2018 09:29 AM    GFRAA >60 09/20/2012 09:37 PM    AGRATIO 1.5 10/31/2018 09:29 AM    LABGLOM >60 10/31/2018 09:29 AM    GLUCOSE 91 10/31/2018 09:29 AM    PROT 7.5 10/31/2018 09:29 AM    PROT 7.5 09/20/2012 09:37 PM    CALCIUM 10.0 10/31/2018 09:29 AM    BILITOT 0.4 10/31/2018 09:29 AM    ALKPHOS 93 10/31/2018 09:29 AM    AST 24 10/31/2018 09:29 AM    ALT 28 10/31/2018 09:29 AM       POC Tests: No results for input(s): POCGLU, POCNA, POCK, POCCL, POCBUN, POCHEMO, POCHCT in the last 72 hours. Coags: No results found for: PROTIME, INR, APTT    HCG (If Applicable):   Lab Results   Component Value Date    PREGTESTUR Negative 11/05/2012        ABGs: No results found for: PHART, PO2ART, YTU7OCD, MQA4CBP, BEART, L7IUBGPE     Type & Screen (If Applicable):  Lab Results   Component Value Date    LABABO B 11/05/2012    [address] Positive 11/05/2012       Drug/Infectious Status (If Applicable):  No results found for: HIV, HEPCAB    COVID-19 Screening (If Applicable):   Lab Results   Component Value Date/Time    COVID19 Not Detected 05/14/2020 03:47 PM           Anesthesia Evaluation  Patient summary reviewed no history of anesthetic complications:   Airway: Mallampati: II  TM distance: >3 FB   Neck ROM: full  Mouth opening: > = 3 FB   Dental: normal exam   (+) implants      Pulmonary:Negative Pulmonary ROS and normal exam  breath sounds clear to auscultation                             Cardiovascular:Negative CV ROS  Exercise tolerance: good (>4 METS),           Rhythm: regular  Rate: normal                    Neuro/Psych:   (+) headaches: migraine headaches,             GI/Hepatic/Renal:            ROS comment: BMI 29.   Endo/Other: Negative Endo/Other ROS                    Abdominal:             Vascular: negative vascular ROS. Other Findings:           Anesthesia Plan      general     ASA 2       Induction: intravenous. Anesthetic plan and risks discussed with patient. Plan discussed with CRNA.                     Mar Lara MD   7/19/2022

## 2022-07-19 NOTE — ANESTHESIA POSTPROCEDURE EVALUATION
Department of Anesthesiology  Postprocedure Note    Patient: Hola Mccain  MRN: [id number]  YOB: 1970  Date of evaluation: 7/19/2022      Procedure Summary     Date: 07/19/22 Room / Location: [place] [address] [place] [address] / [facility]    Anesthesia Start: 1055 Anesthesia Stop: 1137    Procedures:       COLONOSCOPY WITH BIOPSY      COLONOSCOPY POLYPECTOMY SNARE/COLD BIOPSY      COLONOSCOPY SUBMUCOSAL/INK TATTOO  INJECTION Diagnosis:       Colon cancer screening      (Colon cancer screening [Z12.11])    Surgeons: Leo Zendejas DO Responsible Provider: Muna Encinas MD    Anesthesia Type: general ASA Status: 2          Anesthesia Type: No value filed.     Patricia Phase I: Patricia Score: 10    Patricia Phase II: Patricia Score: 10      Anesthesia Post Evaluation    Patient location during evaluation: PACU  Patient participation: complete - patient participated  Level of consciousness: awake and alert  Airway patency: patent  Nausea & Vomiting: no nausea and no vomiting  Complications: no  Cardiovascular status: hemodynamically stable  Respiratory status: acceptable, room air and spontaneous ventilation  Hydration status: stable

## 2022-07-19 NOTE — PROCEDURES
COLONOSCOPY     Patient: Erika Pitts MRN: [id number]   YOB: 1970 Age: 46 y.o. Sex: female   Unit: [address] [facility] ENDO Room/Bed: [facility] Endo Pool/NONE Location: [address] [facility]    Admitting Physician: Meredith Lazcano     Primary Care Physician: Merline Arrow. Eston Junior., DO      DATE OF PROCEDURE: 7/19/2022  PROCEDURE: Colonoscopy    PREOPERATIVE DIAGNOSIS: Colon cancer screening [Z12.11]  HPI: This is a 46y.o. year old female who presents today with CRCS. ENDOSCOPIST: John Hamilton DO    POSTOPERATIVE DIAGNOSIS:    Rectal mass at 8 cm 3-4 cm in size with central ulceration  2 , 7 mm polyps    PLAN:   Await biopsies    INFORMED CONSENT:  Informed consent for colonoscopy was obtained. The benefits and risks including adverse medicine reaction and perforation have been explained. The patient's questions were answered and the patient agreed to proceed. ASA: ASA 1 - Normal health patient     SEDATION: MAC    The patient's vital signs, cardiac status, pulmonary status, abdominal status and mental status were stable for the procedure. The patient's vital signs and respiratory function as monitored by oxygen saturation remained stable. COLON PREPARATION:  The patient was given a split colon preparation and the preparation was adequate. Procedure Details: An anal exam was performed and this was unremarkable. A digital rectal exam was performed and no masses palpated. The Olympus videocolonoscope  was inserted in the rectum and carefully advanced to the cecum as identified by IC valve, crow's foot appearance and appendix. The cecum was photodocumented. The colonoscope was slowly withdrawn and retrograde examination of the colon was carefully performed with inspection around and between folds. The ascending colon and cecum were intubated twice with repeat antegrade and retrograde examination.   There is noted to be 2 5 to 7 mm polyps in the descending colon both removed with cold snare. In the rectum there was noted to be above a 3 to 4 cm mass with a central ulceration extended to approximately 8 cm from the anorectal junction. Biopsies were obtained with a jumbo forceps and a tattoo was placed just distal to the rectal mass. Retroflexion in the rectum was performed.    Cecum Intubated: Yes    Findings: Rectal mass at 8 cm 3-4 cm in size with central ulceration  2 , 7 mm polyps    PLAN:   Await biopsies      Estimated Blood Loss:  Minimal  Complications: None    Signed By: Yenifer Vaughn DO

## 2022-07-26 ENCOUNTER — HOSPITAL ENCOUNTER (OUTPATIENT)
Dept: CT IMAGING | Age: 52
Discharge: HOME | End: 2022-07-26
Payer: COMMERCIAL

## 2022-07-26 ENCOUNTER — HOSPITAL ENCOUNTER (OUTPATIENT)
Age: 52
Discharge: HOME | End: 2022-07-26
Payer: COMMERCIAL

## 2022-07-26 DIAGNOSIS — C20 RECTAL CANCER (HCC): ICD-10-CM

## 2022-07-26 PROCEDURE — 6360000004 HC RX CONTRAST MEDICATION: Performed by: INTERNAL MEDICINE

## 2022-07-26 PROCEDURE — 74177 CT ABD & PELVIS W/CONTRAST: CPT

## 2022-07-26 RX ADMIN — IOHEXOL 50 ML: 240 INJECTION, SOLUTION INTRATHECAL; INTRAVASCULAR; INTRAVENOUS; ORAL at 13:10

## 2022-07-26 RX ADMIN — IOPAMIDOL 75 ML: 755 INJECTION, SOLUTION INTRAVENOUS at 13:10

## 2022-08-01 ENCOUNTER — HOSPITAL ENCOUNTER (OUTPATIENT)
Age: 52
Setting detail: OUTPATIENT SURGERY
Discharge: HOME | End: 2022-08-01
Attending: INTERNAL MEDICINE | Admitting: INTERNAL MEDICINE
Payer: COMMERCIAL

## 2022-08-01 ENCOUNTER — ANESTHESIA EVENT (OUTPATIENT)
Dept: ENDOSCOPY | Age: 52
End: 2022-08-01
Payer: COMMERCIAL

## 2022-08-01 ENCOUNTER — ANESTHESIA (OUTPATIENT)
Dept: ENDOSCOPY | Age: 52
End: 2022-08-01
Payer: COMMERCIAL

## 2022-08-01 VITALS
HEIGHT: 68 IN | HEART RATE: 68 BPM | SYSTOLIC BLOOD PRESSURE: 112 MMHG | WEIGHT: 194 LBS | TEMPERATURE: 97.5 F | BODY MASS INDEX: 29.4 KG/M2 | DIASTOLIC BLOOD PRESSURE: 82 MMHG | RESPIRATION RATE: 18 BRPM | OXYGEN SATURATION: 98 %

## 2022-08-01 DIAGNOSIS — C20 RECTAL CANCER (HCC): Primary | ICD-10-CM

## 2022-08-01 PROCEDURE — 2500000003 HC RX 250 WO HCPCS: Performed by: NURSE ANESTHETIST, CERTIFIED REGISTERED

## 2022-08-01 PROCEDURE — 2580000003 HC RX 258: Performed by: INTERNAL MEDICINE

## 2022-08-01 PROCEDURE — 7100000010 HC PHASE II RECOVERY - FIRST 15 MIN: Performed by: INTERNAL MEDICINE

## 2022-08-01 PROCEDURE — 6360000002 HC RX W HCPCS: Performed by: NURSE ANESTHETIST, CERTIFIED REGISTERED

## 2022-08-01 PROCEDURE — 7100000011 HC PHASE II RECOVERY - ADDTL 15 MIN: Performed by: INTERNAL MEDICINE

## 2022-08-01 PROCEDURE — 3700000001 HC ADD 15 MINUTES (ANESTHESIA): Performed by: INTERNAL MEDICINE

## 2022-08-01 PROCEDURE — 3609020700 HC SIGMOIDOSCOPY W/EUS: Performed by: INTERNAL MEDICINE

## 2022-08-01 PROCEDURE — 3700000000 HC ANESTHESIA ATTENDED CARE: Performed by: INTERNAL MEDICINE

## 2022-08-01 PROCEDURE — 2709999900 HC NON-CHARGEABLE SUPPLY: Performed by: INTERNAL MEDICINE

## 2022-08-01 RX ORDER — LIDOCAINE HYDROCHLORIDE 20 MG/ML
INJECTION, SOLUTION INFILTRATION; PERINEURAL PRN
Status: DISCONTINUED | OUTPATIENT
Start: 2022-08-01 | End: 2022-08-01 | Stop reason: SDUPTHER

## 2022-08-01 RX ORDER — PROPOFOL 10 MG/ML
INJECTION, EMULSION INTRAVENOUS CONTINUOUS PRN
Status: DISCONTINUED | OUTPATIENT
Start: 2022-08-01 | End: 2022-08-01 | Stop reason: SDUPTHER

## 2022-08-01 RX ORDER — SODIUM CHLORIDE, SODIUM LACTATE, POTASSIUM CHLORIDE, CALCIUM CHLORIDE 600; 310; 30; 20 MG/100ML; MG/100ML; MG/100ML; MG/100ML
INJECTION, SOLUTION INTRAVENOUS CONTINUOUS
Status: DISCONTINUED | OUTPATIENT
Start: 2022-08-01 | End: 2022-08-01 | Stop reason: HOSPADM

## 2022-08-01 RX ADMIN — PROPOFOL 150 MCG/KG/MIN: 10 INJECTION, EMULSION INTRAVENOUS at 14:12

## 2022-08-01 RX ADMIN — LIDOCAINE HYDROCHLORIDE 50 MG: 20 INJECTION, SOLUTION INFILTRATION; PERINEURAL at 14:12

## 2022-08-01 RX ADMIN — SODIUM CHLORIDE, POTASSIUM CHLORIDE, SODIUM LACTATE AND CALCIUM CHLORIDE: 600; 310; 30; 20 INJECTION, SOLUTION INTRAVENOUS at 13:58

## 2022-08-01 ASSESSMENT — PAIN - FUNCTIONAL ASSESSMENT: PAIN_FUNCTIONAL_ASSESSMENT: 0-10

## 2022-08-01 ASSESSMENT — LIFESTYLE VARIABLES: SMOKING_STATUS: 0

## 2022-08-01 NOTE — H&P
Gastroenterology Note             Pre-operative History and Physical    Patient: Tushar Laura  : 1970  CSN: [id number]    History Obtained From:  patient and/or guardian. HISTORY OF PRESENT ILLNESS:    The patient is a 46 y.o. female  here for rectal CA staging. Past Medical History:    Past Medical History:   Diagnosis Date    Cornea abrasion     cornea tear     Migraines     uses imitrex as needed     Past Surgical History:    Past Surgical History:   Procedure Laterality Date    ABDOMINOPLASTY      APPENDECTOMY      ARM SURGERY N/A 2020    EXCISION SCALP CYST x TWO, EXCISION SKIN LESION LEFT LEG performed by Janae Villagran MD at [address] [place] [address]  2012    AMBER BREAST REDUCTION; ABDOMINOPLASTY    COLONOSCOPY N/A 2022    COLONOSCOPY WITH BIOPSY performed by Briseida Smith DO at [address] N/A 2022    COLONOSCOPY POLYPECTOMY SNARE/COLD BIOPSY performed by Briseida Smith DO at [address]  2022    COLONOSCOPY SUBMUCOSAL/INK TATTOO  INJECTION performed by Briseida Smith DO at [facility] x TWO, EXCISION SKIN LESION LEFT LEG     DENTAL SURGERY      teeth extracted    ENDOMETRIAL ABLATION       Medications Prior to Admission:   No current facility-administered medications on file prior to encounter. Current Outpatient Medications on File Prior to Encounter   Medication Sig Dispense Refill    Semaglutide,0.25 or 0.5MG/DOS, (OZEMPIC, 0.25 OR 0.5 MG/DOSE,) 2 MG/1.5ML SOPN Inject 0.5 mg into the skin once a week 3 pen 3    SUMAtriptan (IMITREX) 100 MG tablet TAKE 1/2 TO 1 TABLET BY MOUTH AT ONSET OF HEADACHE. MAY REPEAT DOSE IN 2 HOURS IF NO RELIEF.  DO NOT EXCEED 2 TABLETS IN 24 HOURS 9 tablet 5    aspirin-acetaminophen-caffeine (EXCEDRIN MIGRAINE) 825-966-26 MG per tablet Take 1 tablet by mouth every 6 hours as needed for Headaches          Allergies:  Oxycodone-acetaminophen      Social History:   Social History     Tobacco Use    Smoking status: Never    Smokeless tobacco: Never   Substance Use Topics    Alcohol use: Not Currently     Alcohol/week: 0.0 standard drinks     Family History:   Family History   Problem Relation Age of Onset    Heart Disease Mother         4 stents, aortic valve replacement     Dementia Mother     Heart Disease Father     Cancer Maternal Aunt     Cancer Paternal Aunt     Cancer Brother         esophageal and stomach CA x 6 years, multiple myeloma       PHYSICAL EXAM:      /63   Pulse 83   Resp 16   Ht 5' 8\" (1.727 m)   Wt 194 lb (88 kg)   SpO2 97%   BMI 29.50 kg/m²  I        Heart:   within normal limits    Lungs:  CTA bilat anteriorly,  Normal effort    Abdomen:   soft, NT, ND      ASA Grade:  ASA 2 - Patient with mild systemic disease with no functional limitations    Mallampati Class: 2      ASSESSMENT AND PLAN:    1. Patient is a 46 y.o. female here for EGD/Colonoscopy. 2.  Procedure options, risks and benefits reviewed with patient. Patient expresses understanding and wishes to proceed. Makenna Murdock MD,   [facility]  8/1/2022    Please note that some or all of this record was generated using voice recognition software. If there are any questions about the content of this document, please contact the author as some errors in translation may have occurred.

## 2022-08-01 NOTE — PROGRESS NOTES
Discharge instructions reviewed with patient/responsible adult and understanding verbalized. Discharge instructions signed and copies given. Patient discharged home with belongings. Pt left per w/c to go home with her spouse.  No c/o

## 2022-08-01 NOTE — ANESTHESIA POSTPROCEDURE EVALUATION
Department of Anesthesiology  Postprocedure Note    Patient: Alicia Castro  MRN: [id number]  YOB: 1970  Date of evaluation: 8/1/2022      Procedure Summary     Date: 08/01/22 Room / Location: [facility]    Anesthesia Start: 1409 Anesthesia Stop: 6283    Procedure: ENDOSCOPIC ULTRASOUND OF RECTUM Diagnosis:       Rectal cancer ([address]      (Rectal cancer ([address] [C20])    Surgeons: Yousif Irvin MD Responsible Provider: Liliana Gaitan DO    Anesthesia Type: general ASA Status: 2          Anesthesia Type: No value filed.     Patricia Phase I: Patricia Score: 10    Patricia Phase II: Patricia Score: 9      Anesthesia Post Evaluation    Patient location during evaluation: PACU  Patient participation: complete - patient participated  Level of consciousness: awake and alert  Airway patency: patent  Nausea & Vomiting: no nausea and no vomiting  Complications: no  Cardiovascular status: hemodynamically stable  Respiratory status: acceptable  Hydration status: euvolemic  Multimodal analgesia pain management approach

## 2022-08-01 NOTE — PROCEDURES
Rectal EUS Endoscopy Note    Patient: Orquidea Choe  : 1970  CSN: [id number]    Procedure: Rectal Ultrasound. Date:  2022     Surgeon:  Azul Jernigan MD     Referring Physician:  Leyla Gleason. Sherrill Guido,     Preoperative Diagnosis:  Rectal cancer ([facility] [address] [C20]    Postoperative Diagnosis:  * No post-op diagnosis entered *    Anesthesia:  MAC    EBL: minimal to none. Indications: This is a 46y.o. year old female who presents today with recent colonoscopy showing rectal cancer at approximately 8 to 10 cm from the anal verge. [facility] Description of Procedure:  Informed consent was obtained from the patient after explanation of indications, benefits and possible risks and complications of the procedure. The patient was then taken to the endoscopy suite, placed in the left lateral decubitus position and the above IV sedation was administrered. Radial echoendoscope was advanced to the anus and advanced into the mid sigmoid colon. Examination and evaluation was performed upon slow withdrawal of the endoscope. Findings and interventions are described below. The patient was decompressed and the scope was then withdrawn. The patient tolerated the procedure well and was taken to the post anesthesia care unit in good condition. There were no immediate complications. Impression:    T2 N0 M0-rectal cancer located approximately 10 cm in the anal verge. Recommendations:   Patient has been referred to Dr. Braulio Castorena for further evaluation and surgical resection. Patient sees Dr. Carrington Luna MD, MD  [address]  [phone]    Please note that some or all of this record was generated using voice recognition software. If there are any questions about the content of this document, please contact the author as some errors in translation may have occurred.

## 2022-08-01 NOTE — ANESTHESIA PRE PROCEDURE
Department of Anesthesiology  Preprocedure Note       Name:  Dee Lamas   Age:  46 y.o.  :  1970                                          MRN:  [id number]         Date:  2022      Surgeon: Bebeto Gaspar):  Mayito Barboza MD    Procedure: Procedure(s):  ENDOSCOPIC ULTRASOUND OF RECTUM    Medications prior to admission:   Prior to Admission medications    Medication Sig Start Date End Date Taking? Authorizing Provider   Semaglutide,0.25 or 0.5MG/DOS, (OZEMPIC, 0.25 OR 0.5 MG/DOSE,) 2 MG/1.5ML SOPN Inject 0.5 mg into the skin once a week 22   Sam Call. Adiel Cameron., DO   SUMAtriptan (IMITREX) 100 MG tablet TAKE 1/2 TO 1 TABLET BY MOUTH AT ONSET OF HEADACHE. MAY REPEAT DOSE IN 2 HOURS IF NO RELIEF. DO NOT EXCEED 2 TABLETS IN 24 HOURS 21   Sam Drummond Adiel Cameron., DO   aspirin-acetaminophen-caffeine (EXCEDRIN MIGRAINE) 986-142-69 MG per tablet Take 1 tablet by mouth every 6 hours as needed for Headaches    Historical Provider, MD       Current medications:    No current outpatient medications on file. No current facility-administered medications for this visit. Allergies: Allergies   Allergen Reactions    Oxycodone-Acetaminophen Itching       Problem List:    Patient Active Problem List   Diagnosis Code    Rotator cuff tendinitis M75.80    Left shoulder pain M25.512    Migraines G43.909    Obesity (BMI 30-39. 9) E66.9    Low back pain M54.50    Rosacea L71.9    Chronic thumb pain, left M79.645, G89.29    Scalp cyst L72.9    Neoplasm of uncertain behavior of skin D48.5       Past Medical History:        Diagnosis Date    Cornea abrasion     cornea tear     Migraines     uses imitrex as needed       Past Surgical History:        Procedure Laterality Date    ABDOMINOPLASTY      APPENDECTOMY  1983    ARM SURGERY N/A 2020    EXCISION SCALP CYST x TWO, EXCISION SKIN LESION LEFT LEG performed by Karan Mena MD at [address] [place] [address]  2012    AMBER BREAST REDUCTION; ABDOMINOPLASTY    COLONOSCOPY N/A 7/19/2022    COLONOSCOPY WITH BIOPSY performed by Arianna Cortes DO at [facility] N/A 7/19/2022    COLONOSCOPY POLYPECTOMY SNARE/COLD BIOPSY performed by Arianna Cortes DO at [facility]  7/19/2022    COLONOSCOPY SUBMUCOSAL/INK TATTOO  INJECTION performed by Arianna Cortes DO at [facility] CYST x TWO, EXCISION SKIN LESION LEFT LEG     DENTAL SURGERY      teeth extracted    ENDOMETRIAL ABLATION  2007       Social History:    Social History     Tobacco Use    Smoking status: Never    Smokeless tobacco: Never   Substance Use Topics    Alcohol use: Not Currently     Alcohol/week: 0.0 standard drinks                                Counseling given: Not Answered      Vital Signs (Current): There were no vitals filed for this visit.                                            BP Readings from Last 3 Encounters:   07/19/22 96/60   04/18/22 118/82   01/06/21 118/76       NPO Status:                                                                                 BMI:   Wt Readings from Last 3 Encounters:   07/19/22 190 lb (86.2 kg)   04/18/22 212 lb 9.6 oz (96.4 kg)   01/06/21 207 lb 9.6 oz (94.2 kg)     There is no height or weight on file to calculate BMI.    CBC:   Lab Results   Component Value Date/Time    WBC 7.8 09/29/2016 09:36 AM    RBC 5.10 09/29/2016 09:36 AM    HGB 15.1 09/29/2016 09:36 AM    HCT 44.9 09/29/2016 09:36 AM    MCV 88.0 09/29/2016 09:36 AM    RDW 13.4 09/29/2016 09:36 AM     09/29/2016 09:36 AM       CMP:   Lab Results   Component Value Date/Time     10/31/2018 09:29 AM    K 4.2 10/31/2018 09:29 AM     10/31/2018 09:29 AM    CO2 25 10/31/2018 09:29 AM    BUN 14 10/31/2018 09:29 AM    CREATININE 0.6 10/31/2018 09:29 AM    GFRAA >60 10/31/2018 09:29 AM    GFRAA >60 09/20/2012 09:37 PM    AGRATIO 1.5 10/31/2018 09:29 AM LABGLOM >60 10/31/2018 09:29 AM    GLUCOSE 91 10/31/2018 09:29 AM    PROT 7.5 10/31/2018 09:29 AM    PROT 7.5 09/20/2012 09:37 PM    CALCIUM 10.0 10/31/2018 09:29 AM    BILITOT 0.4 10/31/2018 09:29 AM    ALKPHOS 93 10/31/2018 09:29 AM    AST 24 10/31/2018 09:29 AM    ALT 28 10/31/2018 09:29 AM       POC Tests: No results for input(s): POCGLU, POCNA, POCK, POCCL, POCBUN, POCHEMO, POCHCT in the last 72 hours. Coags: No results found for: PROTIME, INR, APTT    HCG (If Applicable):   Lab Results   Component Value Date    PREGTESTUR Negative 11/05/2012        ABGs: No results found for: PHART, PO2ART, GUR9LXZ, WYD4PUO, BEART, J3YBJPLX     Type & Screen (If Applicable):  Lab Results   Component Value Date    LABABO B 11/05/2012    [address] Positive 11/05/2012       Drug/Infectious Status (If Applicable):  No results found for: HIV, HEPCAB    COVID-19 Screening (If Applicable):   Lab Results   Component Value Date/Time    COVID19 Not Detected 05/14/2020 03:47 PM           Anesthesia Evaluation  Patient summary reviewed and Nursing notes reviewed no history of anesthetic complications:   Airway: Mallampati: II  TM distance: >3 FB   Neck ROM: full  Mouth opening: > = 3 FB   Dental: normal exam   (+) implants      Pulmonary:Negative Pulmonary ROS and normal exam  breath sounds clear to auscultation      (-) not a current smoker (NEVER)                           Cardiovascular:Negative CV ROS  Exercise tolerance: good (>4 METS),       (-) past MI    NYHA Classification: I    Rhythm: regular  Rate: normal           Beta Blocker:  Not on Beta Blocker         Neuro/Psych:   (+) headaches: migraine headaches,             GI/Hepatic/Renal:        (-) GERD      ROS comment: BMI 29.   Endo/Other: Negative Endo/Other ROS   (+) malignancy/cancer (RECTAL CA ). Abdominal:             Vascular: negative vascular ROS.          Other Findings:             Anesthesia Plan      general     ASA 2       Induction: intravenous. MIPS: Prophylactic antiemetics administered. Anesthetic plan and risks discussed with patient. Plan discussed with CRNA.     Attending anesthesiologist reviewed and agrees with Preprocedure content                Liliana Gaitan DO   8/1/2022

## 2022-08-01 NOTE — DISCHARGE INSTRUCTIONS
Follow up with Dr Dionicio Aparicio. His office will reach out to you for an appointment. If you do not hear from his office in the next 2 to 3 days, please reach out to me. ENDOSCOPY DISCHARGE INSTRUCTIONS:    Call the physician that did your procedure for any questions or concern:    [facility]: [phone]  DR. GEOVANI RIZZO      ACTIVITY:    There are potential side effects to the medications used for sedation and anesthesia during your procedure. These include:  Dizziness or light-headedness, confusion or memory loss, delayed reaction times, loss of coordination, nausea and vomiting. Because of your increased risk for injury, we ask that you observe the following precautions: For the next 24 hours,  DO NOT operate an automobile, bicycle, motorcycle, , power tools or large equipment of any kind. Do not drink alcohol, sign any legal documents or make any legal decisions for 24 hours. Do not bend your head over lower than your heart. DO sit on the side of bed/couch awhile before getting up. Plan on bedrest or quiet relaxation today. You may resume normal activities in 24 hours. DIET:    Your first meal today should be light, avoiding spicy and fatty foods. If you tolerate this first meal, then you may advance to your regular diet unless otherwise advised by your physician. NORMAL SYMPTOMS:  -Mild sore throat if youve had an EGD   -Gaseous discomfort    NOTIFY YOUR PHYSICIAN IF THESE SYMPTOMS OCCUR:  1. Fever (greater than 100)  5. Increased abdominal bloating  2. Severe pain    6. Excessive bleeding  3. Nausea and vomiting  7. Chest pain                                                                    4. Chills    8. Shortness of breath    ADDITIONAL INSTRUCTIONS        Please review these discharge instructions this evening or tomorrow for  information you may have forgotten. We want to thank you for choosing the [facility] as your health care provider.  We always strive to provide you with excellent care while you are here. You may receive a survey in the mail regarding your care. We would appreciate you taking a few minutes of your time to complete this survey.

## 2022-08-02 ENCOUNTER — TELEPHONE (OUTPATIENT)
Dept: SURGERY | Age: 52
End: 2022-08-02

## 2022-08-02 NOTE — TELEPHONE ENCOUNTER
Spoke to patient to schedule office visit with Dr. Dionicio Aparicio. Patient instructed tgiven phone number for central scheduling to schedule CT chest and MRI. Instructed that MRI must be done @ [facility], [facility]. and that both CT and MRI need to be done before 8/12 so case can be discussed at Tumor Board.  Dr. Dionicio Aparicio is seeing patient in office tomorrow 8/3 @ 3:45pm.

## 2022-08-06 NOTE — RESULT ENCOUNTER NOTE
Rectal adenocarcinoma patient was referred on for endoscopic ultrasound CT scan of the abdomen and pelvis along with surgical consultation. Patient should have a repeat colonoscopy in about a year

## 2022-08-08 ENCOUNTER — HOSPITAL ENCOUNTER (OUTPATIENT)
Dept: CT IMAGING | Age: 52
Discharge: HOME | End: 2022-08-08
Payer: COMMERCIAL

## 2022-08-08 ENCOUNTER — HOSPITAL ENCOUNTER (OUTPATIENT)
Dept: MRI IMAGING | Age: 52
Discharge: HOME | End: 2022-08-08
Payer: COMMERCIAL

## 2022-08-08 DIAGNOSIS — C20 RECTAL CANCER (HCC): ICD-10-CM

## 2022-08-08 PROCEDURE — A9576 INJ PROHANCE MULTIPACK: HCPCS | Performed by: SURGERY

## 2022-08-08 PROCEDURE — 72197 MRI PELVIS W/O & W/DYE: CPT

## 2022-08-08 PROCEDURE — 6360000004 HC RX CONTRAST MEDICATION: Performed by: SURGERY

## 2022-08-08 PROCEDURE — 71260 CT THORAX DX C+: CPT

## 2022-08-08 RX ADMIN — GADOTERIDOL 20 ML: 279.3 INJECTION, SOLUTION INTRAVENOUS at 19:27

## 2022-08-08 RX ADMIN — IOPAMIDOL 75 ML: 755 INJECTION, SOLUTION INTRAVENOUS at 18:10

## 2022-08-09 ENCOUNTER — OFFICE VISIT (OUTPATIENT)
Dept: SURGERY | Age: 52
End: 2022-08-09
Payer: COMMERCIAL

## 2022-08-09 ENCOUNTER — PREP FOR PROCEDURE (OUTPATIENT)
Dept: SURGERY | Age: 52
End: 2022-08-09

## 2022-08-09 ENCOUNTER — HOSPITAL ENCOUNTER (OUTPATIENT)
Age: 52
Setting detail: SPECIMEN
Discharge: HOME | End: 2022-08-09

## 2022-08-09 VITALS
SYSTOLIC BLOOD PRESSURE: 126 MMHG | TEMPERATURE: 98.3 F | HEIGHT: 68 IN | WEIGHT: 194 LBS | DIASTOLIC BLOOD PRESSURE: 76 MMHG | BODY MASS INDEX: 29.4 KG/M2 | RESPIRATION RATE: 16 BRPM | OXYGEN SATURATION: 97 %

## 2022-08-09 DIAGNOSIS — C20 RECTAL CANCER (HCC): Primary | ICD-10-CM

## 2022-08-09 PROCEDURE — 99205 OFFICE O/P NEW HI 60 MIN: CPT | Performed by: SURGERY

## 2022-08-09 RX ORDER — ENOXAPARIN SODIUM 100 MG/ML
40 INJECTION SUBCUTANEOUS ONCE
Status: CANCELLED | OUTPATIENT
Start: 2022-08-09 | End: 2022-08-09

## 2022-08-09 RX ORDER — SODIUM CHLORIDE 0.9 % (FLUSH) 0.9 %
5-40 SYRINGE (ML) INJECTION EVERY 12 HOURS SCHEDULED
Status: CANCELLED | OUTPATIENT
Start: 2022-08-09

## 2022-08-09 RX ORDER — SODIUM CHLORIDE 0.9 % (FLUSH) 0.9 %
5-40 SYRINGE (ML) INJECTION PRN
Status: CANCELLED | OUTPATIENT
Start: 2022-08-09

## 2022-08-09 RX ORDER — SODIUM CHLORIDE 9 MG/ML
INJECTION, SOLUTION INTRAVENOUS PRN
Status: CANCELLED | OUTPATIENT
Start: 2022-08-09

## 2022-08-09 RX ORDER — ACETAMINOPHEN 325 MG/1
1000 TABLET ORAL ONCE
Status: CANCELLED | OUTPATIENT
Start: 2022-08-09 | End: 2022-08-09

## 2022-08-09 NOTE — Clinical Note
Tumor board discussion this Friday. Likely upfront resection - robotic LAR in next few wks. Thanks!  Criselda Triplett

## 2022-08-09 NOTE — PATIENT INSTRUCTIONS
RECTAL CANCER Patient information:          The rectum is the last 6 inches of the large intestine (colon). Rectal cancer arises from the lining of the rectum. In one year, more than 40,000 people in the [place] will be diagnosed with colorectal cancer, making it the third most common cancer in both men and women. About 5% of Americans will develop colorectal cancer during their lifetimes. Colorectal cancer is highly curable if detected in the early stages. WHO IS AT RISK FOR RECTAL CANCER? No one knows the exact causes of rectal cancer. Rectal cancer is more likely to occur as people get older, and more than 90% of people with this disease are diagnosed after age 48. Other risk factors include a family history of colorectal cancer (especially in close relatives), and a personal history of inflammatory bowel disease such as ulcerative colitis, colorectal polyps or cancers of other organs. CAN RECTAL CANCER BE PREVENTED? Rectal cancer can be preventable in some cases. Nearly all rectal cancer develops from rectal polyps, which are precancerous growths on the rectal wall. Detection and removal of these polyps by colonoscopy reduces the risk of getting rectal cancer. Your doctor can provide exact recommendations for rectal cancer screening based on your medical and family history. Screening typically starts at age 48 in patients with average risk, or at younger ages in patients at higher risk for rectal cancer.  Americans should start screening at age 39. Though not definitely proven, there is some evidence that diet may play a significant role in preventing colorectal cancer. As far as we know, a diet high in fiber (whole grains, fruits, vegetables, nuts) and low in fat is the only dietary measure that might help prevent colorectal cancer      WHAT ARE THE SYMPTOMS OF RECTAL CANCER? Many rectal cancers cause no symptoms at all and are detected during routine screening examinations. The most common symptoms of rectal cancer are a change in bowel habits, such as constipation or diarrhea, narrow shaped stools, or blood in your stool. You may also have pelvic or lower abdominal pain, unexplained weight loss, or feel tired all the time. Other common health problems can cause the same symptoms. Hemorrhoids do not cause rectal cancer but can produce similar symptoms. Anyone with these symptoms should see a doctor to be diagnosed and treated as early as possible. Abdominal pain and weight loss are typically late symptoms, indicating possible extensive disease. WHAT TESTS ARE PERFORMED TO DIAGNOSE RECTAL CANCER? Physical exam and medical history  Digital rectal exam (WILMER)  Proctoscopy: An office based exam of the rectum using a proctoscope, inserted into the rectum. Colonoscopy: A procedure to look inside the rectum and colon for polyps (small pieces of bulging tissue), abnormal areas, or cancer. Biopsy: The removal of cells or tissues so they can be viewed under a microscope to check for signs of cancer. WHAT DETERMINES THE PROGNOSIS (OUTCOME) FOR RECTAL CANCER? The stage of the cancer (how far advanced the cancer is). Where the cancer is found in the rectum. Whether the bowel is blocked or has a hole in it. Whether all of the tumor can be removed by surgery. The patients general health and ability to tolerate different treatment regimens. Whether the cancer has just been diagnosed or has recurred (come back). HOW IS RECTAL CANCER STAGED? Distant Staging:  CT scan can accurately detect the presence of most cancer cells that have spread outside of the rectum. PET scan is occasionally used to detect spread of cancer  CEA assay (blood test)    Local Staging:  MRI is one of the tests used for local staging. This will help determine if the tumor has spread through the wall of the rectum and if it has invaded nearby structures.   Endoscopic ultrasound (EUS): A procedure in which an endoscope or rigid probe is inserted into the body through the rectum. HOW IS RECTAL CANCER TREATED? For complete cure, surgery to remove the rectal cancer is almost always required. Depending on the location and stage, this may be performed through the anus (opening of the rectum) or through the abdomen, or sometimes both. Rectal cancer surgery removes the cancer and lymph nodes, along with a small portion of normal rectum on either side of the tumor. Creation of a colostomy (opening the intestine to a bag on the skin) may be needed in some patients. Trained surgeons may use minimally invasive surgical techniques depending on certain features of your cancer, such as laparoscopic and robotic surgery. Your surgeon will discuss these features with you prior to the operation. Additional treatment with chemotherapy or radiation therapy may be offered either before or after the surgery, depending on the stage and location of the cancer. RISKS OF COLON AND RECTAL SURGERY    Colon and rectal surgery is major surgery, and has risks. Even in healthy patients, complications can happen. Some of these risks can include (but are not limited to): bleeding, wound infections, deeper infections, hernias (especially with open surgery), scar tissue, missed lesions, unexpected findings, need to go back to the operating room or have another procedure, need for temporary or permanent stoma (see above), damage to other structures (such as intestine, blood vessels, other organs, and nerves), blood clots, pneumonia, heart attack, kidney failure, urinary infections, anastomotic leak, and even death. Anastomotic leak occurs if the bowel reconnection does not heal properly. Even in healthy patient with no medical problems, this can occur. Symptoms can include rectal bleeding, fevers, bloating, change in appetite, change in heart rate and generally not feeling well.  Sometimes it can be difficult to diagnose a leak given the big overall with normal recovery and other problems that occur with surgery. Leaks occur most commonly within the first 1-2 weeks after surgery, but can be found much later as well. If a leak does happen, there is a wide range of possible effects, depending on the severity. With small/minor leaks, the leak may seal itself off, and some patients may not notice any changes. Some may have low grade fevers or change in appetite. In some leaks, a fluid collection (abscess) may develop that can typically be treated with antibiotics and sometimes a drainage tube placed by a radiologist. Fluid collections can sometimes heal spontaneously, or sometimes develop into a long term fistula, or abnormal connection to the skin, or another organ. Occasionally these can require a second surgery if it does resolve over a reasonable amount of time (typically 3-6 months). Another long term problem with an imperfect healing anastomosis is a stricture, or over-tightening of the colon, that can cause difficulties eliminating stool. In patients with a substantial or large leak, this can be quite dangerous, and can cause sepsis. Emergency reoperation may be necessary to control the stool spillage in this situation. This last scenario is especially worrisome for need for a stoma, long term issues with bowel function, prolonged recovery, or even death. Many of these complications are increased in those with risk factors such as: current and previous smokers, poorly controlled diabetics, alcohol drinkers, patients with previous abdominal surgery, patients with history of chemotherapy or radiation, those who take steroids or blood thinners, patients with increased disposition to heart, lung, or kidney problems and those who are obese. You will be under general anesthesia (completely asleep) during the operation.  There are risks of anesthesia, especially with longer operations, that will be discussed in greater detail with you by the anesthesiologist on the day of surgery. Some of these risks include changes in breathing or circulation, allergic reactions to medications, need to be on the ventilator after surgery. Please reach out to your surgeon if you have any questions about these risks and complications. WHAT FACTORS INFLUENCE PROGNOSIS (OUTCOME)? The outcome of patients with rectal cancer is most clearly related to the stage at the time of diagnosis, with cancer that is confined to the lining of colon having the best chance of success. This is one reason why early detection through screening methods like colonoscopy is crucial.    WHAT FOLLOW-UP IS NEEDED AFTER TREATMENT? After treatment for rectal cancer, a blood test to measure amounts of CEA (a substance in the blood that may be increased when cancer is present) may be done to see if the cancer has come back. Routine CT scans, clinical examinations, and colonoscopy are also performed at intervals determined by the stage. WHAT IS A COLON AND RECTAL SURGEON? Colon and rectal surgeons are experts in the surgical and non-surgical treatment of diseases of the colon, rectum and anus. They have completed advanced surgical training in the treatment of these diseases as well as full general surgical training. Board-certified colon and rectal surgeons complete residencies in general surgery and colon and rectal surgery, and pass intensive examinations conducted by the American Board of Surgery and the American Board of Colon and Rectal Surgery. They are well-versed in the treatment of both benign and malignant diseases of the colon, rectum and anus and are able to perform routine screening examinations and surgically treat conditions if indicated to do so.

## 2022-08-09 NOTE — LETTER
[facility] [address] [phone]  f [phone]    Jessenia Simmons MD                        SURGERY ORDER   -- Time of order -- 22    3:26 PM    Facility:   [facility] # _________________                                                                                    Scheduled By:____________                  Surgery Date & Time: 22 at 1:00pm                                      Pt arrival: 11:00AM                                                                                      Patient Name:  Ari Ortega     :  1970     PCP:  Belgica Arthur. Areila Booth., DO      Home Ph:    [phone] (home)                                                     PROCEDURE: Robotic low anterior resection with colorectal anastomosis 80761    DIAGNOSIS: Mid rectal cancer C20    Anesthesia: _General  + exparel TAP block  Time Needed:  4 hours    Pt Position:  lithotomy    Ureteral Stents: none  Ostomy Marking: none          Admit _x__                  Pre-Op clearance to be done by: _PCP____    Cardiac Clearance Done by: __N/A______    Medications to be stopped 7 days before surgery: Mireya Auguste MD  Insurance:                                ID #                                        Ph #     (secondary ?)       Date called ______        Melina Fire to: _____ @ _____           Precert Needed?  Yes  /  No    PreAuth # & Details _______________________________________________________                        ______ Norma Mccarthy to Jones 2 Verification _[phone]_      Post Op_________              ____Inst given                 _____ Melina Fire to Pt/Spouse                          COLON SURGERY CHECKLIST    -- Pre-op clearance: PCP  -- Surgery order faxed, date/time obtained, placed on calendar  -- Prep and oral antibiotics (#2) ordered, information given to patient  -- Phone call day before procedure to confirm  -- Post op appt: 2 weeks  -- Other: surgery in next 2-3 wks

## 2022-08-09 NOTE — PROGRESS NOTES
[facility] COLORECTAL SURGERY  [address]  Dept: [phone]  Dept Fax: [phone]  Loc: [phone]    Visit Date: 8/9/2022    Gardenia Mcgrath is a 46 y.o. female who presents today for: New Patient (Referral from Dr. Omaira Lizama and Dr. Melecio Rodriguez for rectal cancer, colonoscopy with Dr. Stephen Hansen July 19 2022, occasional blood in stool )      HPI:       Gardenia Mcgrath is a 46 y.o. female referred to me by Dr. Omaira Lizama and Dr. Melecio Rodriguez for further evaluation regarding new diagnosis of mid rectal cancer. Hugo Romero was actually scheduled for a screening routine colonoscopy with Dr Stephen Hansen, but actually started having bleeding earlier that same week. She underwent colonoscopy and was found to have 2 small tubular adenomatous polyps, as well as a rectal ulcer, with final pathology showing adenocarcinoma. She then underwent endorectal ultrasound which showed a T2N0 lesion. She has now undergone CT scans of the chest, abdomen, pelvis, as well as MRI pelvis for complete staging work-up. 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 She does have a history of cigarette smoking in the remote past, but not in the last 30 years. History of appendectomy in the 80s and abdominoplasty. Patient's problem list, medications, past medical, surgical, family, and social histories were reviewed and updated in the chart as indicated today.     Past Medical History:   Diagnosis Date    Cornea abrasion 2020    cornea tear     Migraines     uses imitrex as needed       Past Surgical History:   Procedure Laterality Date    ABDOMINOPLASTY      APPENDECTOMY  1983    ARM SURGERY N/A 5/18/2020    EXCISION SCALP CYST x TWO, EXCISION SKIN LESION LEFT LEG performed by Alejo Patterson MD at [address] [place] [address]  12-5-2012    AMBER BREAST REDUCTION; ABDOMINOPLASTY COLONOSCOPY N/A 2022    COLONOSCOPY WITH BIOPSY performed by Morris Cruz DO at [facility] 72 N/A 2022    COLONOSCOPY POLYPECTOMY SNARE/COLD BIOPSY performed by Morris Cruz DO at [facility] 72  2022    COLONOSCOPY SUBMUCOSAL/INK TATTOO  INJECTION performed by Morris Cruz DO at [facility] 49 CYST x TWO, EXCISION SKIN LESION LEFT LEG     DENTAL SURGERY      teeth extracted    ENDOMETRIAL ABLATION      SIGMOIDOSCOPY N/A 2022    ENDOSCOPIC ULTRASOUND OF RECTUM performed by Sheree Loaiza MD at [facility]       Cancer-related family history includes Cancer in her brother, maternal aunt, and paternal aunt. MGM in [de-identified] with colon CA, Maternal first cousin  at age 64    Social History:   Social History     Tobacco Use    Smoking status: Former     Types: Cigarettes     Start date: 1988     Quit date: 1995     Years since quittin.6    Smokeless tobacco: Never   Substance Use Topics    Alcohol use: Not Currently     Comment: occasionally      Tobacco cessation counseling provided as appropriate. REVIEW OF SYSTEMS:    Pertinent positives and negatives are mentioned in the HPI above. Otherwise, all other systems were reviewed and negative. Objective:     Physical Exam   /76   Temp 98.3 °F (36.8 °C) (Infrared)   Resp 16   Ht 5' 8\" (1.727 m)   Wt 194 lb (88 kg)   SpO2 97%   BMI 29.50 kg/m²   Constitutional: Appears well-developed and well-nourished. Grooming appropriate. No gross deformities. Body mass index is 29.5 kg/m². Eyes: No scleral icterus. Conjunctiva/lids normal. Vision intact grossly. Pupils equal/symmetric, reactive bilaterally. ENT: External ears/nose without defect, scars, or masses. Hearing grossly intact. No facial deformity. Lips normal, normal dentition. Neck: No masses. Trachea midline. No crepitus. Thyroid not enlarged.   Cardiovascular: Normal rate. No peripheral edema. Abdominal aorta normal size to palpation. Pulmonary/Chest: Effort normal. No respiratory distress. No wheezes. No use of accessory muscles. Musculoskeletal: Normal range of motion x all 4 extremities and head/neck, without deformity, pain, or crepitus, with normal strength and tone. Normal gait. Nails without clubbing or cyanosis. Neurological: Alert and oriented to person, place, and time. No gross deficits. Sensation intact. Skin: Skin is dry. No rashes noted. No pallor. No induration of nodules. Psychiatric: Normal mood and affect. Behavior normal. Oriented to person, place, and time. Judgment and insight reasonable. Abdominal/wound: Soft, nontender, nondistended    Labs reviewed: CEA reviewed  Radiology reviewed: CT chest, abdomen, pelvis reviewed and personally interpreted, no evidence of metastatic disease    MRI reviewed and personally interpreted, no large lymphadenopathy, relatively shallow small lesion; Last colonoscopy: Haroldo Laws -reviewed endorectal ultrasound and discussed with performing provider. Coordination of care with GI and oncology. Plan for tumor board discussion this Friday      Assessment/Plan:     A/P:  New problem(s) with uncertain prognosis: Mid rectal cancer  Established problem(s): None  Additional workup/treatment planned: Tumor board discussion, strongly considering robotic low anterior resection upfront versus neoadjuvant chemoradiation before surgery  Risk of complications/morbidity: High    I had a long discussion with Tegan today in the office. We discussed the pathophysiology, etiology, work-up, treatment options, natural history, staging regarding rectal cancer. I discussed with her the complexity of her situation specifically, as her endorectal ultrasound shows a T2 N0 lesion, while her MRI shows a T3a N0 lesion.   Regardless, no evidence of metastatic disease on any imaging studies thus far and CEA is normal.  I did discuss that a T2 N0 would be considered stage I and upfront resection would be the standard of care. In the case of a T3 lesion however, stage II, we would recommend neoadjuvant chemotherapy followed by chemoradiation, followed by resection, which would typically involve colorectal anastomosis with diverting temporary loop ileostomy, followed by ileostomy reversal.  Obviously the latter situation can be a very extended time period. However with upfront resection, there is always the chance that the endorectal ultrasound may be under staging and final pathology may reveal either T3 or N1 disease, in which adjuvant chemotherapy would be recommended. We would typically not do adjuvant radiation under most normal circumstances. 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 I will plan to discuss her at tumor board this coming Friday. At this point I am leaning towards resection as she is as well. I will discussed the case further as well with her oncologist and GI physicians. If we do proceed with surgery, I discussed the plan for robotic assisted laparoscopic low anterior section with colorectal anastomosis in the coming weeks. I had a discussion with the patient regarding the risks, benefits, and alternatives of the procedure, including, but not limited to: bleeding, infection, anastomotic leak, poor wound healing or cosmetic result, hernia, bowel obstruction, fistula, need for reoperation or additional procedures, temporary or permanent ostomy, damage to other structures, such as bowel, bladder, ureter, stomach, liver, and neurovascular structures. Need for, and risks of general anesthesia discussed. Postoperative typical bowel function, urinary function, and sexual function were discussed. The likelihood of open operation was discussed as well.  Prep, preoperative testing, typical hospital stay, and perioperative expectations were addressed, as well as typical expectations regarding pain control and time away from work and daily activities. All questions were answered to patient's satisfaction. They understand that even in technically successfully operations and even in healthy patients, complications can occur, including possibility of death. I provided and encouraged patients and family members to review AVS (after visit summary) information that I have provided, that contains even more information regarding surgical risks and complications. They were encouraged to reach out to my office if they have any questions before or after surgery. Patient understands higher risk of perioperative morbidity and mortality given: previous abd surgery     Continue with current medications    I provided written information in the After Visit Summary AVS Regarding: rectal cancer    DISPOSITION:  tumor board discussion, possibly surgical planning    My findings will be relayed to consulting practitioner or PCP via Epic    Note completed using dictation software, please excuse any errors.     Electronically signed by Eyal Almonte MD on 8/9/2022 at 2:10 PM

## 2022-08-12 ENCOUNTER — CLINICAL DOCUMENTATION (OUTPATIENT)
Dept: SURGERY | Age: 52
End: 2022-08-12

## 2022-08-12 ENCOUNTER — OFFICE VISIT (OUTPATIENT)
Dept: PRIMARY CARE CLINIC | Age: 52
End: 2022-08-12
Payer: COMMERCIAL

## 2022-08-12 ENCOUNTER — TELEPHONE (OUTPATIENT)
Dept: SURGERY | Age: 52
End: 2022-08-12

## 2022-08-12 VITALS
WEIGHT: 195 LBS | HEART RATE: 96 BPM | HEIGHT: 68 IN | RESPIRATION RATE: 16 BRPM | TEMPERATURE: 97.3 F | OXYGEN SATURATION: 99 % | DIASTOLIC BLOOD PRESSURE: 76 MMHG | BODY MASS INDEX: 29.55 KG/M2 | SYSTOLIC BLOOD PRESSURE: 118 MMHG

## 2022-08-12 DIAGNOSIS — Z01.818 PRE-OPERATIVE CLEARANCE: ICD-10-CM

## 2022-08-12 DIAGNOSIS — C20 PRIMARY MALIGNANT NEOPLASM OF RECTUM (HCC): Primary | ICD-10-CM

## 2022-08-12 DIAGNOSIS — L30.0 NUMMULAR ECZEMA: ICD-10-CM

## 2022-08-12 PROBLEM — C80.1 CANCER (HCC): Status: ACTIVE | Noted: 2022-07-19

## 2022-08-12 PROBLEM — C80.1 CANCER (HCC): Status: RESOLVED | Noted: 2022-07-19 | Resolved: 2022-08-12

## 2022-08-12 PROCEDURE — 99214 OFFICE O/P EST MOD 30 MIN: CPT | Performed by: FAMILY MEDICINE

## 2022-08-12 SDOH — ECONOMIC STABILITY: TRANSPORTATION INSECURITY
IN THE PAST 12 MONTHS, HAS THE LACK OF TRANSPORTATION KEPT YOU FROM MEDICAL APPOINTMENTS OR FROM GETTING MEDICATIONS?: NO

## 2022-08-12 ASSESSMENT — PATIENT HEALTH QUESTIONNAIRE - PHQ9
SUM OF ALL RESPONSES TO PHQ QUESTIONS 1-9: 0
SUM OF ALL RESPONSES TO PHQ QUESTIONS 1-9: 0
2. FEELING DOWN, DEPRESSED OR HOPELESS: 0
1. LITTLE INTEREST OR PLEASURE IN DOING THINGS: 0
SUM OF ALL RESPONSES TO PHQ QUESTIONS 1-9: 0
SUM OF ALL RESPONSES TO PHQ QUESTIONS 1-9: 0
SUM OF ALL RESPONSES TO PHQ9 QUESTIONS 1 & 2: 0

## 2022-08-12 NOTE — TELEPHONE ENCOUNTER
Patient called to find out when and where her surgery will be. She needs to make arrangements at work. She was originally told 8/19/22 at [facility]. Then told Dr Sahil Lennon can not do that type of surgery there and it would need to be at [facility]. We were trying to find time at [facility] on 8/19/22 to do the surgery.     Please call: [phone]

## 2022-08-12 NOTE — PROGRESS NOTES
Preoperative Consultation        [address] ,  [place]artha Gayle, [address] [facility] [place] [address]         Phone: [phone]  Jaqui Mar    YOB: 1970    Date of Service:  8/12/2022    Chief Complaint   Patient presents with    Pre-op Exam     Colon resection at [facility] by Dr. Lourdes Rodriguez next week TBD    Colon Cancer    Rectal Cancer     HPI:  Jaqui Mar is 45 y/o female who presents to the office today for a preoperative consultation at the request of surgeon, Dr. Ritesh Khan, who plans on performing partial colectomy and mass excision with lymph node dissection with reanastomosis tentatively on Friday 8/26/2022 at [facility].  Started with hematochezia and was going for screening colonoscopy by Dr. Lizbeth Khan, then Dr. Lashae Alfaro performed Endoscopic Ultrasound. Planned anesthesia: General   Known anesthesia problems: Past general anesthesia with complications- post operative nausea   Bleeding risk: No recent or remote history of abnormal bleeding  Personal or FH of DVT/PE: No    Patient objection to receiving blood products: No      Rash:  2° complaint today of right palmar wrist rash  Had in past 10/5/2021 and seen virtually by our office  Rx steroid ointment  Took long time to resolve per patient  Has recurred and inquires about further today  No swelling or pain, just itching and dry skin/mild erythema on palm of hand and wrist palmar surface as pictured below. Patient Active Problem List   Diagnosis    Rotator cuff tendinitis    Left shoulder pain    Migraines    Obesity (BMI 30-39. 9)    Low back pain    Rosacea    Chronic thumb pain, left    Scalp cyst    Neoplasm of uncertain behavior of skin       Past Medical History:   Diagnosis Date    colorectal 07/19/2022    Cornea abrasion 2020    cornea tear     Migraines     uses imitrex as needed     Past Surgical History:   Procedure Laterality Date    ABDOMINOPLASTY      APPENDECTOMY  1983    ARM SURGERY N/A 2020    EXCISION SCALP CYST x TWO, EXCISION SKIN LESION LEFT LEG performed by Marita Plata MD at [address] [place] [address]  2012    AMBER BREAST REDUCTION; ABDOMINOPLASTY    COLONOSCOPY N/A 2022    COLONOSCOPY WITH BIOPSY performed by Peter Flowers DO at [facility] N/A 2022    COLONOSCOPY POLYPECTOMY SNARE/COLD BIOPSY performed by Peter Flowers DO at [facility]  2022    COLONOSCOPY SUBMUCOSAL/INK TATTOO  INJECTION performed by Peter Flowers DO at [facility] x TWO, EXCISION SKIN LESION LEFT LEG     DENTAL SURGERY      teeth extracted    ENDOMETRIAL ABLATION      SIGMOIDOSCOPY N/A 2022    ENDOSCOPIC ULTRASOUND OF RECTUM performed by Chapo Shea MD at [place] [address]     . No current outpatient medications on file. No current facility-administered medications for this visit.      Allergies   Allergen Reactions    Oxycodone-Acetaminophen Itching       Family History   Problem Relation Age of Onset    Heart Disease Mother         4 stents, aortic valve replacement     Dementia Mother     Heart Disease Father     Cancer Maternal Aunt     Cancer Paternal Aunt     Cancer Brother         esophageal and stomach CA x 6 years, multiple myeloma     Social History     Socioeconomic History    Marital status:      Spouse name: Nisha Guthrie    Number of children: 2    Years of education: 16    Highest education level: Associate degree: occupational, technical, or vocational program   Occupational History    Not on file   Tobacco Use    Smoking status: Former     Types: Cigarettes     Start date: 1988     Quit date: 1995     Years since quittin.6    Smokeless tobacco: Never   Vaping Use    Vaping Use: Never used   Substance and Sexual Activity    Alcohol use: Not Currently     Comment: occasionally    Drug use: No    Sexual activity: Yes     Partners: Male   Other Topics Concern    Not on file   Social History Narrative    Has 2 daughters        24y/o Tres Greene and 31 y/o [facility] daughters; both graduated college and work     Social Determinants of Health     Financial Resource Strain: Low Risk     Difficulty of Paying Living Expenses: Not hard at all   Food Insecurity: No Food Insecurity    Worried About [address] in the Last Year: Never true    [address] in the Last Year: Never true   Transportation Needs: No Transportation Needs    Lack of Transportation (Medical): No    Lack of Transportation (Non-Medical):  No   Physical Activity: Not on file   Stress: Not on file   Social Connections: Not on file   Intimate Partner Violence: Not on file   Housing Stability: Not on file       Review of Systems  Constitutional:  Negative for activity or appetite change, fever or fatigue  HENT:  Negative for congestion, sinus pressure, or rhinorrhea  Eyes:  Negative for eye pain or visual changes  Resp:  Negative for SOB, chest tightness, cough  Cardiovascular: Negative for CP, palpitations, GUNDERSON, orthopnea, PND, LE edema  Gastrointestinal: Negative for abd pain, melena, BRBPR, negative for diarrhea, or vomiting  Positive for nausea    Endocrine:  Negative for polydipsia and polyuria  :  Negative for dysuria, flank pain or urinary frequency  Musculoskeletal:  Negative for back pain or myalgias  Neuro:  Negative for dizziness or lightheadedness  Psych: negative for depression or anxiety  Integumentary: positive for rash on palmar surface of right wrist and hand     Physical Exam:  Vitals:    08/12/22 1300   BP: 118/76   Site: Left Upper Arm   Position: Sitting   Cuff Size: Large Adult   Pulse: 96   Resp: 16   Temp: 97.3 °F (36.3 °C)   TempSrc: Infrared   SpO2: 99%   Weight: 195 lb (88.5 kg)   Height: 5' 8\" (1.727 m)       Constitutional: She is oriented to person, place, and time. She appears well-developed and well-nourished. No distress. HENT:   Head: Normocephalic and atraumatic. Mouth/Throat: Uvula is midline, oropharynx is clear and moist and mucous membranes are normal.   Eyes: Conjunctivae and EOM are normal. Pupils are equal, round, and reactive to light. Neck: Trachea normal and normal range of motion. Neck supple. No JVD present. Carotid bruit is not present. No mass and no thyromegaly present. Cardiovascular: Normal rate, regular rhythm, normal heart sounds and intact distal pulses. Exam reveals no gallop and no friction rub. No murmur heard. Pulmonary/Chest: Effort normal and breath sounds normal. No respiratory distress. She has no wheezes. She has no rales. Abdominal: Soft. Normal aorta and bowel sounds are normal. She exhibits no distension and no mass. There is no hepatosplenomegaly. No tenderness. Musculoskeletal: She exhibits no edema and no tenderness. Neurological: She is alert and oriented to person, place, and time. She has normal strength. No cranial nerve deficit or sensory deficit. Coordination and gait normal.   Skin: Skin is warm and dry. Skin peeling/cracking/drying of palm/palmar aspect of right wrist as below with mild erythema. Psychiatric: She has a normal mood and affect.  Her behavior is normal.         EKG Interpretation: not indicated based on Revised Cardiac Risk Index of 0    Lab Review:  [facility] labs reviewed, CMP and CBC within normal limitis    Hospital Outpatient Visit on 04/18/2022   Component Date Value    Cholesterol, Total 04/18/2022 277 (A)    Triglycerides 04/18/2022 177 (A)    HDL 04/18/2022 51     LDL Calculated 04/18/2022 191 (A)    VLDL Cholesterol Calcula* 04/18/2022 35     Hemoglobin A1C 04/18/2022 5.3     eAG 04/18/2022 105.4     HIV Ag/Ab 04/18/2022 Non-Reactive     HIV-1 Antibody 04/18/2022 Non-Reactive     HIV ANTIGEN 04/18/2022 Non-Reactive     HIV-2 Ab 04/18/2022 Non-Reactive     Hep C Ab Interp 04/18/2022 Non-reactive          Assessment/Plan:    Harshad Lobato is 45 y/o female here for preoperative clearance exam at request of  Dr. Kael Stuart who plans to perform tumor excision via partial colectomy robotically with reanastomosis for diagnosis of colorectal cancer found based on C scope, endoscopic ultrasound, biopsy, and CT/MRI. Planned procedure with tentatively date of Friday 8/26/2022 at [facility]. Okay for planned procedure. 1. Primary malignant neoplasm of rectum ([facility] [address]  2. Pre-operative clearance  RCRI risk score 0  Functional Capacity:  > 10 METS  Preoperative workup as follows: none  Change current medications as follows: ----None  Prophylaxis for cardiac events with   A)  perioperative beta-blockers: Not indicated  B)  perioperative statins: not indicated    Deep vein thrombosis prophylaxis: regimen to be chosen by surgical team  Known risk factors for perioperative complications: None  As outlined above, measures were taken to assess risk, and decrease risk when possible. Risks of surgery were discussed with patient, and benefits believed to outweigh risks. Patient is making an informed decision to proceed with surgery with an understanding of any risk,  Please follow all pre-op recommendations above. 3. Nummular eczema  -unclear but appears to be atopic dermatitis  -liberal emollient, had medium potency steroid ointment 10 months ago, would consider higher potency steroid ointment +/- phototherapy, consider dermatoscope/dermatology referral or shave/punch biopsy    Spent 30-39 minutes of face to face interaction with patient counseling on diagnoses and treatment plan; including but not limited to pre visit planning, chart/lab review, new orders, instructions, charting    Return in about 6 weeks (around 9/23/2022) for Dr. Shankar Rivera for establish and rash. Magali Chauhan.  Mouna Dubon.,

## 2022-08-12 NOTE — PROGRESS NOTES
Ari Ortega  [id number]  1970    Rectal Cancer Tumor Board Pretreatment Summary:  Presented on 08/12/22    - Tumor location: middle - 9 cm from anal verge  - Sphincter involvement: no  - Clinical Stage: [id number]; MRI - T3aN0  - Pretreatment circumferential margin status: not threatened  - CEA: 1.53 (7/29/22)  - Path reviewed by MDT member: yes Cristobal Lewis  - MRI reviewed by MDT member: ileana Villatoro  - Neoadjuvant treatment recommendation: potentially  - Type and duration of neoadjuvant therapy recommended: FOLFOX x 4-8 cycles vs TONNY  - Anticipated date and type of surgical procedure: Depends - consider upfront resection (Robotic LAR soon) vs neoadjuvant chemo or chemo/rads followed by robotic LAR/DI 6-10 wks after completion  - Clinical research study eligibility and/or enrollment: no    Other:  - Recommend genetics eval given age  -Given discordance between endorectal ultrasound showing uT2N0, and MRI showing T3N0, will need to discuss with patient various options; upfront resection reasonable with the possibility of avoiding diverting ileostomy and radiation and overtreating; or can proceed with TONNY with upfront chemotherapy, then chemoradiation, then surgery. Could also consider PROSPECT trial - given chemo, then restage and plan surgery without radiation.

## 2022-08-12 NOTE — PATIENT INSTRUCTIONS
Okay from my standpoint for the procedure  Avoid anti-inflammatories up until surgery    Consider statin medication in future due to total and LDL cholesterol, however work on lifestyle modification    See Dr. Filemon Mahan in 6 weeks

## 2022-08-12 NOTE — Clinical Note
ROMULO baires for procedure; let me know if date changes for some reason; thanks for all information on patient recent dx and tx

## 2022-08-14 PROBLEM — L30.0 NUMMULAR ECZEMA: Status: ACTIVE | Noted: 2022-08-14

## 2022-08-15 DIAGNOSIS — C20 RECTAL CANCER (HCC): Primary | ICD-10-CM

## 2022-08-15 RX ORDER — NEOMYCIN SULFATE 500 MG/1
TABLET ORAL
Qty: 6 TABLET | Refills: 0 | Status: ON HOLD | OUTPATIENT
Start: 2022-08-15 | End: 2022-08-27 | Stop reason: HOSPADM

## 2022-08-15 RX ORDER — ONDANSETRON 4 MG/1
4 TABLET, ORALLY DISINTEGRATING ORAL EVERY 8 HOURS PRN
Qty: 3 TABLET | Refills: 0 | Status: SHIPPED | OUTPATIENT
Start: 2022-08-15 | End: 2022-09-15 | Stop reason: ALTCHOICE

## 2022-08-15 RX ORDER — METRONIDAZOLE 500 MG/1
TABLET ORAL
Qty: 3 TABLET | Refills: 0 | Status: ON HOLD | OUTPATIENT
Start: 2022-08-15 | End: 2022-08-27 | Stop reason: HOSPADM

## 2022-08-17 NOTE — PROGRESS NOTES
Place patient label inside box (if no patient label, complete below)  Name:  :  MR#:     India Goodwin / PROCEDURE  I (we)MYNOR MELISSA  authorize DR Claudetta Shilling and/or such assistants as may be selected by him/her, to perform the following operation/procedure(s): ROBOTIC LOW ANTERIOR RESECTION WITH COLORECTAL ANASTAMOSIS       Note: If unable to obtain consent prior to an emergent procedure, document the emergent reason in the medical record. This procedure has been explained to my (our) satisfaction and included in the explanation was: The intended benefit, nature, and extent of the procedure to be performed; The significant risks involved and the probability of success; Alternative procedures and methods of treatment; The dangers and probable consequences of such alternatives (including no procedure or treatment); The expected consequences of the procedure on my future health; Whether other qualified individuals would be performing important surgical tasks and/or whether  would be present to advise or support the procedure. I (we) understand that there are other risks of infection and other serious complications in the pre-operative/procedural and postoperative/procedural stages of my (our) care. I (we) have asked all of the questions which I (we) thought were important in deciding whether or not to undergo treatment or diagnosis. These questions have been answered to my (our) satisfaction. I (we) understand that no assurance can be given that the procedure will be a success, and no guarantee or warranty of success has been given to me (us). It has been explained to me (us) that during the course of the operation/procedure, unforeseen conditions may be revealed that necessitate extension of the original procedure(s) or different procedure(s) than those set forth in Paragraph 1.  I (we) authorize and request that the above-named physician, his/her assistants or his/her designees, perform procedures as necessary and desirable if deemed to be in my (our) best interest.     Revised 8/2/2021                                                                          Page 1 of 2           I acknowledge that health care personnel may be observing this procedure for the purpose of medical education or other specified purposes as may be necessary as requested and/or approved by my (our) physician. I (we) consent to the disposal by the hospital Pathologist of the removed tissue, parts or organs in accordance with hospital policy. I do ____ do not ____ consent to the use of a local infiltration pain blocking agent that will be used by my provider/surgical provider to help alleviate pain during my procedure. I do ____ do not ____ consent to an emergent blood transfusion in the case of a life-threatening situation that requires blood components to be administered. This consent is valid for 24 hours from the beginning of the procedure. This patient does ____ or does not ____ currently have a DNR status/order. If DNR order is in place, obtain Addendum to the Surgical Consent for ALL Patients with a DNR Order to address zee-operative status for limited intervention or DNR suspension.      I have read and fully understand the above Consent for Operation/Procedure and that all blanks were completed before I signed the consent.   _____________________________       _____________________      ____/____am/pm  Signature of Patient or legal representative      Printed Name / Relationship            Date / Time   ____________________________       _____________________      ____/____am/pm  Witness to Signature                                    Printed Name                    Date / Time    If patient is unable to sign or is a minor, complete the following)  Patient is a minor, ____ years of age, or unable to sign because: ______________________________________________________________________________________________    If a phone consent is obtained, consent will be documented by using two health care professionals, each affirming that the consenting party has no questions and gives consent for the procedure discussed with the physician/provider.   _____________________          ____________________       _____/_____am/pm   2nd witness to phone consent        Printed name           Date / Time    Informed Consent:  I have provided the explanation described above in section 1 to the patient and/or legal representative.  I have provided the patient and/or legal representative with an opportunity to ask any questions about the proposed operation/procedure.   ___________________________          ____________________         ____/____am/pm  Provider / Proceduralist                            Printed name            Date / Time  Revised 8/2/2021                                                                      Page 2 of 2

## 2022-08-17 NOTE — PROGRESS NOTES
[facility] PRE-SURGICAL TESTING INSTRUCTIONS                      PRIOR TO PROCEDURE DATE:    1. PLEASE FOLLOW ANY INSTRUCTIONS GIVEN TO YOU PER YOUR SURGEON. 2. Arrange for someone to drive you home and be with you for the first 24 hours after discharge for your safety after your procedure for which you received sedation. Ensure it is someone we can share information with regarding your discharge. NOTE: At this time ONLY 2 ADULTS may accompany you & everyone must be MASKED. 3. You must contact your surgeon for instructions IF:  You are taking any blood thinners, aspirin, anti-inflammatory or vitamins. There is a change in your physical condition such as a cold, fever, rash, cuts, sores, or any other infection, especially near your surgical site. 4. Do not drink alcohol the day before or day of your procedure. Do not use any recreational marijuana at least 24 hours or street drugs (heroin, cocaine) at minimum 5 days prior to your procedure. 5. A Pre-Surgical History and Physical MUST be completed WITHIN 30 DAYS OR LESS prior to your procedure. by your Physician or an Urgent Care        THE DAY OF YOUR PROCEDURE:  1. Follow instructions for ARRIVAL TIME as DIRECTED BY YOUR SURGEON. 2. Enter the MAIN entrance from [facility] and follow the signs to the free Parking Garage or [employer] (offered free of charge 7 am-5pm). 3. Enter the Main Entrance of the hospital (do not enter from the lower level of the parking garage). Upon entrance, check in with the  at the surgical information desk on your LEFT. Bring your insurance card and photo ID to register      4. DO NOT EAT ANYTHING 8 hours prior to arrival for surgery. You may have up to 8 ounces of water 4 hours prior to your arrival for surgery.    NOTE: ALL Gastric, Bariatric & Bowel surgery patients - you MUST follow your surgeon's instructions regarding eating/ drinking as you will have very specific instructions to follow. If you did not receive these, call your surgeon's office immediately. 5. MEDICATIONS:  Take the following medications with a SMALL sip of water:   Use your usual dose of inhalers the morning of surgery. BRING your rescue inhaler with you to hospital.   Anesthesia does NOT want you to take insulin the morning of surgery. They will control your blood sugar while you are at the hospital. Please contact your ordering physician for instructions regarding your insulin the night before your procedure. If you have an insulin pump, please keep it set on basal rate. Bariatric patient's call your surgeon if on diabetic medications as some may need to be stopped 1 week prior to surgery    6. Do not swallow additional water when brushing teeth. No gum, candy, mints, or ice chips. Refrain from smoking or at least decrease the amount on day of surgery. 7. Morning of surgery:   Take a shower with an antibacterial soap (i.e., Safeguard or Dial) OR your physician may have instructed you to use Hibiclens. Dress in loose, comfortable clothing appropriate for redressing after your procedure. Do not wear jewelry (including body piercings), make-up (especially NO eye make-up), fingernail polish (NO toenail polish if foot/leg surgery), lotion, powders, or metal hairclips. Do not shave or wax for 72 hours prior to procedure near your operative site. Shaving with a razor can irritate your skin and make it easier to develop an infection. On the day of your procedure, any hair that needs to be removed near the surgical site will be 'clipped' by a healthcare worker using a special clipper designed to avoid skin irritation. 8. Dentures, glasses, or contacts will need to be removed before your procedure. Bring cases for your glasses, contacts, dentures, or hearing aids to protect them while you are in surgery. 9. If you use a CPAP, please bring it with you on the day of your procedure.     10. We recommend that valuable personal belongings such as cash, cell phones, e-tablets, or jewelry, be left at home during your stay. The hospital will not be responsible for valuables that are not secured in the hospital safe. However, if your insurance requires a co-pay, you may want to bring a method of payment, i.e., Check or credit card, if you wish to pay your co-pay the day of surgery. 11. If you are to stay overnight, you may bring a bag with personal items. Please have any large items you may need brought in by your family after your arrival to your hospital room. 12. If you have a Living Will or Durable Power of , please bring a copy on the day of your procedure. How we keep you safe and work to prevent surgical site infections:   1. Health care workers should always check your ID bracelet to verify your name and birth date. You will be asked many times to state your name, date of birth, and allergies. 2. Health care workers should always clean their hands with soap or alcohol gel before providing care to you. It is okay to ask anyone if they cleaned their hands before they touch you. 3. You will be actively involved in verifying the type of procedure you are having and ensuring the correct surgical site. This will be confirmed multiple times prior to your procedure. Do NOT elizabeth your surgery site UNLESS instructed to by your surgeon. 4. When you are in the operating room, your surgical site will be cleansed with a special soap, and in most cases, you will be given an antibiotic before the surgery begins. What to expect AFTER your procedure? 1. Immediately following your procedure, your will be taken to the PACU for the first phase of your recovery. Your nurse will help you recover from any potential side effects of anesthesia, such as extreme drowsiness, changes in your vital signs or breathing patterns.  Nausea, headache, muscle aches, or sore throat may also occur after

## 2022-08-24 ENCOUNTER — ANESTHESIA EVENT (OUTPATIENT)
Dept: OPERATING ROOM | Age: 52
DRG: 331 | End: 2022-08-24
Payer: COMMERCIAL

## 2022-08-25 ENCOUNTER — TELEPHONE (OUTPATIENT)
Dept: SURGERY | Age: 52
End: 2022-08-25

## 2022-08-25 NOTE — TELEPHONE ENCOUNTER
I have placed a reminder call to patient for upcoming procedure. Did you speak directly to patient or leave a voicemail? Spoke to patient     Prep? NPO 12AM  Prep #2    Will be admitted.     Arrive at the main entrance [facility] at 11:00AM

## 2022-08-26 ENCOUNTER — HOSPITAL ENCOUNTER (INPATIENT)
Age: 52
LOS: 1 days | Discharge: HOME | DRG: 331 | End: 2022-08-27
Attending: SURGERY | Admitting: SURGERY
Payer: COMMERCIAL

## 2022-08-26 ENCOUNTER — ANESTHESIA (OUTPATIENT)
Dept: OPERATING ROOM | Age: 52
DRG: 331 | End: 2022-08-26
Payer: COMMERCIAL

## 2022-08-26 DIAGNOSIS — C20 RECTAL CANCER (HCC): ICD-10-CM

## 2022-08-26 LAB
ABO/RH: NORMAL
ANION GAP SERPL CALCULATED.3IONS-SCNC: 15 MMOL/L (ref 3–16)
ANTIBODY SCREEN: NORMAL
BUN BLDV-MCNC: 13 MG/DL (ref 7–20)
CALCIUM SERPL-MCNC: 10 MG/DL (ref 8.3–10.6)
CHLORIDE BLD-SCNC: 99 MMOL/L (ref 99–110)
CO2: 24 MMOL/L (ref 21–32)
CREAT SERPL-MCNC: 0.9 MG/DL (ref 0.6–1.1)
GFR AFRICAN AMERICAN: >60
GFR NON-AFRICAN AMERICAN: >60
GLUCOSE BLD-MCNC: 96 MG/DL (ref 70–99)
HCT VFR BLD CALC: 45.7 % (ref 36–48)
HEMOGLOBIN: 15.4 G/DL (ref 12–16)
MCH RBC QN AUTO: 28.9 PG (ref 26–34)
MCHC RBC AUTO-ENTMCNC: 33.7 G/DL (ref 31–36)
MCV RBC AUTO: 85.9 FL (ref 80–100)
PDW BLD-RTO: 14.6 % (ref 12.4–15.4)
PLATELET # BLD: 311 K/UL (ref 135–450)
PMV BLD AUTO: 8.9 FL (ref 5–10.5)
POTASSIUM REFLEX MAGNESIUM: 4.3 MMOL/L (ref 3.5–5.1)
PREGNANCY, URINE: NEGATIVE
RBC # BLD: 5.31 M/UL (ref 4–5.2)
SODIUM BLD-SCNC: 138 MMOL/L (ref 136–145)
WBC # BLD: 11.7 K/UL (ref 4–11)

## 2022-08-26 PROCEDURE — 6360000002 HC RX W HCPCS: Performed by: ANESTHESIOLOGY

## 2022-08-26 PROCEDURE — C1729 CATH, DRAINAGE: HCPCS | Performed by: SURGERY

## 2022-08-26 PROCEDURE — 2580000003 HC RX 258: Performed by: NURSE ANESTHETIST, CERTIFIED REGISTERED

## 2022-08-26 PROCEDURE — 44213 LAP MOBIL SPLENIC FL ADD-ON: CPT | Performed by: SURGERY

## 2022-08-26 PROCEDURE — 3600000009 HC SURGERY ROBOT BASE: Performed by: SURGERY

## 2022-08-26 PROCEDURE — 6360000002 HC RX W HCPCS: Performed by: SURGERY

## 2022-08-26 PROCEDURE — 85027 COMPLETE CBC AUTOMATED: CPT

## 2022-08-26 PROCEDURE — 2720000010 HC SURG SUPPLY STERILE: Performed by: SURGERY

## 2022-08-26 PROCEDURE — C9290 INJ, BUPIVACAINE LIPOSOME: HCPCS | Performed by: ANESTHESIOLOGY

## 2022-08-26 PROCEDURE — 80048 BASIC METABOLIC PNL TOTAL CA: CPT

## 2022-08-26 PROCEDURE — 86900 BLOOD TYPING SEROLOGIC ABO: CPT

## 2022-08-26 PROCEDURE — 6360000002 HC RX W HCPCS: Performed by: NURSE ANESTHETIST, CERTIFIED REGISTERED

## 2022-08-26 PROCEDURE — 7100000001 HC PACU RECOVERY - ADDTL 15 MIN: Performed by: SURGERY

## 2022-08-26 PROCEDURE — 7100000000 HC PACU RECOVERY - FIRST 15 MIN: Performed by: SURGERY

## 2022-08-26 PROCEDURE — 3700000000 HC ANESTHESIA ATTENDED CARE: Performed by: SURGERY

## 2022-08-26 PROCEDURE — 2500000003 HC RX 250 WO HCPCS: Performed by: NURSE ANESTHETIST, CERTIFIED REGISTERED

## 2022-08-26 PROCEDURE — 44207 L COLECTOMY/COLOPROCTOSTOMY: CPT | Performed by: SURGERY

## 2022-08-26 PROCEDURE — 2500000003 HC RX 250 WO HCPCS: Performed by: ANESTHESIOLOGY

## 2022-08-26 PROCEDURE — 88305 TISSUE EXAM BY PATHOLOGIST: CPT

## 2022-08-26 PROCEDURE — 64488 TAP BLOCK BI INJECTION: CPT | Performed by: ANESTHESIOLOGY

## 2022-08-26 PROCEDURE — 2709999900 HC NON-CHARGEABLE SUPPLY: Performed by: SURGERY

## 2022-08-26 PROCEDURE — S2900 ROBOTIC SURGICAL SYSTEM: HCPCS | Performed by: SURGERY

## 2022-08-26 PROCEDURE — 1200000000 HC SEMI PRIVATE

## 2022-08-26 PROCEDURE — 88309 TISSUE EXAM BY PATHOLOGIST: CPT

## 2022-08-26 PROCEDURE — 88342 IMHCHEM/IMCYTCHM 1ST ANTB: CPT

## 2022-08-26 PROCEDURE — 3600000019 HC SURGERY ROBOT ADDTL 15MIN: Performed by: SURGERY

## 2022-08-26 PROCEDURE — 86850 RBC ANTIBODY SCREEN: CPT

## 2022-08-26 PROCEDURE — 6370000000 HC RX 637 (ALT 250 FOR IP): Performed by: SURGERY

## 2022-08-26 PROCEDURE — 2580000003 HC RX 258: Performed by: SURGERY

## 2022-08-26 PROCEDURE — 88341 IMHCHEM/IMCYTCHM EA ADD ANTB: CPT

## 2022-08-26 PROCEDURE — 84703 CHORIONIC GONADOTROPIN ASSAY: CPT

## 2022-08-26 PROCEDURE — 6370000000 HC RX 637 (ALT 250 FOR IP): Performed by: ANESTHESIOLOGY

## 2022-08-26 PROCEDURE — 3700000001 HC ADD 15 MINUTES (ANESTHESIA): Performed by: SURGERY

## 2022-08-26 PROCEDURE — 86901 BLOOD TYPING SEROLOGIC RH(D): CPT

## 2022-08-26 PROCEDURE — 2500000003 HC RX 250 WO HCPCS: Performed by: SURGERY

## 2022-08-26 RX ORDER — ROCURONIUM BROMIDE 10 MG/ML
INJECTION, SOLUTION INTRAVENOUS PRN
Status: DISCONTINUED | OUTPATIENT
Start: 2022-08-26 | End: 2022-08-26 | Stop reason: SDUPTHER

## 2022-08-26 RX ORDER — MIDAZOLAM HYDROCHLORIDE 1 MG/ML
INJECTION INTRAMUSCULAR; INTRAVENOUS PRN
Status: DISCONTINUED | OUTPATIENT
Start: 2022-08-26 | End: 2022-08-26 | Stop reason: SDUPTHER

## 2022-08-26 RX ORDER — DEXAMETHASONE SODIUM PHOSPHATE 4 MG/ML
INJECTION, SOLUTION INTRA-ARTICULAR; INTRALESIONAL; INTRAMUSCULAR; INTRAVENOUS; SOFT TISSUE PRN
Status: DISCONTINUED | OUTPATIENT
Start: 2022-08-26 | End: 2022-08-26 | Stop reason: SDUPTHER

## 2022-08-26 RX ORDER — DIPHENHYDRAMINE HYDROCHLORIDE 50 MG/ML
12.5 INJECTION INTRAMUSCULAR; INTRAVENOUS
Status: ACTIVE | OUTPATIENT
Start: 2022-08-26 | End: 2022-08-26

## 2022-08-26 RX ORDER — ACETAMINOPHEN 500 MG
1000 TABLET ORAL ONCE
Status: COMPLETED | OUTPATIENT
Start: 2022-08-26 | End: 2022-08-26

## 2022-08-26 RX ORDER — SODIUM CHLORIDE, SODIUM LACTATE, POTASSIUM CHLORIDE, CALCIUM CHLORIDE 600; 310; 30; 20 MG/100ML; MG/100ML; MG/100ML; MG/100ML
INJECTION, SOLUTION INTRAVENOUS CONTINUOUS
Status: DISCONTINUED | OUTPATIENT
Start: 2022-08-26 | End: 2022-08-27

## 2022-08-26 RX ORDER — BUPIVACAINE HYDROCHLORIDE 5 MG/ML
30 INJECTION, SOLUTION EPIDURAL; INTRACAUDAL ONCE
Status: DISCONTINUED | OUTPATIENT
Start: 2022-08-26 | End: 2022-08-27 | Stop reason: HOSPADM

## 2022-08-26 RX ORDER — HYDRALAZINE HYDROCHLORIDE 20 MG/ML
10 INJECTION INTRAMUSCULAR; INTRAVENOUS
Status: DISCONTINUED | OUTPATIENT
Start: 2022-08-26 | End: 2022-08-27 | Stop reason: HOSPADM

## 2022-08-26 RX ORDER — SODIUM CHLORIDE 0.9 % (FLUSH) 0.9 %
5-40 SYRINGE (ML) INJECTION EVERY 12 HOURS SCHEDULED
Status: DISCONTINUED | OUTPATIENT
Start: 2022-08-26 | End: 2022-08-26 | Stop reason: HOSPADM

## 2022-08-26 RX ORDER — METOCLOPRAMIDE HYDROCHLORIDE 5 MG/ML
10 INJECTION INTRAMUSCULAR; INTRAVENOUS
Status: ACTIVE | OUTPATIENT
Start: 2022-08-26 | End: 2022-08-26

## 2022-08-26 RX ORDER — LEVOFLOXACIN 5 MG/ML
500 INJECTION, SOLUTION INTRAVENOUS
Status: COMPLETED | OUTPATIENT
Start: 2022-08-26 | End: 2022-08-26

## 2022-08-26 RX ORDER — LABETALOL HYDROCHLORIDE 5 MG/ML
10 INJECTION, SOLUTION INTRAVENOUS
Status: DISCONTINUED | OUTPATIENT
Start: 2022-08-26 | End: 2022-08-27 | Stop reason: HOSPADM

## 2022-08-26 RX ORDER — SCOLOPAMINE TRANSDERMAL SYSTEM 1 MG/1
1 PATCH, EXTENDED RELEASE TRANSDERMAL ONCE
Status: DISCONTINUED | OUTPATIENT
Start: 2022-08-26 | End: 2022-08-27 | Stop reason: HOSPADM

## 2022-08-26 RX ORDER — SODIUM CHLORIDE, SODIUM LACTATE, POTASSIUM CHLORIDE, CALCIUM CHLORIDE 600; 310; 30; 20 MG/100ML; MG/100ML; MG/100ML; MG/100ML
INJECTION, SOLUTION INTRAVENOUS CONTINUOUS
Status: DISCONTINUED | OUTPATIENT
Start: 2022-08-26 | End: 2022-08-26 | Stop reason: HOSPADM

## 2022-08-26 RX ORDER — LIDOCAINE HYDROCHLORIDE 20 MG/ML
INJECTION, SOLUTION INTRAVENOUS PRN
Status: DISCONTINUED | OUTPATIENT
Start: 2022-08-26 | End: 2022-08-26 | Stop reason: SDUPTHER

## 2022-08-26 RX ORDER — SODIUM CHLORIDE 9 MG/ML
INJECTION, SOLUTION INTRAVENOUS PRN
Status: DISCONTINUED | OUTPATIENT
Start: 2022-08-26 | End: 2022-08-26 | Stop reason: HOSPADM

## 2022-08-26 RX ORDER — SODIUM CHLORIDE 0.9 % (FLUSH) 0.9 %
5-40 SYRINGE (ML) INJECTION PRN
Status: DISCONTINUED | OUTPATIENT
Start: 2022-08-26 | End: 2022-08-27 | Stop reason: HOSPADM

## 2022-08-26 RX ORDER — MEPERIDINE HYDROCHLORIDE 25 MG/ML
12.5 INJECTION INTRAMUSCULAR; INTRAVENOUS; SUBCUTANEOUS EVERY 5 MIN PRN
Status: DISCONTINUED | OUTPATIENT
Start: 2022-08-26 | End: 2022-08-27 | Stop reason: HOSPADM

## 2022-08-26 RX ORDER — MAGNESIUM HYDROXIDE 1200 MG/15ML
LIQUID ORAL PRN
Status: DISCONTINUED | OUTPATIENT
Start: 2022-08-26 | End: 2022-08-26 | Stop reason: HOSPADM

## 2022-08-26 RX ORDER — SODIUM CHLORIDE 0.9 % (FLUSH) 0.9 %
5-40 SYRINGE (ML) INJECTION EVERY 12 HOURS SCHEDULED
Status: DISCONTINUED | OUTPATIENT
Start: 2022-08-26 | End: 2022-08-27 | Stop reason: HOSPADM

## 2022-08-26 RX ORDER — HYDROMORPHONE HCL 110MG/55ML
PATIENT CONTROLLED ANALGESIA SYRINGE INTRAVENOUS PRN
Status: DISCONTINUED | OUTPATIENT
Start: 2022-08-26 | End: 2022-08-26 | Stop reason: SDUPTHER

## 2022-08-26 RX ORDER — SODIUM CHLORIDE, SODIUM LACTATE, POTASSIUM CHLORIDE, CALCIUM CHLORIDE 600; 310; 30; 20 MG/100ML; MG/100ML; MG/100ML; MG/100ML
INJECTION, SOLUTION INTRAVENOUS CONTINUOUS PRN
Status: DISCONTINUED | OUTPATIENT
Start: 2022-08-26 | End: 2022-08-26 | Stop reason: SDUPTHER

## 2022-08-26 RX ORDER — SODIUM CHLORIDE 0.9 % (FLUSH) 0.9 %
5-40 SYRINGE (ML) INJECTION PRN
Status: DISCONTINUED | OUTPATIENT
Start: 2022-08-26 | End: 2022-08-26 | Stop reason: HOSPADM

## 2022-08-26 RX ORDER — FENTANYL CITRATE 50 UG/ML
INJECTION, SOLUTION INTRAMUSCULAR; INTRAVENOUS PRN
Status: DISCONTINUED | OUTPATIENT
Start: 2022-08-26 | End: 2022-08-26 | Stop reason: SDUPTHER

## 2022-08-26 RX ORDER — BUPIVACAINE HYDROCHLORIDE 5 MG/ML
INJECTION, SOLUTION EPIDURAL; INTRACAUDAL PRN
Status: DISCONTINUED | OUTPATIENT
Start: 2022-08-26 | End: 2022-08-26 | Stop reason: SDUPTHER

## 2022-08-26 RX ORDER — SODIUM CHLORIDE 9 MG/ML
25 INJECTION, SOLUTION INTRAVENOUS PRN
Status: DISCONTINUED | OUTPATIENT
Start: 2022-08-26 | End: 2022-08-27 | Stop reason: HOSPADM

## 2022-08-26 RX ORDER — ENOXAPARIN SODIUM 100 MG/ML
40 INJECTION SUBCUTANEOUS ONCE
Status: COMPLETED | OUTPATIENT
Start: 2022-08-26 | End: 2022-08-26

## 2022-08-26 RX ORDER — ONDANSETRON 2 MG/ML
INJECTION INTRAMUSCULAR; INTRAVENOUS PRN
Status: DISCONTINUED | OUTPATIENT
Start: 2022-08-26 | End: 2022-08-26 | Stop reason: SDUPTHER

## 2022-08-26 RX ORDER — INDOCYANINE GREEN AND WATER 25 MG
KIT INJECTION PRN
Status: DISCONTINUED | OUTPATIENT
Start: 2022-08-26 | End: 2022-08-26 | Stop reason: SDUPTHER

## 2022-08-26 RX ORDER — TRAMADOL HYDROCHLORIDE 50 MG/1
50 TABLET ORAL
Status: ACTIVE | OUTPATIENT
Start: 2022-08-26 | End: 2022-08-26

## 2022-08-26 RX ORDER — PROPOFOL 10 MG/ML
INJECTION, EMULSION INTRAVENOUS PRN
Status: DISCONTINUED | OUTPATIENT
Start: 2022-08-26 | End: 2022-08-26 | Stop reason: SDUPTHER

## 2022-08-26 RX ORDER — SUMATRIPTAN 100 MG/1
100 TABLET, FILM COATED ORAL
COMMUNITY

## 2022-08-26 RX ORDER — GLYCOPYRROLATE 0.2 MG/ML
INJECTION INTRAMUSCULAR; INTRAVENOUS PRN
Status: DISCONTINUED | OUTPATIENT
Start: 2022-08-26 | End: 2022-08-26 | Stop reason: SDUPTHER

## 2022-08-26 RX ORDER — METRONIDAZOLE 500 MG/100ML
500 INJECTION, SOLUTION INTRAVENOUS
Status: COMPLETED | OUTPATIENT
Start: 2022-08-26 | End: 2022-08-26

## 2022-08-26 RX ORDER — SCOLOPAMINE TRANSDERMAL SYSTEM 1 MG/1
PATCH, EXTENDED RELEASE TRANSDERMAL
Status: DISPENSED
Start: 2022-08-26 | End: 2022-08-27

## 2022-08-26 RX ORDER — METHOCARBAMOL 750 MG/1
1500 TABLET, FILM COATED ORAL 4 TIMES DAILY
Status: DISCONTINUED | OUTPATIENT
Start: 2022-08-26 | End: 2022-08-27

## 2022-08-26 RX ORDER — APREPITANT 40 MG/1
40 CAPSULE ORAL ONCE
Status: COMPLETED | OUTPATIENT
Start: 2022-08-26 | End: 2022-08-26

## 2022-08-26 RX ADMIN — ROCURONIUM BROMIDE 30 MG: 10 INJECTION, SOLUTION INTRAVENOUS at 15:20

## 2022-08-26 RX ADMIN — METRONIDAZOLE 500 MG: 500 INJECTION, SOLUTION INTRAVENOUS at 14:06

## 2022-08-26 RX ADMIN — ENOXAPARIN SODIUM 40 MG: 100 INJECTION SUBCUTANEOUS at 12:51

## 2022-08-26 RX ADMIN — PHENYLEPHRINE HYDROCHLORIDE 30 MCG/MIN: 10 INJECTION INTRAVENOUS at 16:32

## 2022-08-26 RX ADMIN — PHENYLEPHRINE HYDROCHLORIDE 100 MCG: 10 INJECTION, SOLUTION INTRAMUSCULAR; INTRAVENOUS; SUBCUTANEOUS at 18:01

## 2022-08-26 RX ADMIN — SODIUM CHLORIDE, SODIUM LACTATE, POTASSIUM CHLORIDE, CALCIUM CHLORIDE: 600; 310; 30; 20 INJECTION, SOLUTION INTRAVENOUS at 13:49

## 2022-08-26 RX ADMIN — SODIUM CHLORIDE, SODIUM LACTATE, POTASSIUM CHLORIDE, CALCIUM CHLORIDE: 600; 310; 30; 20 INJECTION, SOLUTION INTRAVENOUS at 16:14

## 2022-08-26 RX ADMIN — SODIUM CHLORIDE, SODIUM LACTATE, POTASSIUM CHLORIDE, AND CALCIUM CHLORIDE: .6; .31; .03; .02 INJECTION, SOLUTION INTRAVENOUS at 14:06

## 2022-08-26 RX ADMIN — DEXAMETHASONE SODIUM PHOSPHATE 8 MG: 4 INJECTION, SOLUTION INTRAMUSCULAR; INTRAVENOUS at 13:59

## 2022-08-26 RX ADMIN — FENTANYL CITRATE 100 MCG: 50 INJECTION, SOLUTION INTRAMUSCULAR; INTRAVENOUS at 13:39

## 2022-08-26 RX ADMIN — APREPITANT 40 MG: 40 CAPSULE ORAL at 12:43

## 2022-08-26 RX ADMIN — ROCURONIUM BROMIDE 50 MG: 10 INJECTION, SOLUTION INTRAVENOUS at 13:39

## 2022-08-26 RX ADMIN — BUPIVACAINE 20 ML: 13.3 INJECTION, SUSPENSION, LIPOSOMAL INFILTRATION at 18:30

## 2022-08-26 RX ADMIN — ROCURONIUM BROMIDE 20 MG: 10 INJECTION, SOLUTION INTRAVENOUS at 16:32

## 2022-08-26 RX ADMIN — GLYCOPYRROLATE 0.2 MG: 0.2 INJECTION, SOLUTION INTRAMUSCULAR; INTRAVENOUS at 13:59

## 2022-08-26 RX ADMIN — FENTANYL CITRATE 100 MCG: 50 INJECTION, SOLUTION INTRAMUSCULAR; INTRAVENOUS at 14:14

## 2022-08-26 RX ADMIN — ACETAMINOPHEN 1000 MG: 500 TABLET ORAL at 12:52

## 2022-08-26 RX ADMIN — ROCURONIUM BROMIDE 20 MG: 10 INJECTION, SOLUTION INTRAVENOUS at 14:55

## 2022-08-26 RX ADMIN — LIDOCAINE HYDROCHLORIDE 50 MG: 20 INJECTION, SOLUTION INTRAVENOUS at 13:39

## 2022-08-26 RX ADMIN — ONDANSETRON 4 MG: 2 INJECTION INTRAMUSCULAR; INTRAVENOUS at 13:59

## 2022-08-26 RX ADMIN — GLYCOPYRROLATE 0.2 MG: 0.2 INJECTION, SOLUTION INTRAMUSCULAR; INTRAVENOUS at 17:43

## 2022-08-26 RX ADMIN — HYDROMORPHONE HYDROCHLORIDE 1 MG: 2 INJECTION, SOLUTION INTRAMUSCULAR; INTRAVENOUS; SUBCUTANEOUS at 16:58

## 2022-08-26 RX ADMIN — ROCURONIUM BROMIDE 30 MG: 10 INJECTION, SOLUTION INTRAVENOUS at 14:14

## 2022-08-26 RX ADMIN — PHENYLEPHRINE HYDROCHLORIDE 100 MCG: 10 INJECTION, SOLUTION INTRAMUSCULAR; INTRAVENOUS; SUBCUTANEOUS at 15:27

## 2022-08-26 RX ADMIN — LEVOFLOXACIN 500 MG: 5 INJECTION, SOLUTION INTRAVENOUS at 14:01

## 2022-08-26 RX ADMIN — PROPOFOL 200 MG: 10 INJECTION, EMULSION INTRAVENOUS at 13:39

## 2022-08-26 RX ADMIN — HYDROMORPHONE HYDROCHLORIDE 0.5 MG: 1 INJECTION, SOLUTION INTRAMUSCULAR; INTRAVENOUS; SUBCUTANEOUS at 21:54

## 2022-08-26 RX ADMIN — SODIUM CHLORIDE, SODIUM LACTATE, POTASSIUM CHLORIDE, AND CALCIUM CHLORIDE: .6; .31; .03; .02 INJECTION, SOLUTION INTRAVENOUS at 13:34

## 2022-08-26 RX ADMIN — HYDROMORPHONE HYDROCHLORIDE 1 MG: 2 INJECTION, SOLUTION INTRAMUSCULAR; INTRAVENOUS; SUBCUTANEOUS at 16:34

## 2022-08-26 RX ADMIN — ROCURONIUM BROMIDE 20 MG: 10 INJECTION, SOLUTION INTRAVENOUS at 14:32

## 2022-08-26 RX ADMIN — PHENYLEPHRINE HYDROCHLORIDE 100 MCG: 10 INJECTION, SOLUTION INTRAMUSCULAR; INTRAVENOUS; SUBCUTANEOUS at 14:01

## 2022-08-26 RX ADMIN — MEPERIDINE HYDROCHLORIDE 12.5 MG: 25 INJECTION INTRAMUSCULAR; INTRAVENOUS; SUBCUTANEOUS at 19:30

## 2022-08-26 RX ADMIN — METHOCARBAMOL 1000 MG: 100 INJECTION, SOLUTION INTRAMUSCULAR; INTRAVENOUS at 20:59

## 2022-08-26 RX ADMIN — BUPIVACAINE HYDROCHLORIDE 40 ML: 5 INJECTION, SOLUTION EPIDURAL; INTRACAUDAL; PERINEURAL at 18:30

## 2022-08-26 RX ADMIN — MIDAZOLAM HYDROCHLORIDE 2 MG: 2 INJECTION, SOLUTION INTRAMUSCULAR; INTRAVENOUS at 13:33

## 2022-08-26 RX ADMIN — INDOCYANINE GREEN AND WATER 5 MG: KIT at 16:03

## 2022-08-26 RX ADMIN — PHENYLEPHRINE HYDROCHLORIDE 100 MCG: 10 INJECTION, SOLUTION INTRAMUSCULAR; INTRAVENOUS; SUBCUTANEOUS at 14:13

## 2022-08-26 ASSESSMENT — PAIN DESCRIPTION - PAIN TYPE: TYPE: SURGICAL PAIN

## 2022-08-26 ASSESSMENT — PAIN - FUNCTIONAL ASSESSMENT: PAIN_FUNCTIONAL_ASSESSMENT: 0-10

## 2022-08-26 ASSESSMENT — PAIN DESCRIPTION - ORIENTATION: ORIENTATION: MID;LOWER

## 2022-08-26 ASSESSMENT — PAIN DESCRIPTION - DESCRIPTORS: DESCRIPTORS: SHARP

## 2022-08-26 ASSESSMENT — PAIN SCALES - GENERAL
PAINLEVEL_OUTOF10: 4
PAINLEVEL_OUTOF10: 7
PAINLEVEL_OUTOF10: 0

## 2022-08-26 ASSESSMENT — PAIN DESCRIPTION - LOCATION: LOCATION: ABDOMEN

## 2022-08-26 ASSESSMENT — PAIN DESCRIPTION - FREQUENCY: FREQUENCY: CONTINUOUS

## 2022-08-26 NOTE — ANESTHESIA PRE PROCEDURE
Department of Anesthesiology  Preprocedure Note       Name:  Lui Jones   Age:  46 y.o.  :  1970                                          MRN:  [id number]         Date:  2022      Surgeon: Sheree Daniels):  Cammy Vaca MD    Procedure: Procedure(s):  ROBOTIC LOW ANTERIOR RESECTION WITH COLORECTAL ANASTAMOSIS    Medications prior to admission:   Prior to Admission medications    Medication Sig Start Date End Date Taking? Authorizing Provider   SUMAtriptan (IMITREX) 100 MG tablet Take 100 mg by mouth once as needed for Migraine   Yes Historical Provider, MD   metroNIDAZOLE (FLAGYL) 500 MG tablet Take one tablet by mouth 3 times on the day prior to surgery. Take one tablet at 1pm, 2pm and 9pm. 8/15/22   Cammy Vaca MD   neomycin (MYCIFRADIN) 500 MG tablet Take two tablets 3 times the day before surgery.  Take two tablets at 1pm, two tablets at 2pm and two tablets at 9pm. 8/15/22   Cammy Vaca MD   ondansetron (ZOFRAN ODT) 4 MG disintegrating tablet Place 1 tablet under the tongue every 8 hours as needed for Nausea or Vomiting 8/15/22   Cammy Vaca MD       Current medications:    Current Facility-Administered Medications   Medication Dose Route Frequency Provider Last Rate Last Admin    lactated ringers infusion   IntraVENous Continuous Jeanette Ruffing, DO        sodium chloride flush 0.9 % injection 5-40 mL  5-40 mL IntraVENous 2 times per day Jeanette Ruffing, DO        sodium chloride flush 0.9 % injection 5-40 mL  5-40 mL IntraVENous PRN Jeanette Ruffing, DO        0.9 % sodium chloride infusion   IntraVENous PRN Jeanette Ruffing, DO        sodium chloride flush 0.9 % injection 5-40 mL  5-40 mL IntraVENous 2 times per day Cammy Vaca MD        sodium chloride flush 0.9 % injection 5-40 mL  5-40 mL IntraVENous PRN Cammy Vaca MD        0.9 % sodium chloride infusion   IntraVENous PRN Cammy Vaca MD        metronidazole (FLAGYL) 500 mg in 0.9% NaCl 100 mL IVPB premix  500 mg IntraVENous On Call to Via Meena Pacheco MD        And    levoFLOXacin (LEVAQUIN) 500 MG/100ML infusion 500 mg  500 mg IntraVENous On Call to Via Meena Pacheco MD        scopolamine (TRANSDERM-SCOP) transdermal patch 1 patch  1 patch TransDERmal Once Jerrica Modi MD   1 patch at 08/26/22 1245    bupivacaine (PF) (MARCAINE) 0.5 % injection 150 mg  30 mL Infiltration Once Jerrica Modi MD        scopolamine (TRANSDERM-SCOP) 1 MG/3DAYS transdermal patch                Allergies: Allergies   Allergen Reactions    Oxycodone-Acetaminophen Itching       Problem List:    Patient Active Problem List   Diagnosis Code    Rotator cuff tendinitis M75.80    Left shoulder pain M25.512    Migraines G43.909    Obesity (BMI 30-39. 9) E66.9    Low back pain M54.50    Rosacea L71.9    Chronic thumb pain, left M79.645, G89.29    Scalp cyst L72.9    Neoplasm of uncertain behavior of skin D48.5    Primary malignant neoplasm of rectum (HCC) C20    Nummular eczema L30.0       Past Medical History:        Diagnosis Date    colorectal 07/19/2022    Cornea abrasion 2020    cornea tear     Migraines     uses imitrex as needed    PONV (postoperative nausea and vomiting)        Past Surgical History:        Procedure Laterality Date    ABDOMINOPLASTY      APPENDECTOMY  1983    ARM SURGERY N/A 5/18/2020    EXCISION SCALP CYST x TWO, EXCISION SKIN LESION LEFT LEG performed by Osmar Lopez MD at [address] [place] [address]  12-5-2012    AMBER BREAST REDUCTION; ABDOMINOPLASTY    COLONOSCOPY N/A 7/19/2022    COLONOSCOPY WITH BIOPSY performed by Arianna Cortes DO at [address] [place] [address] N/A 7/19/2022    COLONOSCOPY POLYPECTOMY SNARE/COLD BIOPSY performed by Arianna Cortes DO at [address] [place] [address]  7/19/2022    COLONOSCOPY SUBMUCOSAL/INK TATTOO  INJECTION performed by Arianna Cortes DO at [facility] CYST x TWO, EXCISION SKIN LESION LEFT LEG     DENTAL SURGERY      teeth extracted    ENDOMETRIAL ABLATION  2007    SIGMOIDOSCOPY N/A 2022    ENDOSCOPIC ULTRASOUND OF RECTUM performed by Mayito Bianchi MD at [facility] ENDOSCOPY       Social History:    Social History     Tobacco Use    Smoking status: Former     Types: Cigarettes     Start date: 1988     Quit date: 1995     Years since quittin.6    Smokeless tobacco: Never   Substance Use Topics    Alcohol use: Not Currently     Comment: occasionally                                Counseling given: Not Answered      Vital Signs (Current):   Vitals:    22 1530 22 1109   BP:  117/80   Pulse:  96   Resp:  16   Temp:  97.7 °F (36.5 °C)   TempSrc:  Temporal   SpO2:  98%   Weight: 195 lb (88.5 kg) 193 lb 3.2 oz (87.6 kg)   Height: 5' 8\" (1.727 m) 5' 8\" (1.727 m)                                              BP Readings from Last 3 Encounters:   22 117/80   22 118/76   22 126/76       NPO Status: Time of last liquid consumption:                         Time of last solid consumption:                         Date of last liquid consumption: 22                        Date of last solid food consumption: 22    BMI:   Wt Readings from Last 3 Encounters:   22 193 lb 3.2 oz (87.6 kg)   22 195 lb (88.5 kg)   22 194 lb (88 kg)     Body mass index is 29.38 kg/m².     CBC:   Lab Results   Component Value Date/Time    WBC 11.7 2022 12:10 PM    RBC 5.31 2022 12:10 PM    HGB 15.4 2022 12:10 PM    HCT 45.7 2022 12:10 PM    MCV 85.9 2022 12:10 PM    RDW 14.6 2022 12:10 PM     2022 12:10 PM       CMP:   Lab Results   Component Value Date/Time     10/31/2018 09:29 AM    K 4.2 10/31/2018 09:29 AM     10/31/2018 09:29 AM    CO2 25 10/31/2018 09:29 AM    BUN 14 10/31/2018 09:29 AM    CREATININE 0.6 10/31/2018 09:29 AM    GFRAA >60 10/31/2018 09:29 AM    GFRAA >60 09/20/2012 09:37 PM    AGRATIO 1.5 10/31/2018 09:29 AM    LABGLOM >60 10/31/2018 09:29 AM    GLUCOSE 91 10/31/2018 09:29 AM    PROT 7.5 10/31/2018 09:29 AM    PROT 7.5 09/20/2012 09:37 PM    CALCIUM 10.0 10/31/2018 09:29 AM    BILITOT 0.4 10/31/2018 09:29 AM    ALKPHOS 93 10/31/2018 09:29 AM    AST 24 10/31/2018 09:29 AM    ALT 28 10/31/2018 09:29 AM       POC Tests: No results for input(s): POCGLU, POCNA, POCK, POCCL, POCBUN, POCHEMO, POCHCT in the last 72 hours. Coags: No results found for: PROTIME, INR, APTT    HCG (If Applicable):   Lab Results   Component Value Date    PREGTESTUR Negative 11/05/2012        ABGs: No results found for: PHART, PO2ART, TKT7QBL, GYK6DFJ, BEART, K7QZJTDJ     Type & Screen (If Applicable):  Lab Results   Component Value Date    LABABO B 11/05/2012    [address] Positive 11/05/2012       Drug/Infectious Status (If Applicable):  No results found for: HIV, HEPCAB    COVID-19 Screening (If Applicable):   Lab Results   Component Value Date/Time    COVID19 Not Detected 05/14/2020 03:47 PM           Anesthesia Evaluation  Patient summary reviewed and Nursing notes reviewed   history of anesthetic complications: PONV. Airway: Mallampati: II  TM distance: >3 FB   Neck ROM: full  Mouth opening: > = 3 FB   Dental:          Pulmonary:Negative Pulmonary ROS                              Cardiovascular:Negative CV ROS                      Neuro/Psych:   (+) headaches: migraine headaches,             GI/Hepatic/Renal: Neg GI/Hepatic/Renal ROS            Endo/Other:    (+) malignancy/cancer (Rectal CA). Abdominal:             Vascular: Other Findings:           Anesthesia Plan      general     ASA 1    (59-year-old presents for ROBOTIC LOW ANTERIOR RESECTION WITH COLORECTAL ANASTAMOSIS. Plan general anesthesia with ASA standard monitors; bilateral TAP blocks for postoperative pain control as requested by the attending surgeon. Questions answered.   Patient agreeable with anesthetic plan.  )  Induction: intravenous. Anesthetic plan and risks discussed with patient. Plan discussed with CRNA.     Attending anesthesiologist reviewed and agrees with Preprocedure content      Post-op pain plan if not by surgeon: single peripheral nerve block            Deandra Tejada MD   8/26/2022

## 2022-08-26 NOTE — ANESTHESIA POSTPROCEDURE EVALUATION
Department of Anesthesiology  Postprocedure Note    Patient: Shaan Bright  MRN: [id number]  YOB: 1970  Date of evaluation: 8/26/2022      Procedure Summary     Date: 08/26/22 Room / Location: [address] [place] [address]    Anesthesia Start: 0828 Anesthesia Stop: 1837    Procedure: ROBOTIC LOW ANTERIOR RESECTION WITH COLORECTAL ANASTAMOSIS, LAPAROSCOPIC MOBILIZATION OF SPLENIC FLEXURE Diagnosis:       Rectal cancer ([address]      (Rectal cancer ([address] [C20])    Surgeons: Carolina Navarro MD Responsible Provider: Brian Ramirez DO    Anesthesia Type: general ASA Status: 3          Anesthesia Type: No value filed.     Patricia Phase I: Patricia Score: 8    Patricia Phase II:        Anesthesia Post Evaluation    Patient location during evaluation: PACU  Patient participation: complete - patient participated  Level of consciousness: awake and alert  Pain score: 0  Airway patency: patent  Nausea & Vomiting: no nausea and no vomiting  Cardiovascular status: blood pressure returned to baseline  Respiratory status: acceptable  Hydration status: euvolemic  Multimodal analgesia pain management approach

## 2022-08-26 NOTE — H&P
PRE-OP/PRE-PROCEDURE H&P    Visit Date: 8/26/2022    History:     Amador Watson is a 46 y.o. female who presents today for procedure. See my/PCP/oncologist office notes for indications and details. Patient Active Problem List:     Rotator cuff tendinitis     Left shoulder pain     Migraines     Obesity (BMI 30-39. 9)     Low back pain     Rosacea     Chronic thumb pain, left     Scalp cyst     Neoplasm of uncertain behavior of skin     Primary malignant neoplasm of rectum (HCC)     Nummular eczema         Current Facility-Administered Medications:     lactated ringers infusion, , IntraVENous, Continuous, Laura Prieto, DO    sodium chloride flush 0.9 % injection 5-40 mL, 5-40 mL, IntraVENous, 2 times per day, Laura Prieto, DO    sodium chloride flush 0.9 % injection 5-40 mL, 5-40 mL, IntraVENous, PRN, Laura Vargasvers, DO    0.9 % sodium chloride infusion, , IntraVENous, PRN, Laura Vargasvers, DO    sodium chloride flush 0.9 % injection 5-40 mL, 5-40 mL, IntraVENous, 2 times per day, Rachel Zhang MD    sodium chloride flush 0.9 % injection 5-40 mL, 5-40 mL, IntraVENous, PRN, Rachel Zhang MD    0.9 % sodium chloride infusion, , IntraVENous, PRN, Rachel Zhang MD    acetaminophen (TYLENOL) tablet 1,000 mg, 1,000 mg, Oral, Once, Rachel Zhang MD    enoxaparin (LOVENOX) injection 40 mg, 40 mg, SubCUTAneous, Once, Rachel Zhang MD    metronidazole (FLAGYL) 500 mg in 0.9% NaCl 100 mL IVPB premix, 500 mg, IntraVENous, On Call to OR **AND** levoFLOXacin (LEVAQUIN) 500 MG/100ML infusion 500 mg, 500 mg, IntraVENous, On Call to OR, Rachel Zhang MD  Prior to Admission medications    Medication Sig Start Date End Date Taking? Authorizing Provider   metroNIDAZOLE (FLAGYL) 500 MG tablet Take one tablet by mouth 3 times on the day prior to surgery. Take one tablet at 1pm, 2pm and 9pm. 8/15/22   Rachel Zhang MD   neomycin (MYCIFRADIN) 500 MG tablet Take two tablets 3 times the day before surgery.  Take two

## 2022-08-26 NOTE — PROGRESS NOTES
ROBOTIC LOW ANTERIOR RESECTION WITH COLORECTAL ANASTAMOSIS, [employer]    Current Allergies: Oxycodone-acetaminophen    No results for input(s): POCGLU in the last 72 hours. Admitted to PACU bed 11 from OR. Arrived on a bed . Attached to PACU monitoring system. Alarms and parameters set. Report received from anesthesia personnel [id number] staff did not report skin issues that were observed while in OR  No problems reported intraoperatively. Pt arrived with oxygen per  simple mask  with oxygen at 5 liters. Oral airway in place  Athrombic wraps in place. IV fluids to be set at 75 ml/hr LR  Can have PO once on floor    Doctors aware of all labs before coming to recovery.

## 2022-08-26 NOTE — ANESTHESIA PROCEDURE NOTES
Peripheral Block    Patient location during procedure: OR  Reason for block: procedure for pain, post-op pain management and at surgeon's request  Start time: 8/26/2022 6:50 PM  Staffing  Performed: anesthesiologist   Preanesthetic Checklist  Completed: patient identified, IV checked, site marked, risks and benefits discussed, surgical/procedural consents, equipment checked, pre-op evaluation, timeout performed, anesthesia consent given, oxygen available, monitors applied/VS acknowledged, fire risk safety assessment completed and verbalized and blood product R/B/A discussed and consented  Peripheral Block   Patient position: supine  Prep: ChloraPrep  Patient monitoring: cardiac monitor, continuous pulse ox, continuous capnometry, frequent blood pressure checks, IV access and oxygen  Block type: TAP  Laterality: bilateral  Injection technique: single-shot  Guidance: ultrasound guided    Needle   Needle gauge: 25 G  Needle localization: anatomical landmarks and ultrasound guidance  Assessment   Injection assessment: negative aspiration for heme, no paresthesia on injection, local visualized surrounding nerve on ultrasound and no intravascular symptoms  Paresthesia pain: none  Slow fractionated injection: yes  Hemodynamics: stable  Real-time US image taken/store: yes  Outcomes: uncomplicated    Additional Notes  Sterile prep. Timeout with CRNA. 20 mL 0.5% Bupivacaine + 10 mL exparel injected in 5 mL increments following negative aspiration. Procedure completed bilaterally. Tip of needle in view at all times. Pt tolerated the procedure well.

## 2022-08-26 NOTE — BRIEF OP NOTE
Brief Postoperative Note      Patient: Larissa Nassar  YOB: 1970  MRN: [id number]    Date of Procedure: 8/26/2022    Pre-Op Diagnosis: Rectal cancer ([facility] [C20]    Post-Op Diagnosis: Same       Procedure(s):  ROBOTIC LOW ANTERIOR RESECTION WITH COLORECTAL ANASTAMOSIS, LAPAROSCOPIC MOBILIZATION OF SPLENIC FLEXURE    Surgeon(s):  Delicia Chowdary MD    Assistant:  Surgical Assistant: Yelena Srivastava  Resident: Theresa Cervantes MD    Anesthesia: General    Estimated Blood Loss (mL): 46DA    Complications: None    Specimens:   ID Type Source Tests Collected by Time Destination   A : A. LOW ANTERIOR RESECTION  Tissue Tissue SURGICAL PATHOLOGY Delicia Chowdary MD 8/26/2022 1640    B : B. DISTAL RING  Tissue Tissue SURGICAL PATHOLOGY Delicia Chowdary MD 8/26/2022 1712      Drains:   Urinary Catheter 08/26/22 2 Way (Active)     Findings:   Procedure as listed. Intraoperative leak test negative. No complications. Further details to follow in full operative report. Plan:  Admit to general surgical floor following recovery in PACU.     Electronically signed by Theresa Cervantes MD on 8/26/2022 at 6:13 PM

## 2022-08-27 VITALS
SYSTOLIC BLOOD PRESSURE: 110 MMHG | BODY MASS INDEX: 29.28 KG/M2 | TEMPERATURE: 98 F | DIASTOLIC BLOOD PRESSURE: 70 MMHG | HEART RATE: 100 BPM | RESPIRATION RATE: 18 BRPM | HEIGHT: 68 IN | WEIGHT: 193.2 LBS | OXYGEN SATURATION: 95 %

## 2022-08-27 LAB
ALBUMIN SERPL-MCNC: 3.8 G/DL (ref 3.4–5)
ANION GAP SERPL CALCULATED.3IONS-SCNC: 14 MMOL/L (ref 3–16)
BASOPHILS ABSOLUTE: 0 K/UL (ref 0–0.2)
BASOPHILS RELATIVE PERCENT: 0.1 %
BUN BLDV-MCNC: 8 MG/DL (ref 7–20)
CALCIUM SERPL-MCNC: 9.2 MG/DL (ref 8.3–10.6)
CHLORIDE BLD-SCNC: 103 MMOL/L (ref 99–110)
CO2: 20 MMOL/L (ref 21–32)
CREAT SERPL-MCNC: 0.6 MG/DL (ref 0.6–1.1)
EOSINOPHILS ABSOLUTE: 0 K/UL (ref 0–0.6)
EOSINOPHILS RELATIVE PERCENT: 0.1 %
GFR AFRICAN AMERICAN: >60
GFR NON-AFRICAN AMERICAN: >60
GLUCOSE BLD-MCNC: 119 MG/DL (ref 70–99)
HCT VFR BLD CALC: 39.4 % (ref 36–48)
HEMOGLOBIN: 13.4 G/DL (ref 12–16)
LYMPHOCYTES ABSOLUTE: 1.4 K/UL (ref 1–5.1)
LYMPHOCYTES RELATIVE PERCENT: 8.8 %
MAGNESIUM: 1.7 MG/DL (ref 1.8–2.4)
MCH RBC QN AUTO: 28.5 PG (ref 26–34)
MCHC RBC AUTO-ENTMCNC: 34 G/DL (ref 31–36)
MCV RBC AUTO: 83.8 FL (ref 80–100)
MONOCYTES ABSOLUTE: 0.7 K/UL (ref 0–1.3)
MONOCYTES RELATIVE PERCENT: 4.6 %
NEUTROPHILS ABSOLUTE: 13.8 K/UL (ref 1.7–7.7)
NEUTROPHILS RELATIVE PERCENT: 86.4 %
PDW BLD-RTO: 14.4 % (ref 12.4–15.4)
PHOSPHORUS: 3 MG/DL (ref 2.5–4.9)
PLATELET # BLD: 245 K/UL (ref 135–450)
PMV BLD AUTO: 9.3 FL (ref 5–10.5)
POTASSIUM SERPL-SCNC: 4.5 MMOL/L (ref 3.5–5.1)
RBC # BLD: 4.7 M/UL (ref 4–5.2)
SODIUM BLD-SCNC: 137 MMOL/L (ref 136–145)
WBC # BLD: 16 K/UL (ref 4–11)

## 2022-08-27 PROCEDURE — 94799 UNLISTED PULMONARY SVC/PX: CPT

## 2022-08-27 PROCEDURE — 99024 POSTOP FOLLOW-UP VISIT: CPT | Performed by: SURGERY

## 2022-08-27 PROCEDURE — 0DBP4ZZ EXCISION OF RECTUM, PERCUTANEOUS ENDOSCOPIC APPROACH: ICD-10-PCS | Performed by: SURGERY

## 2022-08-27 PROCEDURE — 6360000002 HC RX W HCPCS: Performed by: SURGERY

## 2022-08-27 PROCEDURE — 6370000000 HC RX 637 (ALT 250 FOR IP): Performed by: SURGERY

## 2022-08-27 PROCEDURE — 85025 COMPLETE CBC W/AUTO DIFF WBC: CPT

## 2022-08-27 PROCEDURE — 0DJD8ZZ INSPECTION OF LOWER INTESTINAL TRACT, VIA NATURAL OR ARTIFICIAL OPENING ENDOSCOPIC: ICD-10-PCS | Performed by: SURGERY

## 2022-08-27 PROCEDURE — 6370000000 HC RX 637 (ALT 250 FOR IP): Performed by: STUDENT IN AN ORGANIZED HEALTH CARE EDUCATION/TRAINING PROGRAM

## 2022-08-27 PROCEDURE — 94150 VITAL CAPACITY TEST: CPT

## 2022-08-27 PROCEDURE — 2580000003 HC RX 258: Performed by: ANESTHESIOLOGY

## 2022-08-27 PROCEDURE — 80069 RENAL FUNCTION PANEL: CPT

## 2022-08-27 PROCEDURE — 36415 COLL VENOUS BLD VENIPUNCTURE: CPT

## 2022-08-27 PROCEDURE — 8E0W4CZ ROBOTIC ASSISTED PROCEDURE OF TRUNK REGION, PERCUTANEOUS ENDOSCOPIC APPROACH: ICD-10-PCS | Performed by: SURGERY

## 2022-08-27 PROCEDURE — 0DBN4ZZ EXCISION OF SIGMOID COLON, PERCUTANEOUS ENDOSCOPIC APPROACH: ICD-10-PCS | Performed by: SURGERY

## 2022-08-27 PROCEDURE — 83735 ASSAY OF MAGNESIUM: CPT

## 2022-08-27 PROCEDURE — 2580000003 HC RX 258: Performed by: SURGERY

## 2022-08-27 RX ORDER — TRAMADOL HYDROCHLORIDE 50 MG/1
50 TABLET ORAL EVERY 6 HOURS PRN
Status: DISCONTINUED | OUTPATIENT
Start: 2022-08-27 | End: 2022-08-27 | Stop reason: HOSPADM

## 2022-08-27 RX ORDER — OXYCODONE HYDROCHLORIDE 5 MG/1
10 TABLET ORAL EVERY 4 HOURS PRN
Status: DISCONTINUED | OUTPATIENT
Start: 2022-08-27 | End: 2022-08-27

## 2022-08-27 RX ORDER — ONDANSETRON 4 MG/1
4 TABLET, ORALLY DISINTEGRATING ORAL EVERY 8 HOURS PRN
Status: DISCONTINUED | OUTPATIENT
Start: 2022-08-27 | End: 2022-08-27 | Stop reason: HOSPADM

## 2022-08-27 RX ORDER — METHOCARBAMOL 500 MG/1
500 TABLET, FILM COATED ORAL 4 TIMES DAILY
Status: DISCONTINUED | OUTPATIENT
Start: 2022-08-27 | End: 2022-08-27

## 2022-08-27 RX ORDER — SODIUM CHLORIDE 0.9 % (FLUSH) 0.9 %
5-40 SYRINGE (ML) INJECTION EVERY 12 HOURS SCHEDULED
Status: DISCONTINUED | OUTPATIENT
Start: 2022-08-27 | End: 2022-08-27 | Stop reason: HOSPADM

## 2022-08-27 RX ORDER — ACETAMINOPHEN 500 MG
1000 TABLET ORAL EVERY 6 HOURS
Status: DISCONTINUED | OUTPATIENT
Start: 2022-08-27 | End: 2022-08-27 | Stop reason: HOSPADM

## 2022-08-27 RX ORDER — IBUPROFEN 200 MG
400 TABLET ORAL EVERY 8 HOURS
Status: DISCONTINUED | OUTPATIENT
Start: 2022-08-27 | End: 2022-08-27 | Stop reason: HOSPADM

## 2022-08-27 RX ORDER — METHOCARBAMOL 500 MG/1
500 TABLET, FILM COATED ORAL 3 TIMES DAILY
Qty: 30 TABLET | Refills: 0 | Status: SHIPPED | OUTPATIENT
Start: 2022-08-27 | End: 2022-09-06

## 2022-08-27 RX ORDER — ENOXAPARIN SODIUM 100 MG/ML
40 INJECTION SUBCUTANEOUS DAILY
Status: DISCONTINUED | OUTPATIENT
Start: 2022-08-27 | End: 2022-08-27 | Stop reason: HOSPADM

## 2022-08-27 RX ORDER — ONDANSETRON 2 MG/ML
4 INJECTION INTRAMUSCULAR; INTRAVENOUS EVERY 6 HOURS PRN
Status: DISCONTINUED | OUTPATIENT
Start: 2022-08-27 | End: 2022-08-27 | Stop reason: HOSPADM

## 2022-08-27 RX ORDER — TRAMADOL HYDROCHLORIDE 50 MG/1
100 TABLET ORAL EVERY 6 HOURS PRN
Status: DISCONTINUED | OUTPATIENT
Start: 2022-08-27 | End: 2022-08-27 | Stop reason: HOSPADM

## 2022-08-27 RX ORDER — SODIUM CHLORIDE 9 MG/ML
INJECTION, SOLUTION INTRAVENOUS PRN
Status: DISCONTINUED | OUTPATIENT
Start: 2022-08-27 | End: 2022-08-27 | Stop reason: HOSPADM

## 2022-08-27 RX ORDER — MAGNESIUM SULFATE IN WATER 40 MG/ML
2000 INJECTION, SOLUTION INTRAVENOUS ONCE
Status: COMPLETED | OUTPATIENT
Start: 2022-08-27 | End: 2022-08-27

## 2022-08-27 RX ORDER — OXYCODONE HYDROCHLORIDE 5 MG/1
5 TABLET ORAL EVERY 4 HOURS PRN
Status: DISCONTINUED | OUTPATIENT
Start: 2022-08-27 | End: 2022-08-27

## 2022-08-27 RX ORDER — SUMATRIPTAN 100 MG/1
100 TABLET, FILM COATED ORAL
Status: DISCONTINUED | OUTPATIENT
Start: 2022-08-27 | End: 2022-08-27 | Stop reason: HOSPADM

## 2022-08-27 RX ORDER — SODIUM CHLORIDE 0.9 % (FLUSH) 0.9 %
5-40 SYRINGE (ML) INJECTION PRN
Status: DISCONTINUED | OUTPATIENT
Start: 2022-08-27 | End: 2022-08-27 | Stop reason: HOSPADM

## 2022-08-27 RX ORDER — TRAMADOL HYDROCHLORIDE 50 MG/1
50 TABLET ORAL EVERY 6 HOURS PRN
Qty: 20 TABLET | Refills: 0 | Status: SHIPPED | OUTPATIENT
Start: 2022-08-27 | End: 2022-09-01

## 2022-08-27 RX ORDER — METHOCARBAMOL 500 MG/1
500 TABLET, FILM COATED ORAL 4 TIMES DAILY
Status: DISCONTINUED | OUTPATIENT
Start: 2022-08-27 | End: 2022-08-27 | Stop reason: HOSPADM

## 2022-08-27 RX ADMIN — ACETAMINOPHEN 1000 MG: 500 TABLET ORAL at 12:58

## 2022-08-27 RX ADMIN — IBUPROFEN 400 MG: 200 TABLET, FILM COATED ORAL at 08:31

## 2022-08-27 RX ADMIN — SODIUM CHLORIDE, POTASSIUM CHLORIDE, SODIUM LACTATE AND CALCIUM CHLORIDE: 600; 310; 30; 20 INJECTION, SOLUTION INTRAVENOUS at 00:37

## 2022-08-27 RX ADMIN — METHOCARBAMOL 500 MG: 500 TABLET ORAL at 12:59

## 2022-08-27 RX ADMIN — ENOXAPARIN SODIUM 40 MG: 100 INJECTION SUBCUTANEOUS at 08:31

## 2022-08-27 RX ADMIN — TRAMADOL HYDROCHLORIDE 50 MG: 50 TABLET, COATED ORAL at 01:59

## 2022-08-27 RX ADMIN — ACETAMINOPHEN 1000 MG: 500 TABLET ORAL at 08:31

## 2022-08-27 RX ADMIN — SODIUM CHLORIDE, PRESERVATIVE FREE 10 ML: 5 INJECTION INTRAVENOUS at 08:32

## 2022-08-27 RX ADMIN — IBUPROFEN 400 MG: 200 TABLET, FILM COATED ORAL at 16:08

## 2022-08-27 RX ADMIN — METHOCARBAMOL 500 MG: 500 TABLET ORAL at 16:08

## 2022-08-27 RX ADMIN — METHOCARBAMOL 500 MG: 500 TABLET ORAL at 08:31

## 2022-08-27 RX ADMIN — MAGNESIUM SULFATE HEPTAHYDRATE 2000 MG: 40 INJECTION, SOLUTION INTRAVENOUS at 13:02

## 2022-08-27 RX ADMIN — METHOCARBAMOL 500 MG: 500 TABLET ORAL at 06:04

## 2022-08-27 RX ADMIN — Medication 5 ML: at 00:40

## 2022-08-27 ASSESSMENT — PAIN SCALES - GENERAL
PAINLEVEL_OUTOF10: 5
PAINLEVEL_OUTOF10: 0
PAINLEVEL_OUTOF10: 6
PAINLEVEL_OUTOF10: 5
PAINLEVEL_OUTOF10: 4
PAINLEVEL_OUTOF10: 2
PAINLEVEL_OUTOF10: 3

## 2022-08-27 ASSESSMENT — PAIN DESCRIPTION - PAIN TYPE
TYPE: SURGICAL PAIN
TYPE: SURGICAL PAIN

## 2022-08-27 ASSESSMENT — PAIN DESCRIPTION - DESCRIPTORS
DESCRIPTORS: DISCOMFORT
DESCRIPTORS: ACHING

## 2022-08-27 ASSESSMENT — PAIN DESCRIPTION - LOCATION
LOCATION: ABDOMEN

## 2022-08-27 ASSESSMENT — PAIN DESCRIPTION - ORIENTATION: ORIENTATION: MID;LOWER

## 2022-08-27 ASSESSMENT — PAIN DESCRIPTION - FREQUENCY: FREQUENCY: CONTINUOUS

## 2022-08-27 ASSESSMENT — PAIN SCALES - WONG BAKER: WONGBAKER_NUMERICALRESPONSE: 2

## 2022-08-27 NOTE — PLAN OF CARE
Problem: Discharge Planning  Goal: Discharge to home or other facility with appropriate resources  8/27/2022 1655 by Sonia Denson RN  Outcome: Completed  8/27/2022 1424 by Sonia Denson RN  Outcome: Progressing     Problem: Pain  Goal: Verbalizes/displays adequate comfort level or baseline comfort level  8/27/2022 1655 by Sonia Denson RN  Outcome: Completed  8/27/2022 1424 by Sonia Denson RN  Outcome: Progressing     Problem: ABCDS Injury Assessment  Goal: Absence of physical injury  8/27/2022 1655 by Sonia Denson RN  Outcome: Completed  8/27/2022 1424 by Sonia Denson RN  Outcome: Progressing

## 2022-08-27 NOTE — PROGRESS NOTES
Colorectal Surgery   Daily Progress Note  Patient: Pia Jones    CC: Rectal Cancer    SUBJECTIVE:  Patient rested well overnight. Pain is controlled with meds. Pt has allergy to oxy so switched to tramadol, has not taken anything to eat yet. Has not ambulated. Alexandra in place. ROS: A 14 point review of systems was conducted, significant findings as noted above. All other systems negative. OBJECTIVE:   Infusions:   sodium chloride        I/O:I/O last 3 completed shifts: In: 2700 [I.V.:2500; IV Piggyback:200]  Out: 600 [Urine:600]           Wt Readings from Last 1 Encounters:   08/26/22 193 lb 3.2 oz (87.6 kg)       Exam:  /74   Pulse (!) 101   Temp 97.9 °F (36.6 °C) (Oral)   Resp 16   Ht 5' 8\" (1.727 m)   Wt 193 lb 3.2 oz (87.6 kg)   SpO2 94%   BMI 29.38 kg/m²     General Appearance: Alert, in no apparent distress   Neuro: A&Ox3, no focal deficits  Lungs: Normal effort; no adventitious breath sounds  Heart: Regular rate and rhythm  Abdomen: Soft, appropriately-tender to palpation, non-distended; no guarding, no rigidity, no rebound. Incisions C/D/I with dermabond  Extremities: No edema, no cyanosis            LABS:   Recent Labs     08/26/22  1210 08/27/22  0523   WBC 11.7* 16.0*   HGB 15.4 13.4   HCT 45.7 39.4   MCV 85.9 83.8    245        Recent Labs     08/26/22  1210 08/27/22  0523    137   K 4.3 4.5   CL 99 103   CO2 24 20*   PHOS  --  3.0   BUN 13 8   CREATININE 0.9 0.6      No results for input(s): AST, ALT, ALB, BILIDIR, BILITOT, ALKPHOS in the last 72 hours. No results for input(s): LIPASE, AMYLASE in the last 72 hours. No results for input(s): PROT, INR, APTT in the last 72 hours. No results for input(s): CKTOTAL, CKMB, CKMBINDEX, TROPONINI in the last 72 hours. ASSESSMENT/PLAN:   Patient is a 46 y.o. female status post ROBOTIC LOW ANTERIOR RESECTION WITH COLORECTAL ANASTAMOSIS, LAPAROSCOPIC MOBILIZATION OF SPLENIC FLEXURE.  POD1    - Cont gen diet  - OOB ambultate  - IS and deep breathing   -D/c Alexandra today. Will be void check  -Will re-evaluate later today to determine if appropriate for d/c today.       Tenzin Jean-Baptiste DO   PGY1, General Surgery  08/27/22  6:53 AM  Pager # [phone]

## 2022-08-27 NOTE — PROGRESS NOTES
PACU Transfer Note    Vitals:    08/26/22 2345   BP: 120/80   Pulse: (!) 101   Resp: 19   Temp: 97.6 °F (36.4 °C)   SpO2: 98%       In: 451 [I.V.:451]  Out: 550 [Urine:550]    Pain assessment:  adequately treated  Pain Level: 4    Report given to Receiving unit RN.    8/26/2022 11:55 PM

## 2022-08-27 NOTE — PROGRESS NOTES
General Surgery  Post-operative Note    POST-OP DIAGNOSIS: Rectal cancer    PROCEDURE(S): Robotic LAR w/ colorectal anastomosis, mobilization of splenic flexure     SUBJECTIVE:   Patient's pain is well-controlled. Patient tolerating diet, ate mashed potatoes without any issues. OBJECTIVE:  Physical Exam:  Vitals:   Vitals:    08/26/22 2315 08/26/22 2330 08/26/22 2345 08/27/22 0011   BP: 119/80 125/78 120/80 120/75   Pulse: (!) 104 99 (!) 101 (!) 101   Resp: 14 14 19 16   Temp:   97.6 °F (36.4 °C) 96.8 °F (36 °C)   TempSrc:    Oral   SpO2:  98% 98% 96%   Weight:       Height:         General Appearance: Alert, no acute distress  Neuro: A&Ox3, no focal deficits, sensation intact  Chest/Lungs: Normal effort, breathing room air  Cardiovascular: RRR, no murmurs/gallops/rubs  Abdomen: Soft, appropriately tender, non-distended, incisions c/d/i  : Alexandra in place with clear yellow urine  Extremities: no edema, no cyanosis    ASSESSMENT/PLAN:  This is a 46y.o. year old female status post robotic LAR w/ colorectal anastomosis, mobilization of splenic flexure secondary to rectal cancer. POD0.    - Patient recovering well. - No changes to current plan .     Francheska Melgar MD, MPH  PGY-4 General Surgery  09/01/22  10:21 AM

## 2022-08-27 NOTE — DISCHARGE INSTRUCTIONS
Discharge Instructions:    Diet:   You may resume a regular diet. Wound Care:   Skin glue was used to cover your incision(s). Please note that this glue is tinted purple; therefore, a slight purple hue around your incisions is normal. The skin glue will fall off on its own in about 10 days. You may shower, but do not scrub the incision sites directly or soak (tub, pool, etc.). Activity:   No heavy lifting greater than a milk jug (~8lbs) until follow up, at which time you will receive further instruction. Pain management:   Unless informed of any restrictions by your primary care physician, please use your preferred over-the-counter pain reliever as your primary pain medication. If you have pain that persists despite over-the-counter pain medications, you have been provided with a prescription for an opioid/narcotic pain reliever (Tramadol). No driving or operating machinery while taking opioid/narcotic medications. If you are not taking the opioid pain medication, then you can drive when you feel as though you can sit comfortably behind the steering wheel and can slam on the brake or turn the wheel sharply without it hurting. We recommend that you practice this while sitting the car with it parked in your driveway before trying to drive on the road. Reasons to Return:   Some soreness and redness is common after surgery, especially for the first 24-48 hours. If, however, you experience for increasing redness, worsening pain, new and/or increasing drainage from wound, fever above 101.5 degrees Farenheit, bleeding that does not stop soon after discovery, or any other concerns about your incision or post op course, please either call the office or call/return to the Emergency Department for further evaluation. Follow up with Dr. Levon Landrum in 14 days. Please call the office to make an appointment.  [phone]    We recommend that you call your primary care physician within the first 3-5 days following discharge to inform them that you were recently hospitalized and potentially schedule a visit at their discretion.

## 2022-08-27 NOTE — OP NOTE
[place] [address], [address]                                OPERATIVE REPORT    PATIENT NAME: AYSHA LOWE                 :        1970  MED REC NO:   [id number]                          ROOM:       6302  ACCOUNT NO:   [de-identified]                           ADMIT DATE: 2022  PROVIDER:     Eluterio Molt. Verner Robert, MD    DATE OF PROCEDURE:  2022    SURGEON:  Leonardo Valle MD    ASSISTANT:  Kelvin Jansen, PGY-4, resident. PREOPERATIVE DIAGNOSIS:  Mid rectal cancer. POSTOPERATIVE DIAGNOSIS:  Mid rectal cancer. PROCEDURES:  1.  Robotic-assisted laparoscopic low anterior resection with colorectal  anastomosis. 2.  Robotic-assisted laparoscopic mobilization of splenic flexure. ANESTHESIA:  General endotracheal.    COMPLICATIONS:  None. SPECIMEN:  Low anterior resection. ESTIMATED BLOOD LOSS:  Minimum. INDICATIONS:  The patient is a 77-year-old female. She presented to me  with a new diagnosis of mid rectal mass. Biopsy showed adenocarcinoma. Her staging was bit discordant with MRI showing a T3N0M0 lesion, whereas  indirect ultrasound showed T2N0M0. She was discussing multidisciplinary  tumor board and multiple opinions were obtained. I had a very long  discussion with the patient. I gave her the options of neoadjuvant  treatment followed by surgery versus outcome of surgery and then plans  for adjuvant if need be. Discussed with her the discordant results and  she decided to proceed with an up-front low anterior resection. Discussed the robotic laparoscopic techniques and discussed the risks of  surgery including but not limited to bleeding, infection, anastomotic  leak, hernia, need for additional operations, damage to intra-abdominal  structures including but not limited to bowel, bladder, ureters as well  as neurovascular structures.   She understood the potential need for an  open operation and potential colostomy. She understood potential need  for adjuvant therapy and may slightly higher risk that we could be  understaging her and that she should proceed neoadjuvant chemo and/or  radiation. She understood all the risks and agreed to proceed. PROCEDURE DETAILS:  The patient was brought to the operating theater,  placed supine on the operating table. General endotracheal intubation  was performed by Anesthesiology. The patient was placed in the  lithotomy position. Alexandra catheter was placed revealing clear yellow  urine. Her rectum was washed out using Betadine and saline solution. Her abdomen was prepped and draped in the usual sterile fashion using  chlorhexidine prep solution. Timeout was performed confirming the  patient's identity as well as the operative site. Antibiotics were  confirmed to be perfusing. All safety points were followed. SCDs were  on and functioning. Lovenox was confirmed given. I began by making an incision in the left upper quadrant midclavicular  line at Garcia's point. A 0-degree 5 mm laparoscope was used to enter  the abdominal cavity in under direct Optiview fashion. The abdomen was  then entered without complication and insufflated to 15 mmHg. We then  explored the abdomen laparoscopically and noted no evidence of  carcinomatosis or metastatic disease grossly. Additional ports had been  placed including a right lower quadrant 12 mm stapler robotic port. A 8  mm robotic ports x2 in the original left upper quadrant and two ports  were then created with 8 mm robotic port. A 5 mm assistant port was  placed in the right upper quadrant as well. The patient was placed in  head down and left side up position. Small bowel was then brought out  of the pelvis. The sellpoints robot was then docked in the usual  sterile fashion. I began laterally taking down the congenital adhesions in the mid and  distal sigmoid colon.   I entered the to 5 cm distal margin past that area. I  circumferentially dissected around the distal rectum and used a 60-mm  blue staple load x1.5 robotically on the distal rectum about 3 to 4 cm  from the anal verge. This specimen was then brought out of the pelvis. The pelvis was then irrigated and noted to be hemostatic. I then  continued mobilizing the proximal colon. I did note that given the  fairly low distal transection, we would need to mobilize the splenic  flexure. I continued lateral to medially off the Gerota's fascia until  I reached splenocolic ligament, which was then taken down using cautery  as well as the omentocolic ligament to the distal transverse colon. With additional mobilization of the splenic flexure, I was noted the  colon did come down to the pelvis tension free for anastomosis. ICG  angiography was then used to evaluate the blood supply. There was noted  to be good blood flow to the proximal colon which will make for a nice  healthy anastomosis. The robot was then undocked and we continued laparoscopically. Laparoscopically, we then grafted the specimen as well as proximal  colon. I made a 2-inch Pfannenstiel incision in the lower abdomen of  her previous scar. I dissected the subcutaneous tissue down the fascia. Fascia was incised in a transverse fashion. The rectus muscle was then  spread in the midline. I entered through the peritoneum. I bluntly  opened this area until was able to place a medium-sized wound protector. The specimen was barely able to be brought through the wound protector  site. It was followed up on the back table and photo documentation was  performed for rectal cancer and cauterization program protocol and would  be sent down and labeled as low anterior resection for primary  examination. I did open the specimen, noted very good distal margin at  least 4 cm and I deemed we could obtain another centimeter or so with  the anastomotic ring.     I then scrubbed in, packed in and repaired the proximal colon. I used a  pursestring device just proximal to staple line. Staple was excised. EEA 29 stapler was then opened and the Anvil was placed into the  colotomy and secured down using Prolene suture. Fibrofatty tissue was  cleared from around the post and the post was then placed back into the  abdominal cavity. The wound protector top was then placed and the  patient was placed back in the head down positioning, left side up  positioning. Small bowel was again brought out of pelvis and noted good  hemostasis at the pelvis. Flexible sigmoidoscopy was then performed to  confirm the intact rectus on the staple line with saline in the pelvis. I then advanced the EEA stapler transanally. A spike was deployed just  adjacent to the staple line. The two ends of the stapler were then  brought together and secured down. I ensured that there was no  twisting. The mesentery laid flat and there was no twisting whatsoever. The tinea laid anteriorly. Once I was very happy with the orientation,  I noted there was no evidence of pulling or injury to the vagina. The  stapler was then fired creating a 29-mm end-to-end colorectal  anastomosis. The staple was then removed. I noted intact anastomotic  rings x2. The distal ring will be sent and labeled as distal  anastomotic ring for permanent examination. The flexible sigmoidoscopy  was then performed for leak test.  I noted no leak whatsoever with  saline in the pelvis and the endoscopic evaluation and insufflation  intraluminally. I noted pink healthy mucosa on both sides of the  anastomosis and the staple line appeared to be well formed  circumferentially. I was very happy with how it looked. The colon was  then desufflated. All the irrigant fluid was then suctioned out.   Given  the fact that she had not had previous radiation or chemo and she was  otherwise fairly healthy and a nonsmoker, I decided she will not need a  diverting ileostomy. I laparoscopically ensured that there was no  tension on the anastomosis. All the small bowel was then brought to the  right side of the abdomen. All the irrigation fluid was suctioned out. I was happy with how this looked. The right lower quadrant 12-mm port  was then removed. 0 Vicryl suture passer was then used to close the  fascia. The wound protector was then removed and 0 PDS running suture  was then used to close the anterior fascia. We went back in  laparoscopically one more time to ensure good _____ closure in both  these areas and the laparoscopic robotic ports were then removed under  direct visualization. No bleeding noted at the abdominal wall. The  abdomen was desufflated. All the incisions were irrigated and closed  using a combination of 3-0 Vicryl, 4-0 Monocryl and skin glue. The patient tolerated the procedure well. She was then extubated and  brought to the PACU after undergoing TAP block by Anesthesia. The  patient's family was updated on the operative findings. All counts were  correct. No complications. RECTAL CANCER STANDARD OPERATIVE NOTE     Cami Williamson  1970  [id number]      REQUIRED STANDARD ELEMENTS:  1. ASA Score: 2  2. Case Status: elective (elective vs urgent - if urgent, why?)  3. Operation performed: LAR  4. Modality: robotic/lap  5. Location in rectum: middle  6. Height of lower edge of tumor from anal verge: 9 cm  7. Mobilization of Splenic Flexure: YES  8. Level of Arterial Ligation: GALE  9. Level of Venous Ligation: high  10. Level of Distal transection from anal verge: 4 cm  11. Type of reconstruction: stapled EEA  12. Anastomotic testing method: flex sig  13. Creation of stoma: none  14. En bloc resection of other organs: none  15. Metastectomy: no  16. Completeness of resection: R0  17. Intraoperative complications: none  18. Blood transfusion: no  19.  TME photographed: yes  20: Narrative: see above    SAHIL RESTREPO

## 2022-08-27 NOTE — PROGRESS NOTES
Pt and family member given discharge instructions. Good understanding. 2 prescriptions sent with pt. Pt and family member verbalized understanding of discharge instructions.

## 2022-08-27 NOTE — PROGRESS NOTES
Patient alert and oriented. VSS Patient c/o pain , tramadol given. CDI surgical sites HERRERA. Assessment done. see flowsheet. Alexandra removed @ 5410. Patient in bed lowest position call light and bedside table within reach. All needs are met at this time. Patient aware to call if any help needed. Will continue to monitor

## 2022-08-29 ENCOUNTER — CARE COORDINATION (OUTPATIENT)
Dept: OTHER | Facility: CLINIC | Age: 52
End: 2022-08-29

## 2022-08-29 NOTE — CARE COORDINATION
Care Transitions Outreach Attempt    Call within 2 business days of discharge: Yes   Attempted to reach patient for transitions of care follow up. Unable to reach patient. Patient: Larissa Nassar Patient : 1970 MRN: [id number]    Last Discharge [employer]       Date Complaint Diagnosis Description Type Department Provider    22  Rectal cancer [facility]) Admission (Discharged) [address] Jay Conley MD              Was this an external facility discharge?  No Discharge Facility: [facility]    Noted following upcoming appointments from discharge chart review:   [facility] follow up appointment(s):   Future Appointments   Date Time Provider Tres Dominguez   2022 10:30 AM Delicia Chowdary MD [facility]     Non-[facility] follow up appointment(s):

## 2022-08-30 ENCOUNTER — CARE COORDINATION (OUTPATIENT)
Dept: OTHER | Facility: CLINIC | Age: 52
End: 2022-08-30

## 2022-08-30 NOTE — CARE COORDINATION
[address] Transitions Initial Follow Up Call    Call within 2 business days of discharge: Yes    Patient: Harshad Lobato Patient : 1970   MRN: [id number]  Reason for Admission:  robotic low anterior resection with colorectal anastamosis, laparoscopic mobilzation of splenic flexure  Discharge Date: 22 RARS: Readmission Risk Score: 5.4      Last Discharge [facility]       Date Complaint Diagnosis Description Type Department Provider    22  Rectal cancer [facility]) Admission (Discharged) [address], [place]             Transitions of Care Initial Call    Was this an external facility discharge? No Discharge Facility: [facility] to be reviewed by the provider   Additional needs identified to be addressed with provider: No  none             Method of communication with provider : none    Advance Care Planning:   Does patient have an Advance Directive:  on file . Ambulatory Care Manager contacted the patient by telephone to perform post hospital discharge assessment. Verified name and  with patient as identifiers. Provided introduction to self, and explanation of the ACM role. ACM reviewed discharge instructions, medical action plan and red flags with patient who verbalized understanding. Patient given an opportunity to ask questions and does not have any further questions or concerns at this time. Were discharge instructions available to patient? Yes. Reviewed appropriate site of care based on symptoms and resources available to patient including: PCP  Specialist  Benefits related nurse triage line  Urgent care clinics  [facility]   When to call [address] The patient agrees to contact the PCP office for questions related to their healthcare. Medication reconciliation was performed with patient, who verbalizes understanding of administration of home medications.  Advised obtaining a 90-day supply of all daily and as-needed medications. Was patient discharged with a pulse oximeter? no    ACM provided contact information. No further follow-up call indicated based on severity of symptoms and risk factors. Patient states she is doing well. Patient is s/p  robotic low anterior resection with colorectal anastamosis, laparoscopic mobilzation of splenic flexure. Patient states incisions look good and no s/s of infection. Patient resumed a normal diet. Denies n/v/d. Patient states she doesn't really have pain, just experiencing soreness. Taking ibuprofen and robaxin PRN. Patient has f/u appt with Dr. Tamara Crum on 9/13/2022. Patient states she is doing well and has no care management needs at this time. No further outreach scheduled with this ACM, ACM will sign off care team at this time. Episode of Care resolved. Patient has this ACM's contact information if future needs arise.         Care Transitions 24 Hour Call    Schedule Follow Up Appointment with PCP: Completed  Do you have a copy of your discharge instructions?: Yes  Do you have all of your prescriptions and are they filled?: Yes  Have you been contacted by a [address]?: No  Have you scheduled your follow up appointment?: Yes  How are you going to get to your appointment?: Car - family or friend to transport  Do you feel like you have everything you need to keep you well at home?: Yes  Care Transitions Interventions         Follow Up  Future Appointments   Date Time Provider Tres Dominguez   9/13/2022 10:30 AM Razia Bolivar MD [address] Martha Hendrickson RN

## 2022-09-01 NOTE — DISCHARGE INSTRUCTIONS
PATIENT INSTRUCTIONS  POST-SEDATION    Cami Williamson          IMMEDIATELY FOLLOWING PROCEDURE:    Do not drive or operate machinery for the first twenty four hours after surgery. Do not make any important decisions for twenty four hours after surgery or while taking narcotic pain medications or sedatives. You should NOT BE LEFT UNATTENDED OR ALONE. A responsible adult should be with you for the rest of the day of your procedure and also during the night for your protection and safety. You may experience some light headedness. Rest at home with activity as tolerated. You may not need to go to bed, but it is important to rest for the next 24 hours. You should not engage in athletic sports such as basketball, volleyball, jogging, skating, or activities requiring refined motor skills for 24 hours. If you develop intractable nausea and vomiting or a severe headache please notify your doctor immediately. You are not expected to have any fever, but if you feel warm, take your temperature. If you have a fever 101 degrees or higher, call your doctor. If you have had an Endoscopy:   *Eat lightly for your first meal and gradually resume your normal / prescribed diet. *If you have had a colonoscopy, do not expect a normal bowel movement for approximately three days due to the cleansing of the large intestine prior to colonoscopy. ONCE YOU ARE HOME, IF YOU SHOULD HAVE:  Difficulty in breathing, persistent nausea or vomiting, bleeding you feel is excessive, or pain that is unusual, increased abdominal bloating, or any swelling, fever / chills, call your physician. If you cannot contact your physician, but feel that your signs and symptoms need a physician's attention, go to the Emergency Department. FOLLOW-UP:    Please follow up with @PCP@ as scheduled or needed. Dr. Gely Armstrong DO will call you with the biopsy findings. Call Dr. Gely Armstrong DO if there are any GI concerns. [phone]    Repeat Colonoscopy ***    You may be receiving a follow up phone call to ask about your care. Colonoscopy: What to Expect at [facility]  After you have a colonoscopy, you will stay at the clinic for 1 to 2 hours until the medicines wear off. Then you can go home. But you will need to arrange for a ride. Your doctor will tell you when you can eat and do your other usual activities. Your doctor will talk to you about when you will need your next colonoscopy. Your doctor can help you decide how often you need to be checked. This will depend on the results of your test and your risk for colorectal cancer. After the test, you may be bloated or have gas pains. You may need to pass gas. If a biopsy was done or a polyp was removed, you may have streaks of blood in your stool (feces) for a few days. Problems such as heavy rectal bleeding may not occur until several weeks after the test. This isn't common. But it can happen after polyps are removed. This care sheet gives you a general idea about how long it will take for you to recover. But each person recovers at a different pace. Follow the steps below to get better as quickly as possible. How can you care for yourself at home? Activity    Rest when you feel tired. You can do your normal activities when it feels okay to do so. Diet    Follow your doctor's directions for eating. Unless your doctor has told you not to, drink plenty of fluids. This helps to replace the fluids that were lost during the colon prep. Do not drink alcohol. Medicines    Your doctor will tell you if and when you can restart your medicines. He or she will also give you instructions about taking any new medicines. If you take blood thinners, such as warfarin (Coumadin), clopidogrel (Plavix), or aspirin, be sure to talk to your doctor. He or she will tell you if and when to start taking those medicines again.  Make sure that you understand exactly what your doctor wants you to do. If polyps were removed or a biopsy was done during the test, your doctor may tell you not to take aspirin or other anti-inflammatory medicines for a few days. These include ibuprofen (Advil, Motrin) and naproxen (Aleve). Other instructions    For your safety, do not drive or operate machinery until the medicine wears off and you can think clearly. Your doctor may tell you not to drive or operate machinery until the day after your test.     Do not sign legal documents or make major decisions until the medicine wears off and you can think clearly. The anesthesia can make it hard for you to fully understand what you are agreeing to. Follow-up care is a key part of your treatment and safety. Be sure to make and go to all appointments, and call your doctor if you are having problems. It's also a good idea to know your test results and keep a list of the medicines you take. When should you call for help? Call 911 anytime you think you may need emergency care. For example, call if:    You passed out (lost consciousness). You pass maroon or bloody stools. You have trouble breathing. Call your doctor now or seek immediate medical care if:    You have pain that does not get better after you take pain medicine. You are sick to your stomach or cannot drink fluids. You have new or worse belly pain. You have blood in your stools. You have a fever. You cannot pass stools or gas. Watch closely for changes in your health, and be sure to contact your doctor if you have any problems. Where can you learn more? Go to https://[employer]. org and sign in to your [employer] account. Enter E264 in the [employer] box to learn more about \"Colonoscopy: What to Expect at Home. \"     If you do not have an account, please click on the \"Sign Up Now\" link. Current as of:  May 12, 2017  Content Version: 11.6  © 3934-1174 Healthwise, Incorporated. Care instructions adapted under license by [facility] ([facility]). If you have questions about a medical condition or this instruction, always ask your healthcare professional. [address] any warranty or liability for your use of this information. Attending Only

## 2022-09-10 ENCOUNTER — CLINICAL DOCUMENTATION (OUTPATIENT)
Dept: SURGERY | Age: 52
End: 2022-09-10

## 2022-09-10 NOTE — Clinical Note
Rectal cancer tumor board post surgical discussion summary. I will arrange portacath in next few weeks. Thanks!  Noa Maldonado

## 2022-09-12 ENCOUNTER — TELEPHONE (OUTPATIENT)
Dept: SURGERY | Age: 52
End: 2022-09-12

## 2022-09-12 NOTE — TELEPHONE ENCOUNTER
Patient has been scheduled for:    Procedure: Insert Port  Date: 9/19/22  Time: 7:15AM  Arrival: 5:30AM  [facility]   [facility]?:  Prep? npo    Pre-op? Post-op Appt? Patient advised they will need a . Orders routed to surgery scheduling. Instructions have been mailed/emailed to:  [email or web address]

## 2022-09-13 ENCOUNTER — OFFICE VISIT (OUTPATIENT)
Dept: SURGERY | Age: 52
End: 2022-09-13

## 2022-09-13 VITALS
BODY MASS INDEX: 30.01 KG/M2 | WEIGHT: 198 LBS | HEART RATE: 87 BPM | HEIGHT: 68 IN | SYSTOLIC BLOOD PRESSURE: 134 MMHG | TEMPERATURE: 97.9 F | OXYGEN SATURATION: 95 % | DIASTOLIC BLOOD PRESSURE: 87 MMHG

## 2022-09-13 DIAGNOSIS — C20 RECTAL CANCER (HCC): Primary | ICD-10-CM

## 2022-09-13 PROCEDURE — 99024 POSTOP FOLLOW-UP VISIT: CPT | Performed by: SURGERY

## 2022-09-13 NOTE — PROGRESS NOTES
[facility] COLORECTAL SURGERY  [address]  Dept: [phone]  Dept Fax: [phone]  Loc: [phone]    Visit Date: 9/13/2022    Alicia Castro is a 46 y.o. female who presents today for: Post-Op Check (Mid rectal cancer  8-26-22)      Subjective:     Alicia Castro is a 46 y.o. female here for postoperative visit after robotic low anterior resection for mid rectal cancer about 2 weeks ago. Overall doing great. Only complaint is some perianal irritation for which she has been using ointment    Patient's problem list, medications, past medical, surgical, family, and social histories were reviewed and updated in the chart as indicated today. Objective:     /87   Pulse 87   Temp 97.9 °F (36.6 °C) (Infrared)   Ht 5' 8\" (1.727 m)   Wt 198 lb (89.8 kg)   SpO2 95%   BMI 30.11 kg/m²     Abdominal/wound: Incisions healing great, no signs of infection    Assessment/Plan:       ASSESSMENT/PLAN:    2-week status post robotic low anterior resection for rectal cancer. Discussed pathology showing T3N0 disease. She is already seen oncology and is set up for Port-A-Cath placement with me next week followed by adjuvant chemotherapy in the next few weeks. Okay to use Calmoseptine or other over-the-counter ointment for perianal irritation. Suggested fiber bulking as well to help with stools. DISPOSITION: Next week Port-A-Cath    Note completed using dictation software, please excuse any errors. Referring/primary care physician updated through [employer] note if PCP was listed.     Electronically signed by Walter Mcintyre MD on 9/13/2022 at 10:42 AM

## 2022-09-15 NOTE — PROGRESS NOTES
instructions to follow. If you did not receive these, call your surgeon's office immediately. 5. MEDICATIONS:  Take the following medications with a SMALL sip of water:   Use your usual dose of inhalers the morning of surgery. BRING your rescue inhaler with you to hospital.   Anesthesia does NOT want you to take insulin the morning of surgery. They will control your blood sugar while you are at the hospital. Please contact your ordering physician for instructions regarding your insulin the night before your procedure. If you have an insulin pump, please keep it set on basal rate. Bariatric patient's call your surgeon if on diabetic medications as some may need to be stopped 1 week prior to surgery    6. Do not swallow additional water when brushing teeth. No gum, candy, mints, or ice chips. Refrain from smoking or at least decrease the amount on day of surgery. 7. Morning of surgery:   Take a shower with an antibacterial soap (i.e., Safeguard or Dial) OR your physician may have instructed you to use Hibiclens. Dress in loose, comfortable clothing appropriate for redressing after your procedure. Do not wear jewelry (including body piercings), make-up (especially NO eye make-up), fingernail polish (NO toenail polish if foot/leg surgery), lotion, powders, or metal hairclips. Do not shave or wax for 72 hours prior to procedure near your operative site. Shaving with a razor can irritate your skin and make it easier to develop an infection. On the day of your procedure, any hair that needs to be removed near the surgical site will be 'clipped' by a healthcare worker using a special clipper designed to avoid skin irritation. 8. Dentures, glasses, or contacts will need to be removed before your procedure. Bring cases for your glasses, contacts, dentures, or hearing aids to protect them while you are in surgery. 9. If you use a CPAP, please bring it with you on the day of your procedure.     10. We recommend that valuable personal belongings such as cash, cell phones, e-tablets, or jewelry, be left at home during your stay. The hospital will not be responsible for valuables that are not secured in the hospital safe. However, if your insurance requires a co-pay, you may want to bring a method of payment, i.e., Check or credit card, if you wish to pay your co-pay the day of surgery. 11. If you are to stay overnight, you may bring a bag with personal items. Please have any large items you may need brought in by your family after your arrival to your hospital room. 12. If you have a Living Will or Durable Power of , please bring a copy on the day of your procedure. How we keep you safe and work to prevent surgical site infections:   1. Health care workers should always check your ID bracelet to verify your name and birth date. You will be asked many times to state your name, date of birth, and allergies. 2. Health care workers should always clean their hands with soap or alcohol gel before providing care to you. It is okay to ask anyone if they cleaned their hands before they touch you. 3. You will be actively involved in verifying the type of procedure you are having and ensuring the correct surgical site. This will be confirmed multiple times prior to your procedure. Do NOT elizabeth your surgery site UNLESS instructed to by your surgeon. 4. When you are in the operating room, your surgical site will be cleansed with a special soap, and in most cases, you will be given an antibiotic before the surgery begins. What to expect AFTER your procedure? 1. Immediately following your procedure, your will be taken to the PACU for the first phase of your recovery. Your nurse will help you recover from any potential side effects of anesthesia, such as extreme drowsiness, changes in your vital signs or breathing patterns.  Nausea, headache, muscle aches, or sore throat may also occur after anesthesia. Your nurse will help you manage these potential side effects. 2. For comfort and safety, arrange to have someone at home with you for the first 24 hours after discharge. 3. You and your family will be given written instructions about your diet, activity, dressing care, medications, and return visits. 4. Once at home, should issues with nausea, pain, or bleeding occur, or should you notice any signs of infection, you should call your surgeon. 5. Always clean your hands before and after caring for your wound. Do not let your family touch your surgery site without cleaning their hands. 6. Narcotic pain medications can cause significant constipation. You may want to add a stool softener to your postoperative medication schedule or speak to your surgeon on how best to manage this SIDE EFFECT. SPECIAL INSTRUCTIONS     Thank you for allowing us to care for you. We strive to exceed your expectations in the delivery of care and service provided to you and your family. If you need to contact the [place] [address] staff for any reason, please call us at [phone]    Instructions reviewed with patient during preadmission testing phone interview.   Evy Martinez RN.9/15/2022 .2:39 PM      ADDITIONAL EDUCATIONAL INFORMATION REVIEWED PER PHONE WITH YOU AND/OR YOUR FAMILY:  No Hibiclens® Bathing Instructions   Yes Antibacterial Soap

## 2022-09-15 NOTE — PROGRESS NOTES
Place patient label inside box (if no patient label, complete below)  Name:  :  MR#:     Rachna Mahan / PROCEDURE  I (we), AYSHA LOWE  authorize DR Harsha Canela  and/or such assistants as may be selected by him/her, to perform the following operation/procedure(s): INSERT PORT       Note: If unable to obtain consent prior to an emergent procedure, document the emergent reason in the medical record. This procedure has been explained to my (our) satisfaction and included in the explanation was: The intended benefit, nature, and extent of the procedure to be performed; The significant risks involved and the probability of success; Alternative procedures and methods of treatment; The dangers and probable consequences of such alternatives (including no procedure or treatment); The expected consequences of the procedure on my future health; Whether other qualified individuals would be performing important surgical tasks and/or whether  would be present to advise or support the procedure. I (we) understand that there are other risks of infection and other serious complications in the pre-operative/procedural and postoperative/procedural stages of my (our) care. I (we) have asked all of the questions which I (we) thought were important in deciding whether or not to undergo treatment or diagnosis. These questions have been answered to my (our) satisfaction. I (we) understand that no assurance can be given that the procedure will be a success, and no guarantee or warranty of success has been given to me (us). It has been explained to me (us) that during the course of the operation/procedure, unforeseen conditions may be revealed that necessitate extension of the original procedure(s) or different procedure(s) than those set forth in Paragraph 1.  I (we) authorize and request that the above-named physician, his/her assistants or his/her designees, perform procedures as necessary and desirable if deemed to be in my (our) best interest.     Revised 8/2/2021                                                                          Page 1 of 2           I acknowledge that health care personnel may be observing this procedure for the purpose of medical education or other specified purposes as may be necessary as requested and/or approved by my (our) physician. I (we) consent to the disposal by the hospital Pathologist of the removed tissue, parts or organs in accordance with hospital policy. I do ____ do not ____ consent to the use of a local infiltration pain blocking agent that will be used by my provider/surgical provider to help alleviate pain during my procedure. I do ____ do not ____ consent to an emergent blood transfusion in the case of a life-threatening situation that requires blood components to be administered. This consent is valid for 24 hours from the beginning of the procedure. This patient does ____ or does not ____ currently have a DNR status/order. If DNR order is in place, obtain Addendum to the Surgical Consent for ALL Patients with a DNR Order to address zee-operative status for limited intervention or DNR suspension.      I have read and fully understand the above Consent for Operation/Procedure and that all blanks were completed before I signed the consent.   _____________________________       _____________________      ____/____am/pm  Signature of Patient or legal representative      Printed Name / Relationship            Date / Time   ____________________________       _____________________      ____/____am/pm  Witness to Signature                                    Printed Name                    Date / Time    If patient is unable to sign or is a minor, complete the following)  Patient is a minor, ____ years of age, or unable to sign because: ______________________________________________________________________________________________    If a phone consent is obtained, consent will be documented by using two health care professionals, each affirming that the consenting party has no questions and gives consent for the procedure discussed with the physician/provider.   _____________________          ____________________       _____/_____am/pm   2nd witness to phone consent        Printed name           Date / Time    Informed Consent:  I have provided the explanation described above in section 1 to the patient and/or legal representative.  I have provided the patient and/or legal representative with an opportunity to ask any questions about the proposed operation/procedure.   ___________________________          ____________________         ____/____am/pm  Provider / Proceduralist                            Printed name            Date / Time  Revised 8/2/2021                                                                      Page 2 of 2

## 2022-09-16 ENCOUNTER — ANESTHESIA EVENT (OUTPATIENT)
Dept: OPERATING ROOM | Age: 52
End: 2022-09-16
Payer: COMMERCIAL

## 2022-09-16 ENCOUNTER — TELEPHONE (OUTPATIENT)
Dept: SURGERY | Age: 52
End: 2022-09-16

## 2022-09-16 NOTE — DISCHARGE SUMMARY
Discharge Summary      Patient:  Allan Felipe Date: 8/26/2022 10:58 AM    Discharge Date: 8/27/2022  4:55 PM    Admitting Physician: Sofía Dorsey MD     Discharge Physician: same    Admitting Diagnosis:  Rectal cancer    Discharge Diagnosis: same     Past Medical History:   Diagnosis Date    colorectal 07/19/2022    Cornea abrasion 2020    cornea tear     Migraines     uses imitrex as needed    PONV (postoperative nausea and vomiting)         Indication for Admission:   Patient presented for scheduled elected surgery and was admitted for post op care. Hospital Course:   Ryan Sen presented to [facility] for the elective surgery with Dr. Kelly Couch for tx of her rectal cancer on 8/26/22. On POD #1, the pt's farfan was removed, her diet advanced and tolerated. She was deemed appropriate for d/c    Procedures:   Robotic-assisted laparoscopic low anterior resection with colorectal and mobilization of the splenic flexure. (8/26/22)    Consulting services:  None    Discharge physical exam:     General Appearance: Alert, in no apparent distress   Neuro: A&Ox3, no focal deficits  Lungs: Normal effort; no adventitious breath sounds  Heart: Regular rate and rhythm  Abdomen: Soft, appropriately-tender to palpation, non-distended; no guarding, no rigidity, no rebound.  Incisions C/D/I with dermabond  Extremities: No edema, no cyanosis    Disposition:  home    Condition at discharge:  Stable    Discharge Instructions:  See separate form    Patient Instructions:      Medication List        CONTINUE taking these medications      SUMAtriptan 100 MG tablet  Commonly known as: IMITREX            STOP taking these medications      metroNIDAZOLE 500 MG tablet  Commonly known as: Flagyl     neomycin 500 MG tablet  Commonly known as: MYCIFRADIN     ondansetron 4 MG disintegrating tablet  Commonly known as: Zofran ODT            ASK your doctor about these medications      methocarbamol 500 MG tablet  Commonly known

## 2022-09-19 ENCOUNTER — APPOINTMENT (OUTPATIENT)
Dept: GENERAL RADIOLOGY | Age: 52
End: 2022-09-19
Attending: SURGERY
Payer: COMMERCIAL

## 2022-09-19 ENCOUNTER — HOSPITAL ENCOUNTER (OUTPATIENT)
Age: 52
Setting detail: OUTPATIENT SURGERY
Discharge: HOME | End: 2022-09-19
Attending: SURGERY | Admitting: SURGERY
Payer: COMMERCIAL

## 2022-09-19 ENCOUNTER — ANESTHESIA (OUTPATIENT)
Dept: OPERATING ROOM | Age: 52
End: 2022-09-19
Payer: COMMERCIAL

## 2022-09-19 VITALS
TEMPERATURE: 97.9 F | OXYGEN SATURATION: 98 % | BODY MASS INDEX: 30.01 KG/M2 | DIASTOLIC BLOOD PRESSURE: 70 MMHG | SYSTOLIC BLOOD PRESSURE: 110 MMHG | RESPIRATION RATE: 15 BRPM | HEART RATE: 70 BPM | WEIGHT: 198 LBS | HEIGHT: 68 IN

## 2022-09-19 PROCEDURE — 3600000002 HC SURGERY LEVEL 2 BASE: Performed by: SURGERY

## 2022-09-19 PROCEDURE — 7100000001 HC PACU RECOVERY - ADDTL 15 MIN: Performed by: SURGERY

## 2022-09-19 PROCEDURE — 2580000003 HC RX 258: Performed by: SURGERY

## 2022-09-19 PROCEDURE — 77001 FLUOROGUIDE FOR VEIN DEVICE: CPT

## 2022-09-19 PROCEDURE — 2709999900 HC NON-CHARGEABLE SUPPLY: Performed by: SURGERY

## 2022-09-19 PROCEDURE — 36561 INSERT TUNNELED CV CATH: CPT | Performed by: SURGERY

## 2022-09-19 PROCEDURE — 3700000000 HC ANESTHESIA ATTENDED CARE: Performed by: SURGERY

## 2022-09-19 PROCEDURE — C1788 PORT, INDWELLING, IMP: HCPCS | Performed by: SURGERY

## 2022-09-19 PROCEDURE — 3600000012 HC SURGERY LEVEL 2 ADDTL 15MIN: Performed by: SURGERY

## 2022-09-19 PROCEDURE — 7100000000 HC PACU RECOVERY - FIRST 15 MIN: Performed by: SURGERY

## 2022-09-19 PROCEDURE — 71045 X-RAY EXAM CHEST 1 VIEW: CPT

## 2022-09-19 PROCEDURE — 2580000003 HC RX 258: Performed by: ANESTHESIOLOGY

## 2022-09-19 PROCEDURE — 7100000011 HC PHASE II RECOVERY - ADDTL 15 MIN: Performed by: SURGERY

## 2022-09-19 PROCEDURE — 6360000002 HC RX W HCPCS: Performed by: SURGERY

## 2022-09-19 PROCEDURE — 3700000001 HC ADD 15 MINUTES (ANESTHESIA): Performed by: SURGERY

## 2022-09-19 PROCEDURE — 77001 FLUOROGUIDE FOR VEIN DEVICE: CPT | Performed by: SURGERY

## 2022-09-19 PROCEDURE — 2500000003 HC RX 250 WO HCPCS: Performed by: SURGERY

## 2022-09-19 PROCEDURE — 7100000010 HC PHASE II RECOVERY - FIRST 15 MIN: Performed by: SURGERY

## 2022-09-19 PROCEDURE — 6360000002 HC RX W HCPCS

## 2022-09-19 PROCEDURE — A4217 STERILE WATER/SALINE, 500 ML: HCPCS | Performed by: SURGERY

## 2022-09-19 DEVICE — PORT INFUS SGL LUMN ATTCH POLYUR OPN END CATH 8FR POWERPRT: Type: IMPLANTABLE DEVICE | Site: CHEST | Status: FUNCTIONAL

## 2022-09-19 RX ORDER — LABETALOL HYDROCHLORIDE 5 MG/ML
10 INJECTION, SOLUTION INTRAVENOUS
Status: DISCONTINUED | OUTPATIENT
Start: 2022-09-19 | End: 2022-09-19 | Stop reason: HOSPADM

## 2022-09-19 RX ORDER — SODIUM CHLORIDE 9 MG/ML
INJECTION, SOLUTION INTRAVENOUS PRN
Status: DISCONTINUED | OUTPATIENT
Start: 2022-09-19 | End: 2022-09-19 | Stop reason: HOSPADM

## 2022-09-19 RX ORDER — FENTANYL CITRATE 50 UG/ML
INJECTION, SOLUTION INTRAMUSCULAR; INTRAVENOUS PRN
Status: DISCONTINUED | OUTPATIENT
Start: 2022-09-19 | End: 2022-09-19 | Stop reason: SDUPTHER

## 2022-09-19 RX ORDER — MEPERIDINE HYDROCHLORIDE 25 MG/ML
12.5 INJECTION INTRAMUSCULAR; INTRAVENOUS; SUBCUTANEOUS EVERY 5 MIN PRN
Status: DISCONTINUED | OUTPATIENT
Start: 2022-09-19 | End: 2022-09-19 | Stop reason: HOSPADM

## 2022-09-19 RX ORDER — SODIUM CHLORIDE 0.9 % (FLUSH) 0.9 %
5-40 SYRINGE (ML) INJECTION PRN
Status: DISCONTINUED | OUTPATIENT
Start: 2022-09-19 | End: 2022-09-19 | Stop reason: HOSPADM

## 2022-09-19 RX ORDER — OXYCODONE HYDROCHLORIDE 5 MG/1
5 TABLET ORAL
Status: DISCONTINUED | OUTPATIENT
Start: 2022-09-19 | End: 2022-09-19 | Stop reason: HOSPADM

## 2022-09-19 RX ORDER — HYDRALAZINE HYDROCHLORIDE 20 MG/ML
10 INJECTION INTRAMUSCULAR; INTRAVENOUS
Status: DISCONTINUED | OUTPATIENT
Start: 2022-09-19 | End: 2022-09-19 | Stop reason: HOSPADM

## 2022-09-19 RX ORDER — SODIUM CHLORIDE 0.9 % (FLUSH) 0.9 %
5-40 SYRINGE (ML) INJECTION EVERY 12 HOURS SCHEDULED
Status: DISCONTINUED | OUTPATIENT
Start: 2022-09-19 | End: 2022-09-19 | Stop reason: HOSPADM

## 2022-09-19 RX ORDER — PROPOFOL 10 MG/ML
INJECTION, EMULSION INTRAVENOUS PRN
Status: DISCONTINUED | OUTPATIENT
Start: 2022-09-19 | End: 2022-09-19 | Stop reason: SDUPTHER

## 2022-09-19 RX ORDER — MAGNESIUM HYDROXIDE 1200 MG/15ML
LIQUID ORAL CONTINUOUS PRN
Status: DISCONTINUED | OUTPATIENT
Start: 2022-09-19 | End: 2022-09-19 | Stop reason: HOSPADM

## 2022-09-19 RX ORDER — TRAMADOL HYDROCHLORIDE 50 MG/1
50 TABLET ORAL EVERY 6 HOURS PRN
COMMUNITY

## 2022-09-19 RX ORDER — HEPARIN SODIUM (PORCINE) LOCK FLUSH IV SOLN 100 UNIT/ML 100 UNIT/ML
SOLUTION INTRAVENOUS PRN
Status: DISCONTINUED | OUTPATIENT
Start: 2022-09-19 | End: 2022-09-19 | Stop reason: ALTCHOICE

## 2022-09-19 RX ORDER — ONDANSETRON 2 MG/ML
4 INJECTION INTRAMUSCULAR; INTRAVENOUS
Status: DISCONTINUED | OUTPATIENT
Start: 2022-09-19 | End: 2022-09-19 | Stop reason: HOSPADM

## 2022-09-19 RX ORDER — PROPOFOL 10 MG/ML
INJECTION, EMULSION INTRAVENOUS CONTINUOUS PRN
Status: DISCONTINUED | OUTPATIENT
Start: 2022-09-19 | End: 2022-09-19 | Stop reason: SDUPTHER

## 2022-09-19 RX ORDER — SODIUM CHLORIDE, SODIUM LACTATE, POTASSIUM CHLORIDE, CALCIUM CHLORIDE 600; 310; 30; 20 MG/100ML; MG/100ML; MG/100ML; MG/100ML
INJECTION, SOLUTION INTRAVENOUS CONTINUOUS
Status: DISCONTINUED | OUTPATIENT
Start: 2022-09-19 | End: 2022-09-19 | Stop reason: HOSPADM

## 2022-09-19 RX ORDER — PROCHLORPERAZINE EDISYLATE 5 MG/ML
5 INJECTION INTRAMUSCULAR; INTRAVENOUS
Status: DISCONTINUED | OUTPATIENT
Start: 2022-09-19 | End: 2022-09-19 | Stop reason: HOSPADM

## 2022-09-19 RX ORDER — FENTANYL CITRATE 50 UG/ML
25 INJECTION, SOLUTION INTRAMUSCULAR; INTRAVENOUS EVERY 5 MIN PRN
Status: DISCONTINUED | OUTPATIENT
Start: 2022-09-19 | End: 2022-09-19 | Stop reason: HOSPADM

## 2022-09-19 RX ORDER — MIDAZOLAM HYDROCHLORIDE 1 MG/ML
INJECTION INTRAMUSCULAR; INTRAVENOUS PRN
Status: DISCONTINUED | OUTPATIENT
Start: 2022-09-19 | End: 2022-09-19 | Stop reason: SDUPTHER

## 2022-09-19 RX ORDER — BUPIVACAINE HYDROCHLORIDE 5 MG/ML
INJECTION, SOLUTION EPIDURAL; INTRACAUDAL PRN
Status: DISCONTINUED | OUTPATIENT
Start: 2022-09-19 | End: 2022-09-19 | Stop reason: ALTCHOICE

## 2022-09-19 RX ADMIN — PROPOFOL 40 MG: 10 INJECTION, EMULSION INTRAVENOUS at 07:23

## 2022-09-19 RX ADMIN — FENTANYL CITRATE 50 MCG: 50 INJECTION, SOLUTION INTRAMUSCULAR; INTRAVENOUS at 07:20

## 2022-09-19 RX ADMIN — Medication 100 MG: at 07:20

## 2022-09-19 RX ADMIN — FENTANYL CITRATE 25 MCG: 50 INJECTION, SOLUTION INTRAMUSCULAR; INTRAVENOUS at 07:42

## 2022-09-19 RX ADMIN — MIDAZOLAM HYDROCHLORIDE 2 MG: 2 INJECTION, SOLUTION INTRAMUSCULAR; INTRAVENOUS at 07:16

## 2022-09-19 RX ADMIN — FENTANYL CITRATE 25 MCG: 50 INJECTION, SOLUTION INTRAMUSCULAR; INTRAVENOUS at 07:29

## 2022-09-19 RX ADMIN — PROPOFOL 100 MCG/KG/MIN: 10 INJECTION, EMULSION INTRAVENOUS at 07:23

## 2022-09-19 RX ADMIN — CEFAZOLIN 2000 MG: 2 INJECTION, POWDER, FOR SOLUTION INTRAMUSCULAR; INTRAVENOUS at 07:30

## 2022-09-19 RX ADMIN — SODIUM CHLORIDE, POTASSIUM CHLORIDE, SODIUM LACTATE AND CALCIUM CHLORIDE: 600; 310; 30; 20 INJECTION, SOLUTION INTRAVENOUS at 06:27

## 2022-09-19 ASSESSMENT — PAIN SCALES - GENERAL
PAINLEVEL_OUTOF10: 0
PAINLEVEL_OUTOF10: 0

## 2022-09-19 ASSESSMENT — PAIN - FUNCTIONAL ASSESSMENT: PAIN_FUNCTIONAL_ASSESSMENT: 0-10

## 2022-09-19 NOTE — DISCHARGE INSTRUCTIONS
Diet:   May resume regular diet    Wound Care:   Surgical grade glue was used on your incision, this will aid in the healing of your incision. It will peel off on its own within 10 days. If it does not peel off in 10 days, you may use petroleum jelly to gently remove it. Do not soak or scrub area of incision for 7-10 days. Activity:   No heavy lifting greater than a milk jug, or 8 lbs until follow up. Pain management: For mild to moderate pain you may take over-the-counter Tylenol or Motrin as directed on the packaging. Do not take more than 4000 mg acetaminophen (the active ingredient in tylenol) per day. Return if:   Call/ Return to ED for increased redness, worsening pain, drainage from wound, or fevers greater than 101.5 F. Follow up with Dr. Sahil Lennon in 2 week(s). Please call the office to make an appointment. [address] INSTRUCTIONS    There are potential side effects of anesthesia or sedation you may experience for the first 24 hours. These side effects include:    Confusion or Memory loss, Dizziness, or Delayed Reaction Times   [x]A responsible person should be with you for the next 24 hours. Do not operate any vehicles (automobiles, bicycles, motorcycles) or power tools or machinery for 24 hours. Do not sign any legal documents or make any legal decisions for 24 hours. Do not drink alcohol for 24 hours or while taking narcotic pain medication. Nausea    [x]Start with light diet and progress to your normal diet as you feel like eating. However, if you experience nausea or repeated episodes of vomiting which persist beyond 12-24 hours, notify your physician. Once nausea has passed, remember to keep drinking fluids. Difficulty Passing Urine  [x]Drink extra amounts of fluid today. Notify your physician if you have not urinated within 8 hours after your procedure or you feel uncomfortable.       Irritated Throat from a Breathing Tube  [x]Drink extra amounts of fluid today. Lozenges may help. Muscle Aches  [x]You may experience some generalized body aches as your muscles recover from medications used to relax them during surgery. These will gradually subside. MEDICATION INSTRUCTIONS:  [x]Prescription(S) x   NO New RX to day continue with your current pain medications  When taking pain medications, you may experience the side effect of dizziness or drowsiness. Do not drink alcohol or drive when taking these medications. []Prescription(S) x          Called to Pharmacy Name and location:    [x]Give the list of your medications to your primary care physician on your next visit. Keep your med list updated and carry it with in case of emergencies. [x] Narcotic pain medications can cause the side effect of significant constipation. You may want to add a stool softener to your postoperative medication schedule or speak to your surgeon on how best to manage this side effect. NARCOTIC SAFETY:  Your pain medicine is only for you to take. Safely store your medicines. Store pills up high and out of reach of children and pets. Ensure safety caps are snapped tightly  Keep track of how many pills you have left    Unused medication can be disposed of by taking them to a drop-off box or take-back program that is authorized by the [facility]. Access to a site near you can be found on the [facility] website ([address] [facility] [employer]gov). If you have a CPAP machine, it is very important that you use it daily during all periods of sleep and daytime rest during your recovery at home. Surgery and Anesthesia place a significant amount of stress on your body. Using your CPAP will help keep you safe and lessen the negative effects of that stress. FOLLOW-UP RECOVERY CARE:  [x]Call the office of Dr Brionna Oliva for follow-up appointment and problems    Watch for these possible complications, symptoms, or side effects of anesthesia.   Call need another surgery to treat the infection. What are some of the things that hospitals are doing to prevent SSIs? To prevent SSIs, doctors, nurses and other healthcare providers:   Clean their hands and arms up to their elbows with an antiseptic agent just before the surgery. Clean their hands with soap and water or an alcohol-based hand rub before and after caring for each patient. May remove some of your hair immediately before your surgery using electric clippers if the hair is in the same area where the procedure will occur. They should not shave you with a razor. Wear special hair covers, masks, gowns, and gloves during surgery to keep the surgery area clean. Give you antibiotics before your surgery starts. In most cases, you should get antibiotics within 60 minutes before the surgery starts and the antibiotics should be stopped within 24 hours after surgery. Clean the skin at the site of your surgery with a special soap that kills germs. What can I do to help prevent SSIs? Before your surgery:  Tell your doctor about other medical problems you may have. Health problems such as allergies, diabetes, and obesity could affect your surgery and your treatment. Quit smoking. Patients who smoke get more infections. Talk to your doctor about how you can quit before your surgery. Do not shave near where you will have surgery. Shaving with a razor can irritate your skin and make it easier to develop an infection. At the time of your surgery:  Speak up if someone tries to shave you with a razor before surgery. Ask why you need to be shaved and talk with your surgeon if you have any concerns. Ask if you will get antibiotics before surgery. After your surgery:  Make sure that your healthcare providers clean their hands before examining you, either with soap and water or an alcohol-based hand rub.    IF YOU DO NOT SEE YOUR PROVIDERS CLEAN THEIR HANDS, PLEASE ASK THEM TO DO SO. Family and friends who visit you should not touch the surgical wound or dressings. Family and friends should clean their hands with soap and water or an alcohol-based hand rub before and after visiting you. If you do not see them clean their hands, ask them to clean their hands. What do I need to do when I go home from the hospital?  Before you go home, your doctor nurses should explain everything you need to know about taking care of your wound. Make sure you understand how to care for your wound before you leave the hospital.    Always clean your hands before and after caring for your wound. Before you go home, make sure you know who to contact if you have questions or problems after you get home. If you have any symptoms of an infection, such as redness and pain at the surgery site, drainage, or fever, call your doctor immediately. If you have additional questions, please ask your doctor or nurse. FAQs  (frequently asked questions)  About Catheter-Associated Bloodstream Infections   (also known as Central Line-Associated Bloodstream Infections)    What is a catheter-associated bloodstream infection? A central line or central catheter is a tube that is placed in the neck, chest, arm, or groin. The catheter is often used to draw blood, or give fluids or medications. It may be left in place for several weeks. A bloodstream infection can occur when bacteria or other germs travel down a central line and enter the blood. If you develop a catheter-associated bloodstream infection you may become ill with fevers and chills or the skin around the catheter may become sore and red. Can a catheter-related bloodstream infection be treated? A catheter-associated bloodstream infection is serious, but often can be successfully treated with antibiotics. The catheter might need to be removed if you develop an infection.      What are some of the things that hospitals are doing to prevent catheter-associated bloodstream infections? To prevent catheter-associated bloodstream infections doctors and nurses will:   Choose a vein where the catheter can be safely inserted and where the risk for infection is small. Clean their hands with soap and water or an alcohol-based hand rub before putting in the catheter. Wear a mask, cap sterile gown, and sterile gloves when putting in the catheter to keep it sterile. The patient will be covered with a sterile sheet. Clean the patients skin with an antiseptic solution before using the catheter to draw blood or give medications. Healthcare providers also clean their hands and wear gloves when changing the bandage that covers the area where the catheter enters the skin. Decide every day if the patient still needs to have the catheter. The catheter will be removed as soon as it is no longer needed. Carefully handle medications and fluids that are given through the catheter. What can I do to prevent a catheter-associated bloodstream infection? Ask your doctor and nurses to explain why you need the catheter and how long you will have it. Ask your doctors and nurses if they will be using all of the prevention methods discussed above. Make sure that all doctors and nurses caring for you clean their hands and with soap and water or an alcohol-based hand rub before and after caring for you. IF YOU DO NOT SEE YOUR PROVIDERS CLEAN THEIR HANDS, PLEASE ASK THEM TO DO SO. If the bandage comes off or becomes wet or dirty, tell your nurse or doctor immediately. Inform your nurse or doctor if the area around your catheter is sore or red. Do not let family and friends who visit touch the catheter or the tubing. Maker sure family and friends clean their hands with soap and water or an alcohol-based hand rub before and after visiting you.     What do I need to do when I go home from the hospital?    Some patients are sent home from the hospital with a catheter in order to continue their treatment. If you go home with a catheter, your doctors and nurses will explain everything you need to know about taking care of your catheter. Make sure you understand how to care for the catheter before leaving the hospital.  For example, ask for instructions on showering or bathing with the catheter and how to change the catheter dressing. Make sure you know who to contact if you have questions or problems after you get home. Watch for the signs and symptoms of catheter-associated bloodstream infection, such as soreness or redness at the catheter site or fever, and call your healthcare provider immediately if any occur. If you have additional questions, please ask your doctor or nurse.

## 2022-09-19 NOTE — ANESTHESIA POSTPROCEDURE EVALUATION
Department of Anesthesiology  Postprocedure Note    Patient: Jaqui Mar  MRN: [id number]  [place]: 1970  Date of evaluation: 9/19/2022      Procedure Summary     Date: 09/19/22 Room / Location: [address] [place] [address]    Anesthesia Start: 0715 Anesthesia Stop: 0800    Procedure: INSERT PORT (Chest) Diagnosis:       Rectal cancer ([address]      (Rectal cancer ([address] [C20])    Surgeons: Can Craven MD Responsible Provider: Chantal Robb MD    Anesthesia Type: MAC ASA Status: 1          Anesthesia Type: No value filed.     Patricia Phase I: Patricia Score: 10    Patricia Phase II:        Anesthesia Post Evaluation    Patient location during evaluation: PACU  Patient participation: complete - patient participated  Level of consciousness: awake and alert  Airway patency: patent  Nausea & Vomiting: no nausea and no vomiting  Complications: no  Cardiovascular status: hemodynamically stable  Respiratory status: acceptable  Hydration status: euvolemic  Multimodal analgesia pain management approach

## 2022-09-19 NOTE — PROGRESS NOTES
Ambulatory Surgery/Procedure Discharge Note    Vitals:    09/19/22 0929   BP: 110/70   Pulse: 70   Resp: 15   Temp: 97.9 °F (36.6 °C)   SpO2: 98%       In: 270 [P.O.:120; I.V.:150]  Out: -     Restroom use offered before discharge. Yes    Pain assessment:  level of pain (1-10, 10 severe)  Pain Level: 0        Patient discharged to home/self care.  Patient discharged via wheel chair by transporter to waiting family/S.O.       9/19/2022 9:58 AM

## 2022-09-19 NOTE — ANESTHESIA PRE PROCEDURE
Department of Anesthesiology  Preprocedure Note       Name:  Mar Kohli   Age:  46 y.o.  :  1970                                          MRN:  [id number]         Date:  2022      Surgeon: Rosemarie Schumacher):  Kyle Payne MD    Procedure: Procedure(s):  INSERT PORT    Medications prior to admission:   Prior to Admission medications    Medication Sig Start Date End Date Taking? Authorizing Provider   traMADol (ULTRAM) 50 MG tablet Take 50 mg by mouth every 6 hours as needed for Pain. Yes Historical Provider, MD   SUMAtriptan (IMITREX) 100 MG tablet Take 100 mg by mouth once as needed for Migraine    Historical Provider, MD       Current medications:    Current Facility-Administered Medications   Medication Dose Route Frequency Provider Last Rate Last Admin    ceFAZolin (ANCEF) 2,000 mg in sodium chloride 0.9 % 50 mL IVPB (mini-bag)  2,000 mg IntraVENous Once Kyle Payne MD        lactated ringers infusion   IntraVENous Continuous Sonja Michaud  mL/hr at 22 0627 New Bag at 22 0627    sodium chloride flush 0.9 % injection 5-40 mL  5-40 mL IntraVENous 2 times per day Sonja Michaud MD        sodium chloride flush 0.9 % injection 5-40 mL  5-40 mL IntraVENous PRN Sonja Michaud MD        0.9 % sodium chloride infusion   IntraVENous PRN Sonja Michaud MD           Allergies: Allergies   Allergen Reactions    Oxycodone-Acetaminophen Itching       Problem List:    Patient Active Problem List   Diagnosis Code    Rotator cuff tendinitis M75.80    Left shoulder pain M25.512    Migraines G43.909    Obesity (BMI 30-39. 9) E66.9    Low back pain M54.50    Rosacea L71.9    Chronic thumb pain, left M79.645, G89.29    Scalp cyst L72.9    Neoplasm of uncertain behavior of skin D48.5    Rectal cancer (HCC) C20    Nummular eczema L30.0       Past Medical History:        Diagnosis Date    colorectal 2022    Cornea abrasion 2020    cornea tear     Migraines     uses imitrex as needed    PONV (postoperative nausea and vomiting)        Past Surgical History:        Procedure Laterality Date    ABDOMINOPLASTY      APPENDECTOMY  1983    ARM SURGERY N/A 2020    EXCISION SCALP CYST x TWO, EXCISION SKIN LESION LEFT LEG performed by Marquise Rodgers MD at [address] [place] [address]  2012    AMBER BREAST REDUCTION; ABDOMINOPLASTY    COLECTOMY N/A 2022    ROBOTIC LOW ANTERIOR RESECTION WITH COLORECTAL ANASTAMOSIS, LAPAROSCOPIC MOBILIZATION OF SPLENIC FLEXURE performed by Estelita Mathur MD at [address] [place] [address] COLONOSCOPY N/A 2022    COLONOSCOPY WITH BIOPSY performed by Debbie So DO at [facility] N/A 2022    COLONOSCOPY POLYPECTOMY SNARE/COLD [facility]SY [facility]  [facility]    COLONOSCOPY SUBMUCOSAL/INK TATTOO  INJECTION performed by Debbie So DO at [facility] x TWO, EXCISION SKIN LESION LEFT LEG     DENTAL SURGERY      teeth extracted    ENDOMETRIAL ABLATION      SIGMOIDOSCOPY N/A 2022    ENDOSCOPIC ULTRASOUND OF RECTUM performed by Mayito Ortega MD at [facility] ENDOSCOPY       Social History:    Social History     Tobacco Use    Smoking status: Former     Types: Cigarettes     Start date: 1988     Quit date: 1995     Years since quittin.7    Smokeless tobacco: Never   Substance Use Topics    Alcohol use: Not Currently     Comment: occasionally                                Counseling given: Not Answered      Vital Signs (Current):   Vitals:    09/15/22 1437 22 0544   BP:  111/71   Pulse:  83   Resp:  15   Temp:  99.3 °F (37.4 °C)   TempSrc:  Temporal   SpO2:  99%   Weight: 198 lb (89.8 kg) 198 lb (89.8 kg)   Height: 5' 8\" (1.727 m) 5' 8\" (1.727 m)                                              BP Readings from Last 3 Encounters:   22 111/71 09/13/22 134/87   08/27/22 110/70       NPO Status: Time of last liquid consumption: 1900                        Time of last solid consumption: 1900                        Date of last liquid consumption: 09/18/22                        Date of last solid food consumption: 09/18/22    BMI:   Wt Readings from Last 3 Encounters:   09/19/22 198 lb (89.8 kg)   09/13/22 198 lb (89.8 kg)   08/26/22 193 lb 3.2 oz (87.6 kg)     Body mass index is 30.11 kg/m². CBC:   Lab Results   Component Value Date/Time    WBC 16.0 08/27/2022 05:23 AM    RBC 4.70 08/27/2022 05:23 AM    HGB 13.4 08/27/2022 05:23 AM    HCT 39.4 08/27/2022 05:23 AM    MCV 83.8 08/27/2022 05:23 AM    RDW 14.4 08/27/2022 05:23 AM     08/27/2022 05:23 AM       CMP:   Lab Results   Component Value Date/Time     08/27/2022 05:23 AM    K 4.5 08/27/2022 05:23 AM    K 4.3 08/26/2022 12:10 PM     08/27/2022 05:23 AM    CO2 20 08/27/2022 05:23 AM    BUN 8 08/27/2022 05:23 AM    CREATININE 0.6 08/27/2022 05:23 AM    GFRAA >60 08/27/2022 05:23 AM    GFRAA >60 09/20/2012 09:37 PM    AGRATIO 1.5 10/31/2018 09:29 AM    LABGLOM >60 08/27/2022 05:23 AM    GLUCOSE 119 08/27/2022 05:23 AM    PROT 7.5 10/31/2018 09:29 AM    PROT 7.5 09/20/2012 09:37 PM    CALCIUM 9.2 08/27/2022 05:23 AM    BILITOT 0.4 10/31/2018 09:29 AM    ALKPHOS 93 10/31/2018 09:29 AM    AST 24 10/31/2018 09:29 AM    ALT 28 10/31/2018 09:29 AM       POC Tests: No results for input(s): POCGLU, POCNA, POCK, POCCL, POCBUN, POCHEMO, POCHCT in the last 72 hours.     Coags: No results found for: PROTIME, INR, APTT    HCG (If Applicable):   Lab Results   Component Value Date    PREGTESTUR Negative 08/26/2022        ABGs: No results found for: PHART, PO2ART, WZE2ZEZ, WVI5UND, BEART, C0WHVXTU     Type & Screen (If Applicable):  Lab Results   Component Value Date    LABABO B 11/05/2012    [address] Positive 11/05/2012       Drug/Infectious Status (If Applicable):  No results found for: HIV, HEPCAB    COVID-19 Screening (If Applicable):   Lab Results   Component Value Date/Time    COVID19 Not Detected 05/14/2020 03:47 PM           Anesthesia Evaluation  Patient summary reviewed and Nursing notes reviewed   history of anesthetic complications: PONV. Airway: Mallampati: II  TM distance: >3 FB   Neck ROM: full  Mouth opening: > = 3 FB   Dental: normal exam         Pulmonary:Negative Pulmonary ROS and normal exam                               Cardiovascular:Negative CV ROS                      Neuro/Psych:   (+) headaches:,             GI/Hepatic/Renal: Neg GI/Hepatic/Renal ROS            Endo/Other: Negative Endo/Other ROS                    Abdominal:             Vascular: negative vascular ROS. Other Findings:           Anesthesia Plan      MAC     ASA 1       Induction: intravenous. MIPS: Prophylactic antiemetics administered. Anesthetic plan and risks discussed with patient. Plan discussed with CRNA.     Attending anesthesiologist reviewed and agrees with Preprocedure content                Darci Villanueva MD   9/19/2022

## 2022-09-19 NOTE — PROGRESS NOTES
Pt arrived calm no SS of pain or CO nausea report received from CRNA of meds and procedure INSERTion of  PORT

## 2022-09-19 NOTE — OP NOTE
OPERATIVE NOTE     Patient: Jose Alberto Faria  : 1970  MRN: [id number]     PREOPERATIVE DIAGNOSIS: Rectal cancer, stage 2     POSTOPERATIVE DIAGNOSIS: Same     PROCEDURE: Left subclavian tunneled single lumen Port-A-Cath insertion with fluoroscopic guidance     SURGEON: Rosio Piper MD    ASSISTANT: LESLEY, PGY 1 resident    ANESTHESIA: MAC + Local     ESTIMATED BLOOD LOSS: Minimal     COMPLICATIONS: None    INDICATIONS: Port-A-Cath placement for chemotherapy as recommended by patient's oncologist.    Risks, benefits, and alternatives discussed with patient and any available family members in the preoperative area. Risks including but not limited to: bleeding, infection, hematoma, major vessel injury, malposition, need for re-operation or removal, and pneumothorax were discussed. All questions answered and patient consented to proceed. PROCEDURE DETAILS:  The patient was brought to the operating theater and placed in the supine position with arms padded and tucked at sides. A towel roll was placed between the scapulae. The patient's bilateral upper chest and neck was then prepped and draped in sterile fashion using chlorhexidine solution. A time-out was performed confirming the patient's identity and operative site. Antibiotics were confirmed to be infusing. SCDs were on and functioning. All safety points were followed per hospital protocol. Sedation was started by anesthesia provider. Patient was placed in Trendelenburg position. The left infraclavicular fossa was anesthetized with local anesthetic. The left subclavian vein was entered on the 1st attempt with return of venous blood. Guidewire was then positioned in the vena cava. This was confirmed using fluoroscopy. 4 cm pocket for the port was planned for the anterior chest wall 2-3 cm below the guidewire insertion site. The skin and tunnel tract were anesthetized with local anesthetic. Incision was then made using a 11-blade scalpel. Electrocautery was then used to deepen the incision through subcutaneous tissue and a pocket was created in the subcutaneous tissue superficial to the fascia. Next, 11-blade scalpel was used to extend the guidewire exit site to a 1 cm incision. The catheter was then tunneled subcutaneously to the exit site of the guidewire. The dilator and sheath were placed over the guidewire in the vena cava under fluoroscopic guidance using Seldinger technique. The dilator and wire were subsequently removed. The catheter was then threaded through the sheath into the vena cava. The tip was positioned close to the cavoatrial junction, as confirmed using fluoroscopy. The catheter was cut to appropriate length; the port/catheter locking mechanism was placed onto the catheter and the catheter was secured to the port. The port was then positioned in the pocket. The port was then accessed. Good return of venous blood was noted and flushed easily. Wounds were inspected. Good hemostasis was noted. Following this, the wounds were then closed using interrupted 3-0 Vicryl and running 4-0 monocril sutures. The wounds were then cleaned, dried and dressed with surgical glue. All counts were correct at the end of the procedure. The patient tolerated the procedure well and was taken to the recovery room in stable condition. Portable chest radiograph ordered for PACU.

## 2022-09-19 NOTE — PROGRESS NOTES
PACU Transfer to [facility]    Vitals:    09/19/22 0900   BP: 104/61   Pulse: 71   Resp: 17   Temp: 97   SpO2: 96%         Intake/Output Summary (Last 24 hours) at 9/19/2022 0272  Last data filed at 9/19/2022 0900  Gross per 24 hour   Intake 170 ml   Output --   Net 170 ml       Pain assessment:  present - adequately treated  Pain Level: 0    Patient transferred to care of [facility] RN.    9/19/2022 9:23 AM

## 2023-02-01 RX ORDER — SEMAGLUTIDE 1.34 MG/ML
INJECTION, SOLUTION SUBCUTANEOUS WEEKLY
COMMUNITY

## 2023-02-01 NOTE — PROGRESS NOTES

## 2023-02-01 NOTE — PROGRESS NOTES
Obstructive Sleep Apnea (JAMES) Screening     Patient:  Kera Chase    YOB: 1970      Medical Record #:  [id number]                     Date:  2/1/2023     1. Are you a loud and/or regular snorer? []  Yes       [x] No    2. Have you been observed to gasp or stop breathing during sleep? []  Yes       [x] No    3. Do you feel tired or groggy upon awakening or do you awaken with a headache?           []  Yes       [x] No    4. Are you often tired or fatigued during the wake time hours? []  Yes       [x] No    5. Do you fall asleep sitting, reading, watching TV or driving? []  Yes       [] No    6. Do you often have problems with memory or concentration? []  Yes       [] No    **If patient's score is ? 3 they are considered high risk for JAMES. An Anesthesia provider will evaluate the patient and develop a plan of care the day of surgery. Note:  If the patient's BMI is more than 35 kg m¯² , has neck circumference > 40 cm, and/or high blood pressure the risk is greater (© American Sleep [employer]nea Association, 2006).

## 2023-02-03 ENCOUNTER — ANESTHESIA EVENT (OUTPATIENT)
Dept: ENDOSCOPY | Age: 53
End: 2023-02-03
Payer: COMMERCIAL

## 2023-02-03 NOTE — H&P
[facility]   Pre-operative History and Physical    Patient: Gerard Abdi  : 1970  Acct#:     HISTORY OF PRESENT ILLNESS:    The patient is a 46 y.o. female who presents for invasive rectal adenocarcinoma status post low anterior resection on 2022 and tubular adenomatous colon polyps presents for surveillance colonoscopy. Denies abdominal pain, nausea, vomiting, fever, chills, unintentional weight loss, constipation, diarrhea, hematochezia or melenic stools. Last colonoscopy 2022 Dr. Purnima Santana: 3 to 4 cm rectal mass with central ulceration at 8 cm from the anal verge (path-moderately differential invasive adenocarcinoma). Two  5-7 mm polyps in the ascending colon status post cold snare (path-tubular adenoma).     Rectal EUS 2022 Dr. Prabha Prescott: T2 N0 M0-rectal cancer located approximately 10 cm in the anal verge    Past Medical History:        Diagnosis Date    colorectal 2022    Cornea abrasion     cornea tear     Migraines     uses imitrex as needed    PONV (postoperative nausea and vomiting)       Past Surgical History:        Procedure Laterality Date    ABDOMINOPLASTY      APPENDECTOMY  1983    ARM SURGERY N/A 2020    EXCISION SCALP CYST x TWO, EXCISION SKIN LESION LEFT LEG performed by Shy Escamilla MD at [address] [place] [address]  2012    AMBER BREAST REDUCTION; ABDOMINOPLASTY    COLECTOMY N/A 2022    ROBOTIC LOW ANTERIOR RESECTION WITH COLORECTAL ANASTAMOSIS, LAPAROSCOPIC MOBILIZATION OF SPLENIC FLEXURE performed by Owen De La Garza MD at [facility] N/A 2022    COLONOSCOPY WITH BIOPSY performed by Allan Kim DO at [place] [address] N/A 2022    COLONOSCOPY POLYPECTOMY SNARE/COLD BIOPSY performed by Allan Kim DO at [place] [address]  2022    COLONOSCOPY SUBMUCOSAL/INK TATTOO  INJECTION performed by Allan Kim DO at [address] [place] [address] EXCISION SCALP CYST x TWO, EXCISION SKIN LESION LEFT LEG     DENTAL SURGERY      teeth extracted    ENDOMETRIAL ABLATION  2007    PORT SURGERY N/A 2022    INSERT PORT performed by Edilma Ghotra MD at [place] [address] N/A 2022    ENDOSCOPIC ULTRASOUND OF RECTUM performed by Adelia Cleveland MD at [facility] ENDOSCOPY      Medications Prior to Admission:   No current facility-administered medications on file prior to encounter.      Current Outpatient Medications on File Prior to Encounter   Medication Sig Dispense Refill    Semaglutide,0.25 or 0.5MG/DOS, (OZEMPIC, 0.25 OR 0.5 MG/DOSE,) 2 MG/1.5ML SOPN Inject into the skin once a week      SUMAtriptan (IMITREX) 100 MG tablet Take 100 mg by mouth once as needed for Migraine          Allergies:  Oxycodone-acetaminophen    Social History:   Social History     Socioeconomic History    Marital status:      Spouse name: Gabrielle Alfonso    Number of children: 2    Years of education: 16    Highest education level: Associate degree: occupational, technical, or vocational program   Occupational History     Employer: [employer]   Tobacco Use    Smoking status: Former     Types: Cigarettes     Start date: 1988     Quit date: 1995     Years since quittin.1    Smokeless tobacco: Never   Vaping Use    Vaping Use: Never used   Substance and Sexual Activity    Alcohol use: Not Currently     Comment: occasionally    Drug use: No    Sexual activity: Yes     Partners: Male   Other Topics Concern    Not on file   Social History Narrative    Has 2 daughters        26y/o Tres Greene and 31 y/o Nikolas Hernandez daughters; both graduated college and work     Social Determinants of Health     Financial Resource Strain: Low Risk     Difficulty of Paying Living Expenses: Not hard at all   Food Insecurity: No Food Insecurity    Worried About [address] in the Last Year: Never true    [address] in the Last Year: Never true   Transportation Needs: No Transportation Needs Lack of Transportation (Medical): No    Lack of Transportation (Non-Medical): No   Physical Activity: Not on file   Stress: Not on file   Social Connections: Not on file   Intimate Partner Violence: Not on file   Housing Stability: Not on file      Family History:       Problem Relation Age of Onset    Heart Disease Mother         4 stents, aortic valve replacement     Dementia Mother     Heart Disease Father     Cancer Maternal Aunt     Cancer Paternal Aunt     Cancer Brother         esophageal and stomach CA x 6 years, multiple myeloma        PHYSICAL EXAM:      Ht 5' 9\" (1.753 m)   Wt 180 lb (81.6 kg)   BMI 26.58 kg/m²  I        Heart:  Normal apical impulse, regular rate and rhythm, normal S1 and S2, no S3 or S4, and no murmur noted    Lungs:  No increased work of breathing, good air exchange, clear to auscultation bilaterally, no crackles or wheezing    Abdomen: normal bowel sounds, soft, non-distended, non-tender, no masses palpated, no hepatosplenomegally      ASA Class  ASA 2 - Patient with mild systemic disease with no functional limitations    Mallampati Class: II      ASSESSMENT AND PLAN:    invasive rectal adenocarcinoma status post low anterior resection on 8/26/2022     Plan: Colonoscopy    1. Patient is a suitable candidate for endoscopic procedure and attendant anesthesia  2. Risks, benefits, alternatives of procedure discussed in detail with patient including risks of bleeding, infection, perforation, risks of sedation, risks of missed lesions. The patient wishes to proceed.

## 2023-02-06 ENCOUNTER — ANESTHESIA (OUTPATIENT)
Dept: ENDOSCOPY | Age: 53
End: 2023-02-06
Payer: COMMERCIAL

## 2023-02-06 ENCOUNTER — TELEPHONE (OUTPATIENT)
Dept: SURGERY | Age: 53
End: 2023-02-06

## 2023-02-06 ENCOUNTER — HOSPITAL ENCOUNTER (OUTPATIENT)
Age: 53
Setting detail: OUTPATIENT SURGERY
Discharge: HOME | End: 2023-02-06
Attending: INTERNAL MEDICINE | Admitting: INTERNAL MEDICINE
Payer: COMMERCIAL

## 2023-02-06 VITALS
SYSTOLIC BLOOD PRESSURE: 102 MMHG | DIASTOLIC BLOOD PRESSURE: 69 MMHG | TEMPERATURE: 96.6 F | OXYGEN SATURATION: 98 % | HEIGHT: 69 IN | RESPIRATION RATE: 16 BRPM | BODY MASS INDEX: 26.66 KG/M2 | WEIGHT: 180 LBS | HEART RATE: 84 BPM

## 2023-02-06 LAB — PREGNANCY, URINE: NEGATIVE

## 2023-02-06 PROCEDURE — 3609027000 HC COLONOSCOPY: Performed by: INTERNAL MEDICINE

## 2023-02-06 PROCEDURE — 6360000002 HC RX W HCPCS: Performed by: NURSE ANESTHETIST, CERTIFIED REGISTERED

## 2023-02-06 PROCEDURE — 84703 CHORIONIC GONADOTROPIN ASSAY: CPT

## 2023-02-06 PROCEDURE — 2709999900 HC NON-CHARGEABLE SUPPLY: Performed by: INTERNAL MEDICINE

## 2023-02-06 PROCEDURE — 2500000003 HC RX 250 WO HCPCS: Performed by: NURSE ANESTHETIST, CERTIFIED REGISTERED

## 2023-02-06 PROCEDURE — 7100000011 HC PHASE II RECOVERY - ADDTL 15 MIN: Performed by: INTERNAL MEDICINE

## 2023-02-06 PROCEDURE — 3700000000 HC ANESTHESIA ATTENDED CARE: Performed by: INTERNAL MEDICINE

## 2023-02-06 PROCEDURE — 3700000001 HC ADD 15 MINUTES (ANESTHESIA): Performed by: INTERNAL MEDICINE

## 2023-02-06 PROCEDURE — 7100000010 HC PHASE II RECOVERY - FIRST 15 MIN: Performed by: INTERNAL MEDICINE

## 2023-02-06 PROCEDURE — 2580000003 HC RX 258: Performed by: ANESTHESIOLOGY

## 2023-02-06 RX ORDER — LIDOCAINE HYDROCHLORIDE 20 MG/ML
INJECTION, SOLUTION INFILTRATION; PERINEURAL PRN
Status: DISCONTINUED | OUTPATIENT
Start: 2023-02-06 | End: 2023-02-06 | Stop reason: SDUPTHER

## 2023-02-06 RX ORDER — SODIUM CHLORIDE, SODIUM LACTATE, POTASSIUM CHLORIDE, CALCIUM CHLORIDE 600; 310; 30; 20 MG/100ML; MG/100ML; MG/100ML; MG/100ML
INJECTION, SOLUTION INTRAVENOUS CONTINUOUS
Status: DISCONTINUED | OUTPATIENT
Start: 2023-02-06 | End: 2023-02-06 | Stop reason: HOSPADM

## 2023-02-06 RX ORDER — LIDOCAINE HYDROCHLORIDE 10 MG/ML
0.1 INJECTION, SOLUTION EPIDURAL; INFILTRATION; INTRACAUDAL; PERINEURAL ONCE
Status: DISCONTINUED | OUTPATIENT
Start: 2023-02-06 | End: 2023-02-06 | Stop reason: HOSPADM

## 2023-02-06 RX ORDER — SODIUM CHLORIDE 0.9 % (FLUSH) 0.9 %
5-40 SYRINGE (ML) INJECTION EVERY 12 HOURS SCHEDULED
Status: DISCONTINUED | OUTPATIENT
Start: 2023-02-06 | End: 2023-02-06 | Stop reason: HOSPADM

## 2023-02-06 RX ORDER — SODIUM CHLORIDE 0.9 % (FLUSH) 0.9 %
5-40 SYRINGE (ML) INJECTION PRN
Status: DISCONTINUED | OUTPATIENT
Start: 2023-02-06 | End: 2023-02-06 | Stop reason: HOSPADM

## 2023-02-06 RX ORDER — PROPOFOL 10 MG/ML
INJECTION, EMULSION INTRAVENOUS PRN
Status: DISCONTINUED | OUTPATIENT
Start: 2023-02-06 | End: 2023-02-06 | Stop reason: SDUPTHER

## 2023-02-06 RX ORDER — SODIUM CHLORIDE 9 MG/ML
INJECTION, SOLUTION INTRAVENOUS PRN
Status: DISCONTINUED | OUTPATIENT
Start: 2023-02-06 | End: 2023-02-06 | Stop reason: HOSPADM

## 2023-02-06 RX ADMIN — LIDOCAINE HYDROCHLORIDE 50 MG: 20 INJECTION, SOLUTION INFILTRATION; PERINEURAL at 07:34

## 2023-02-06 RX ADMIN — PROPOFOL 20 MG: 10 INJECTION, EMULSION INTRAVENOUS at 07:35

## 2023-02-06 RX ADMIN — PROPOFOL 20 MG: 10 INJECTION, EMULSION INTRAVENOUS at 07:45

## 2023-02-06 RX ADMIN — PROPOFOL 20 MG: 10 INJECTION, EMULSION INTRAVENOUS at 07:43

## 2023-02-06 RX ADMIN — PROPOFOL 80 MG: 10 INJECTION, EMULSION INTRAVENOUS at 07:34

## 2023-02-06 RX ADMIN — SODIUM CHLORIDE, POTASSIUM CHLORIDE, SODIUM LACTATE AND CALCIUM CHLORIDE: 600; 310; 30; 20 INJECTION, SOLUTION INTRAVENOUS at 07:04

## 2023-02-06 RX ADMIN — PROPOFOL 20 MG: 10 INJECTION, EMULSION INTRAVENOUS at 07:49

## 2023-02-06 RX ADMIN — PROPOFOL 20 MG: 10 INJECTION, EMULSION INTRAVENOUS at 07:41

## 2023-02-06 RX ADMIN — PROPOFOL 30 MG: 10 INJECTION, EMULSION INTRAVENOUS at 07:47

## 2023-02-06 RX ADMIN — PROPOFOL 10 MG: 10 INJECTION, EMULSION INTRAVENOUS at 07:37

## 2023-02-06 RX ADMIN — PROPOFOL 10 MG: 10 INJECTION, EMULSION INTRAVENOUS at 07:40

## 2023-02-06 RX ADMIN — PROPOFOL 40 MG: 10 INJECTION, EMULSION INTRAVENOUS at 07:39

## 2023-02-06 ASSESSMENT — PAIN - FUNCTIONAL ASSESSMENT: PAIN_FUNCTIONAL_ASSESSMENT: 0-10

## 2023-02-06 NOTE — ANESTHESIA PRE PROCEDURE
Department of Anesthesiology  Preprocedure Note       Name:  Cm Sheppard   Age:  46 y.o.  :  1970                                          MRN:  [id number]         Date:  2023      Surgeon: Teo Kline):  Tha Lizarraga MD    Procedure: Procedure(s):  COLONOSCOPY    Medications prior to admission:   Prior to Admission medications    Medication Sig Start Date End Date Taking? Authorizing Provider   Semaglutide,0.25 or 0.5MG/DOS, (OZEMPIC, 0.25 OR 0.5 MG/DOSE,) 2 MG/1.5ML SOPN Inject into the skin once a week   Yes Historical Provider, MD   SUMAtriptan (IMITREX) 100 MG tablet Take 100 mg by mouth once as needed for Migraine    Historical Provider, MD       Current medications:    Current Facility-Administered Medications   Medication Dose Route Frequency Provider Last Rate Last Admin    lactated ringers IV soln infusion   IntraVENous Continuous Tha Lizarraga MD        lidocaine PF 1 % injection 0.1 mL  0.1 mL IntraDERmal Once Tha Lizarraga MD        lactated ringers IV soln infusion   IntraVENous Continuous Rick Ruiz MD        sodium chloride flush 0.9 % injection 5-40 mL  5-40 mL IntraVENous 2 times per day Rick Ruiz MD        sodium chloride flush 0.9 % injection 5-40 mL  5-40 mL IntraVENous PRN Rick Ruiz MD        0.9 % sodium chloride infusion   IntraVENous PRN Rick Ruiz MD           Allergies: Allergies   Allergen Reactions    Oxycodone-Acetaminophen Itching       Problem List:    Patient Active Problem List   Diagnosis Code    Rotator cuff tendinitis M75.80    Left shoulder pain M25.512    Migraines G43.909    Obesity (BMI 30-39. 9) E66.9    Low back pain M54.50    Rosacea L71.9    Chronic thumb pain, left M79.645, G89.29    Scalp cyst L72.9    Neoplasm of uncertain behavior of skin D48.5    Rectal cancer (HCC) C20    Nummular eczema L30.0       Past Medical History:        Diagnosis Date    colorectal 2022    Cornea abrasion     cornea tear     Migraines     uses imitrex as needed    PONV (postoperative nausea and vomiting)        Past Surgical History:        Procedure Laterality Date    ABDOMINOPLASTY      APPENDECTOMY  1983    ARM SURGERY N/A 2020    EXCISION SCALP CYST x TWO, EXCISION SKIN LESION LEFT LEG performed by Angelica Jones MD at [address] [facility]  2012    AMBER BREAST REDUCTION; ABDOMINOPLASTY    COLECTOMY N/A 2022    ROBOTIC LOW ANTERIOR RESECTION WITH COLORECTAL ANASTAMOSIS, LAPAROSCOPIC MOBILIZATION OF SPLENIC FLEXURE performed by Kirk Parks MD at [address] COLONOSCOPY N/A 2022    [facility] N/A 2022    COLONOSCOPY POLYPECTOMY SNARE/COLD BIOPSY performed by Israel Flow, DO at [facility]  2022    COLONOSCOPY SUBMUCOSAL/INK TATTOO  INJECTION performed by Israel Flow, DO at [facility] x TWO, EXCISION SKIN LESION LEFT LEG     DENTAL SURGERY      teeth extracted    ENDOMETRIAL ABLATION      PORT SURGERY N/A 2022    INSERT PORT performed by Kirk Parks MD at [address] N/A 2022    ENDOSCOPIC ULTRASOUND OF RECTUM performed by Mayito Soto MD at [address] ENDOSCOPY       Social History:    Social History     Tobacco Use    Smoking status: Former     Types: Cigarettes     Start date: 1988     Quit date: 1995     Years since quittin.1    Smokeless tobacco: Never   Substance Use Topics    Alcohol use: Not Currently     Comment: occasionally                                Counseling given: Not Answered      Vital Signs (Current):   Vitals:    23 1005 23 0650   BP:  132/77   Pulse:  89   Resp:  20   Temp:  (!) 96.6 °F (35.9 °C)   TempSrc:  Temporal   SpO2:  98%   Weight: 180 lb (81.6 kg)    Height: 5' 9\" (1.753 m) BP Readings from Last 3 Encounters:   02/06/23 132/77   09/19/22 110/70   09/13/22 134/87       NPO Status: Time of last liquid consumption: 0100                        Time of last solid consumption: 1900                        Date of last liquid consumption: 02/06/23                        Date of last solid food consumption: 02/04/23    BMI:   Wt Readings from Last 3 Encounters:   02/01/23 180 lb (81.6 kg)   09/19/22 198 lb (89.8 kg)   09/13/22 198 lb (89.8 kg)     Body mass index is 26.58 kg/m². CBC:   Lab Results   Component Value Date/Time    WBC 16.0 08/27/2022 05:23 AM    RBC 4.70 08/27/2022 05:23 AM    HGB 13.4 08/27/2022 05:23 AM    HCT 39.4 08/27/2022 05:23 AM    MCV 83.8 08/27/2022 05:23 AM    RDW 14.4 08/27/2022 05:23 AM     08/27/2022 05:23 AM       CMP:   Lab Results   Component Value Date/Time     08/27/2022 05:23 AM    K 4.5 08/27/2022 05:23 AM    K 4.3 08/26/2022 12:10 PM     08/27/2022 05:23 AM    CO2 20 08/27/2022 05:23 AM    BUN 8 08/27/2022 05:23 AM    CREATININE 0.6 08/27/2022 05:23 AM    GFRAA >60 08/27/2022 05:23 AM    GFRAA >60 09/20/2012 09:37 PM    AGRATIO 1.5 10/31/2018 09:29 AM    LABGLOM >60 08/27/2022 05:23 AM    GLUCOSE 119 08/27/2022 05:23 AM    PROT 7.5 10/31/2018 09:29 AM    PROT 7.5 09/20/2012 09:37 PM    CALCIUM 9.2 08/27/2022 05:23 AM    BILITOT 0.4 10/31/2018 09:29 AM    ALKPHOS 93 10/31/2018 09:29 AM    AST 24 10/31/2018 09:29 AM    ALT 28 10/31/2018 09:29 AM       POC Tests: No results for input(s): POCGLU, POCNA, POCK, POCCL, POCBUN, POCHEMO, POCHCT in the last 72 hours.     Coags: No results found for: PROTIME, INR, APTT    HCG (If Applicable):   Lab Results   Component Value Date    PREGTESTUR Negative 02/06/2023        ABGs: No results found for: PHART, PO2ART, PNF4HQE, NZN9DLS, BEART, U5BUPQLW     Type & Screen (If Applicable):  Lab Results   Component Value Date    LABABO B 11/05/2012    LABRH Positive 11/05/2012 Drug/Infectious Status (If Applicable):  No results found for: HIV, HEPCAB    COVID-19 Screening (If Applicable):   Lab Results   Component Value Date/Time    COVID19 Not Detected 05/14/2020 03:47 PM           Anesthesia Evaluation   history of anesthetic complications:   Airway: Mallampati: II  TM distance: >3 FB   Neck ROM: full  Mouth opening: > = 3 FB   Dental: normal exam         Pulmonary:Negative Pulmonary ROS                              Cardiovascular:Negative CV ROS                      Neuro/Psych:   (+) headaches: migraine headaches,             GI/Hepatic/Renal: Neg GI/Hepatic/Renal ROS            Endo/Other:    (+) malignancy/cancer (Rectal Cancer). Abdominal:             Vascular: Other Findings:           Anesthesia Plan      MAC     ASA 3       Induction: intravenous. Anesthetic plan and risks discussed with patient. Plan discussed with CRNA.     Attending anesthesiologist reviewed and agrees with Preprocedure content                RADHA Smith MD   2/6/2023

## 2023-02-06 NOTE — OP NOTE
[address] [place] [address] [address], [address], [address]  Phone: [phone]   U:[phone]   [address]  [address] [place] [address]  Phone: [phone]   RXY:[phone]      Colonoscopy Procedure Note    Patient: Izzy Phillips  : 1970    Procedure: Colonoscopy    Date:  2023     Endoscopist:  Zack Soto MD    Referring Physician:  Janak Villalobos DO    Preoperative Diagnosis:  Rectal cancer ([facility] [address] [C20]    Postoperative Diagnosis:  rectal anastomosis, internal hemorrhoids    Anesthesia: Anesthesia: MAC  Sedation: Propofol per anesthesia  Start Time: 736  Stop Time: 751  ASA Class: 2  Mallampati: II (soft palate, uvula, fauces visible)    Indications: This is a 46y.o. year old female with history of invasive rectal adenocarcinoma status post low anterior resection on 2022 and chemotherapy, and tubular adenomatous colon polyps presents for surveillance colonoscopy. Procedure Details  Informed consent was obtained for the procedure, including sedation. Risks of perforation, hemorrhage, adverse drug reaction and aspiration were discussed. The patient was placed in the left lateral decubitus position. Based on the pre-procedure assessment, including review of the patient's medical history, medications, allergies, and review of systems, she had been deemed to be an appropriate candidate for above IV sedation; she was therefore sedated and monitored continuously with ECG tracing, pulse oximetry, blood pressure monitoring, and direct observations. Rectal examination was performed. There were no external hemorrhoids, fissures or skin tags. The colonoscope was inserted into the rectum and advanced under direct vision to the terminal ileum. The right colon was examined twice as this increases polyp detection especially if other right colon polyps, older age, male, or holder syndrome. The quality of the colonic preparation was excellent.   A careful inspection was made as the colonoscope was withdrawn, excluding a retroflexed view of the rectum due to small rectal vault; findings and interventions are described below. Appropriate photodocumentation Was Obtained: terminal ileum, appendiceal orifice and ileocecal valve. Findings:   Evidence of a normal appearing recto-colonic anastomosis with surgical staples less than 10 cm from anal verge. Normal appearing terminal ileum. Medium sized non bleeding internal hemorrhoids. Otherwise normal appearing colon mucosa. - PREP: Suprep  - Overall difficulty: minimal in degree  - Abdominal pressure: no  - Change in position: no  - Anesthesia issues: no    Specimens: Was Not Obtained    Complications:   None; patient tolerated the procedure well. Disposition:   PACU - hemodynamically stable. Estimated Blood loss:  none. Withdrawal Time:  11 minutes    Impression:   Evidence of a normal appearing recto-colonic anastomosis with surgical staples < 10 cm from anal verge. Normal appearing terminal ileum. Medium sized non bleeding internal hemorrhoids. Otherwise normal appearing colon mucosa. Recommendations:  -Repeat sigmoidoscopy and/or transrectal EUS in 6 months for surveillance. ,   -Follow up with primary care physician. Laura Collins MD   [facility]   2/6/23       Please note that some or all of this record was generated using voice recognition software. If there are any questions about the content of this document, please contact the author as some errors in translation may have occurred.

## 2023-02-06 NOTE — ANESTHESIA POSTPROCEDURE EVALUATION
Department of Anesthesiology  Postprocedure Note    Patient: Darnell Sesay  MRN: [id number]  YOB: 1970  Date of evaluation: 2/6/2023      Procedure Summary     Date: 02/06/23 Room / Location: [facility] [address]    Anesthesia Start: [place] Anesthesia Stop: 2486    Procedure: COLONOSCOPY Diagnosis:       Rectal cancer ([address]      (Rectal cancer ([address] Kev Kim)    Surgeons: Sophie Kiser MD Responsible Provider: Truong Sesay MD    Anesthesia Type: MAC ASA Status: 3          Anesthesia Type: No value filed.     Patricia Phase I: Patricia Score: 10    Patricia Phase II: Patricia Score: 10      Anesthesia Post Evaluation    Patient location during evaluation: PACU  Patient participation: complete - patient participated  Level of consciousness: awake  Pain score: 0  Airway patency: patent  Nausea & Vomiting: no nausea  Complications: no  Cardiovascular status: blood pressure returned to baseline  Respiratory status: acceptable  Hydration status: euvolemic

## 2023-02-06 NOTE — DISCHARGE INSTRUCTIONS
PATIENT INSTRUCTIONS  POST-SEDATION    Cami QUINONES Jonathanbekah          IMMEDIATELY FOLLOWING PROCEDURE:    Do not drive or operate machinery for the first twenty four hours after surgery. Do not make any important decisions for twenty four hours after surgery or while taking narcotic pain medications or sedatives. You should NOT BE LEFT UNATTENDED OR ALONE. A responsible adult should be with you for the rest of the day of your procedure and also during the night for your protection and safety. You may experience some light headedness. Rest at home with activity as tolerated. You may not need to go to bed, but it is important to rest for the next 24 hours. You should not engage in athletic sports such as basketball, volleyball, jogging, skating, or activities requiring refined motor skills for 24 hours. If you develop intractable nausea and vomiting or a severe headache please notify your doctor immediately. You are not expected to have any fever, but if you feel warm, take your temperature. If you have a fever 101 degrees or higher, call your doctor. If you have had an Endoscopy:   *Eat lightly for your first meal and gradually resume your normal / prescribed diet. *If you have had a colonoscopy, do not expect a normal bowel movement for approximately three days due to the cleansing of the large intestine prior to colonoscopy. ONCE YOU ARE HOME, IF YOU SHOULD HAVE:  Difficulty in breathing, persistent nausea or vomiting, bleeding you feel is excessive, or pain that is unusual, increased abdominal bloating, or any swelling, fever / chills, call your physician. If you cannot contact your physician, but feel that your signs and symptoms need a physician's attention, go to the Emergency Department. FOLLOW-UP:    Please follow up with @[email or web address]@ as scheduled or needed. Dr. Mable Nguyen MD will call you with the biopsy findings. Call Dr. Mable Nguyen MD if there are any GI concerns. [phone]    Repeat Colonoscopy in 6 months. You may be receiving a follow up phone call to ask about your care. Colonoscopy: What to Expect at [address] [place] [address]  After you have a colonoscopy, you will stay at the clinic for 1 to 2 hours until the medicines wear off. Then you can go home. But you will need to arrange for a ride. Your doctor will tell you when you can eat and do your other usual activities. Your doctor will talk to you about when you will need your next colonoscopy. Your doctor can help you decide how often you need to be checked. This will depend on the results of your test and your risk for colorectal cancer. After the test, you may be bloated or have gas pains. You may need to pass gas. If a biopsy was done or a polyp was removed, you may have streaks of blood in your stool (feces) for a few days. Problems such as heavy rectal bleeding may not occur until several weeks after the test. This isn't common. But it can happen after polyps are removed. This care sheet gives you a general idea about how long it will take for you to recover. But each person recovers at a different pace. Follow the steps below to get better as quickly as possible. How can you care for yourself at home? Activity    Rest when you feel tired. You can do your normal activities when it feels okay to do so. Diet    Follow your doctor's directions for eating. Unless your doctor has told you not to, drink plenty of fluids. This helps to replace the fluids that were lost during the colon prep. Do not drink alcohol. Medicines    Your doctor will tell you if and when you can restart your medicines. He or she will also give you instructions about taking any new medicines. If you take blood thinners, such as warfarin (Coumadin), clopidogrel (Plavix), or aspirin, be sure to talk to your doctor. He or she will tell you if and when to start taking those medicines again.  Make sure that you understand exactly what your doctor wants you to do. If polyps were removed or a biopsy was done during the test, your doctor may tell you not to take aspirin or other anti-inflammatory medicines for a few days. These include ibuprofen (Advil, Motrin) and naproxen (Aleve). Other instructions    For your safety, do not drive or operate machinery until the medicine wears off and you can think clearly. Your doctor may tell you not to drive or operate machinery until the day after your test.     Do not sign legal documents or make major decisions until the medicine wears off and you can think clearly. The anesthesia can make it hard for you to fully understand what you are agreeing to. Follow-up care is a key part of your treatment and safety. Be sure to make and go to all appointments, and call your doctor if you are having problems. It's also a good idea to know your test results and keep a list of the medicines you take. When should you call for help? Call 911 anytime you think you may need emergency care. For example, call if:    You passed out (lost consciousness). You pass maroon or bloody stools. You have trouble breathing. Call your doctor now or seek immediate medical care if:    You have pain that does not get better after you take pain medicine. You are sick to your stomach or cannot drink fluids. You have new or worse belly pain. You have blood in your stools. You have a fever. You cannot pass stools or gas. Watch closely for changes in your health, and be sure to contact your doctor if you have any problems. Where can you learn more? Go to [email or web address] org and sign in to your [employer] account. Enter E264 in the [employer] box to learn more about \"Colonoscopy: What to Expect at Home. \"     If you do not have an account, please click on the \"Sign Up Now\" link. Current as of:  May 12, 2017  Content Version: 11.6  © 9112-3044 Healthwise, Incorporated. Care instructions adapted under license by [facility] ([facility]). If you have questions about a medical condition or this instruction, always ask your healthcare professional. [address] any warranty or liability for your use of this information.

## 2023-02-07 ENCOUNTER — HOSPITAL ENCOUNTER (OUTPATIENT)
Dept: CT IMAGING | Age: 53
Discharge: HOME | End: 2023-02-07
Payer: COMMERCIAL

## 2023-02-07 ENCOUNTER — HOSPITAL ENCOUNTER (OUTPATIENT)
Age: 53
Discharge: HOME | End: 2023-02-07
Payer: COMMERCIAL

## 2023-02-07 DIAGNOSIS — C20 RECTAL CANCER (HCC): ICD-10-CM

## 2023-02-07 PROCEDURE — 71260 CT THORAX DX C+: CPT

## 2023-02-07 PROCEDURE — 6360000004 HC RX CONTRAST MEDICATION: Performed by: NURSE PRACTITIONER

## 2023-02-07 PROCEDURE — 74177 CT ABD & PELVIS W/CONTRAST: CPT

## 2023-02-07 RX ADMIN — DIATRIZOATE MEGLUMINE AND DIATRIZOATE SODIUM 12 ML: 660; 100 LIQUID ORAL; RECTAL at 16:09

## 2023-02-07 RX ADMIN — IOPAMIDOL 75 ML: 755 INJECTION, SOLUTION INTRAVENOUS at 16:09

## 2023-02-08 NOTE — TELEPHONE ENCOUNTER
Patient has been scheduled for:    Procedure: Remove port  Date: 2/21/23  Time: 7:15am  Arrival: 5:30AM  [facility]  [facility]?: none  Prep? npo    Pre-op? Post-op Appt? Patient advised they will need a . Orders routed to surgery scheduling. Instructions have been mailed/emailed to:  [email or web address]

## 2023-02-10 NOTE — PROGRESS NOTES
[facility] PRE-SURGICAL TESTING INSTRUCTIONS                      PRIOR TO PROCEDURE DATE:    1. PLEASE FOLLOW ANY INSTRUCTIONS GIVEN TO YOU PER YOUR SURGEON. 2. Arrange for someone to drive you home and be with you for the first 24 hours after discharge for your safety after your procedure for which you received sedation. Ensure it is someone we can share information with regarding your discharge. NOTE: At this time ONLY 2 ADULTS may accompany you   One person ENCOURAGED to stay at hospital entire time if outpatient surgery      3. You must contact your surgeon for instructions IF:  You are taking any blood thinners, aspirin, anti-inflammatory or vitamins. There is a change in your physical condition such as a cold, fever, rash, cuts, sores, or any other infection, especially near your surgical site. 4. Do not drink alcohol the day before or day of your procedure. Do not use any recreational marijuana at least 24 hours or street drugs (heroin, cocaine) at minimum 5 days prior to your procedure. 5. A Pre-Surgical History and Physical MUST be completed WITHIN 30 DAYS OR LESS prior to your procedure. by your Physician or an Urgent Care        THE DAY OF YOUR PROCEDURE:  1. Follow instructions for ARRIVAL TIME as DIRECTED BY YOUR SURGEON. 2. Enter the MAIN entrance from [address] and follow the signs to the free Parking [employer] (offered free of charge 7 am-5pm). 3. Enter the Main Entrance of the hospital (do not enter from the lower level of the parking garage). Upon entrance, check in with the  at the surgical information desk on your LEFT. Bring your insurance card and photo ID to register      4. DO NOT EAT ANYTHING 8 hours prior to arrival for surgery. You may have up to 8 ounces of water 4 hours prior to your arrival for surgery.    NOTE: ALL Gastric, Bariatric & Bowel surgery patients - you MUST follow your surgeon's instructions regarding eating/ drinking as you will have very specific instructions to follow. If you did not receive these, call your surgeon's office immediately. 5. MEDICATIONS:  Take the following medications with a SMALL sip of water: NONE  Use your usual dose of inhalers the morning of surgery. BRING your rescue inhaler with you to hospital.   Anesthesia does NOT want you to take insulin the morning of surgery. They will control your blood sugar while you are at the hospital. Please contact your ordering physician for instructions regarding your insulin the night before your procedure. If you have an insulin pump, please keep it set on basal rate. Bariatric patient's call your surgeon if on diabetic medications as some may need to be stopped 1 week prior to surgery    6. Do not swallow additional water when brushing teeth. No gum, candy, mints, or ice chips. Refrain from smoking or at least decrease the amount on day of surgery. 7. Morning of surgery:   Take a shower with an antibacterial soap (i.e., Safeguard or Dial) OR your physician may have instructed you to use Hibiclens. Dress in loose, comfortable clothing appropriate for redressing after your procedure. Do not wear jewelry (including body piercings), make-up (especially NO eye make-up), fingernail polish (NO toenail polish if foot/leg surgery), lotion, powders, or metal hairclips. Do not shave or wax for 72 hours prior to procedure near your operative site. Shaving with a razor can irritate your skin and make it easier to develop an infection. On the day of your procedure, any hair that needs to be removed near the surgical site will be 'clipped' by a healthcare worker using a special clipper designed to avoid skin irritation. 8. Dentures, glasses, or contacts will need to be removed before your procedure. Bring cases for your glasses, contacts, dentures, or hearing aids to protect them while you are in surgery.       9. If you use a CPAP, please bring it with you on the day of your procedure. 10. We recommend that valuable personal belongings such as cash, cell phones, e-tablets, or jewelry, be left at home during your stay. The hospital will not be responsible for valuables that are not secured in the hospital safe. However, if your insurance requires a co-pay, you may want to bring a method of payment, i.e., Check or credit card, if you wish to pay your co-pay the day of surgery. 11. If you are to stay overnight, you may bring a bag with personal items. Please have any large items you may need brought in by your family after your arrival to your hospital room. 12. If you have a Living Will or Durable Power of , please bring a copy on the day of your procedure. How we keep you safe and work to prevent surgical site infections:   1. Health care workers should always check your ID bracelet to verify your name and birth date. You will be asked many times to state your name, date of birth, and allergies. 2. Health care workers should always clean their hands with soap or alcohol gel before providing care to you. It is okay to ask anyone if they cleaned their hands before they touch you. 3. You will be actively involved in verifying the type of procedure you are having and ensuring the correct surgical site. This will be confirmed multiple times prior to your procedure. Do NOT elizabeth your surgery site UNLESS instructed to by your surgeon. 4. When you are in the operating room, your surgical site will be cleansed with a special soap, and in most cases, you will be given an antibiotic before the surgery begins. What to expect AFTER your procedure? 1. Immediately following your procedure, your will be taken to the PACU for the first phase of your recovery. Your nurse will help you recover from any potential side effects of anesthesia, such as extreme drowsiness, changes in your vital signs or breathing patterns.  Nausea, headache, muscle aches, or sore throat may also occur after anesthesia. Your nurse will help you manage these potential side effects. 2. For comfort and safety, arrange to have someone at home with you for the first 24 hours after discharge. 3. You and your family will be given written instructions about your diet, activity, dressing care, medications, and return visits. 4. Once at home, should issues with nausea, pain, or bleeding occur, or should you notice any signs of infection, you should call your surgeon. 5. Always clean your hands before and after caring for your wound. Do not let your family touch your surgery site without cleaning their hands. 6. Narcotic pain medications can cause significant constipation. You may want to add a stool softener to your postoperative medication schedule or speak to your surgeon on how best to manage this SIDE EFFECT. SPECIAL INSTRUCTIONS     Thank you for allowing us to care for you. We strive to exceed your expectations in the delivery of care and service provided to you and your family. If you need to contact the [place] [address] staff for any reason, please call us at [phone]    Instructions reviewed with patient during preadmission testing phone interview.   Carmen Najera RN.2/10/2023 .12:50 PM      ADDITIONAL EDUCATIONAL INFORMATION REVIEWED PER PHONE WITH YOU AND/OR YOUR FAMILY:  No Hibiclens® Bathing Instructions   Yes Antibacterial Soap

## 2023-02-10 NOTE — PROGRESS NOTES
Place patient label inside box (if no patient label, complete below)  Name:  :  MR#:   [address] / PROCEDURE  I (we), AYSHA LOWE (Patient Name) authorize DR Jim Chris (Provider / Francesca De Guzman) and/or such assistants as may be selected by him/her, to perform the following operation/procedure(s): REMOVE PORT       Note: If unable to obtain consent prior to an emergent procedure, document the emergent reason in the medical record. This procedure has been explained to my (our) satisfaction and included in the explanation was: The intended benefit, nature, and extent of the procedure to be performed; The significant risks involved and the probability of success; Alternative procedures and methods of treatment; The dangers and probable consequences of such alternatives (including no procedure or treatment); The expected consequences of the procedure on my future health; Whether other qualified individuals would be performing important surgical tasks and/or whether  would be present to advise or support the procedure. I (we) understand that there are other risks of infection and other serious complications in the pre-operative/procedural and postoperative/procedural stages of my (our) care. I (we) have asked all of the questions which I (we) thought were important in deciding whether or not to undergo treatment or diagnosis. These questions have been answered to my (our) satisfaction. I (we) understand that no assurance can be given that the procedure will be a success, and no guarantee or warranty of success has been given to me (us). It has been explained to me (us) that during the course of the operation/procedure, unforeseen conditions may be revealed that necessitate extension of the original procedure(s) or different procedure(s) than those set forth in Paragraph 1.  I (we) authorize and request that the above-named physician, his/her assistants or his/her designees, perform procedures as necessary and desirable if deemed to be in my (our) best interest.     Revised 8/2/2021                                                                          Page 1 of 2         I acknowledge that health care personnel may be observing this procedure for the purpose of medical education or other specified purposes as may be necessary as requested and/or approved by my (our) physician. I (we) consent to the disposal by the hospital Pathologist of the removed tissue, parts or organs in accordance with hospital policy. I do ____ do not ____ consent to the use of a local infiltration pain blocking agent that will be used by my provider/surgical provider to help alleviate pain during my procedure. I do ____ do not ____ consent to an emergent blood transfusion in the case of a life-threatening situation that requires blood components to be administered. This consent is valid for 24 hours from the beginning of the procedure. This patient does ____ or does not ____ currently have a DNR status/order. If DNR order is in place, obtain Addendum to the Surgical Consent for ALL Patients with a DNR Order to address zee-operative status for limited intervention or DNR suspension.      I have read and fully understand the above Consent for Operation/Procedure and that all blanks were completed before I signed the consent.   _____________________________       _____________________      ____/____am/pm  Signature of Patient or legal representative      Printed Name / Relationship            Date / Time   ____________________________       _____________________      ____/____am/pm  Witness to Signature                                    Printed Name                    Date / Time    If patient is unable to sign or is a minor, complete the following)  Patient is a minor, ____ years of age, or unable to sign because: ______________________________________________________________________________________________    If a phone consent is obtained, consent will be documented by using two health care professionals, each affirming that the consenting party has no questions and gives consent for the procedure discussed with the physician/provider.   _____________________          ____________________       _____/_____am/pm   2nd witness to phone consent        Printed name           Date / Time    Informed Consent:  I have provided the explanation described above in section 1 to the patient and/or legal representative.  I have provided the patient and/or legal representative with an opportunity to ask any questions about the proposed operation/procedure.   ___________________________          ____________________         ____/____am/pm  Provider / Proceduralist                            Printed name            Date / Time  Revised 8/2/2021                                                                      Page 2 of 2

## 2023-02-20 ENCOUNTER — TELEPHONE (OUTPATIENT)
Dept: SURGERY | Age: 53
End: 2023-02-20

## 2023-02-20 ENCOUNTER — ANESTHESIA EVENT (OUTPATIENT)
Dept: OPERATING ROOM | Age: 53
End: 2023-02-20
Payer: COMMERCIAL

## 2023-02-21 ENCOUNTER — ANESTHESIA (OUTPATIENT)
Dept: OPERATING ROOM | Age: 53
End: 2023-02-21
Payer: COMMERCIAL

## 2023-02-21 ENCOUNTER — HOSPITAL ENCOUNTER (OUTPATIENT)
Age: 53
Setting detail: OUTPATIENT SURGERY
Discharge: HOME | End: 2023-02-21
Attending: SURGERY | Admitting: SURGERY
Payer: COMMERCIAL

## 2023-02-21 VITALS
BODY MASS INDEX: 28.79 KG/M2 | OXYGEN SATURATION: 98 % | DIASTOLIC BLOOD PRESSURE: 72 MMHG | HEIGHT: 68 IN | HEART RATE: 79 BPM | SYSTOLIC BLOOD PRESSURE: 110 MMHG | WEIGHT: 190 LBS | RESPIRATION RATE: 16 BRPM | TEMPERATURE: 96.8 F

## 2023-02-21 DIAGNOSIS — C20 RECTAL CANCER (HCC): ICD-10-CM

## 2023-02-21 PROBLEM — Z95.828 PORT-A-CATH IN PLACE: Status: ACTIVE | Noted: 2023-02-21

## 2023-02-21 LAB — PREGNANCY, URINE: NEGATIVE

## 2023-02-21 PROCEDURE — 7100000011 HC PHASE II RECOVERY - ADDTL 15 MIN: Performed by: SURGERY

## 2023-02-21 PROCEDURE — 2580000003 HC RX 258: Performed by: SURGERY

## 2023-02-21 PROCEDURE — 6360000002 HC RX W HCPCS: Performed by: SURGERY

## 2023-02-21 PROCEDURE — 6360000002 HC RX W HCPCS: Performed by: NURSE ANESTHETIST, CERTIFIED REGISTERED

## 2023-02-21 PROCEDURE — 3700000001 HC ADD 15 MINUTES (ANESTHESIA): Performed by: SURGERY

## 2023-02-21 PROCEDURE — 2709999900 HC NON-CHARGEABLE SUPPLY: Performed by: SURGERY

## 2023-02-21 PROCEDURE — 7100000001 HC PACU RECOVERY - ADDTL 15 MIN: Performed by: SURGERY

## 2023-02-21 PROCEDURE — A4217 STERILE WATER/SALINE, 500 ML: HCPCS | Performed by: SURGERY

## 2023-02-21 PROCEDURE — 3700000000 HC ANESTHESIA ATTENDED CARE: Performed by: SURGERY

## 2023-02-21 PROCEDURE — 88300 SURGICAL PATH GROSS: CPT

## 2023-02-21 PROCEDURE — 84703 CHORIONIC GONADOTROPIN ASSAY: CPT

## 2023-02-21 PROCEDURE — 2580000003 HC RX 258: Performed by: ANESTHESIOLOGY

## 2023-02-21 PROCEDURE — 3600000012 HC SURGERY LEVEL 2 ADDTL 15MIN: Performed by: SURGERY

## 2023-02-21 PROCEDURE — 7100000000 HC PACU RECOVERY - FIRST 15 MIN: Performed by: SURGERY

## 2023-02-21 PROCEDURE — 7100000010 HC PHASE II RECOVERY - FIRST 15 MIN: Performed by: SURGERY

## 2023-02-21 PROCEDURE — 2500000003 HC RX 250 WO HCPCS: Performed by: SURGERY

## 2023-02-21 PROCEDURE — 36590 REMOVAL TUNNELED CV CATH: CPT | Performed by: SURGERY

## 2023-02-21 PROCEDURE — 3600000002 HC SURGERY LEVEL 2 BASE: Performed by: SURGERY

## 2023-02-21 PROCEDURE — 2580000003 HC RX 258: Performed by: NURSE ANESTHETIST, CERTIFIED REGISTERED

## 2023-02-21 RX ORDER — PROPOFOL 10 MG/ML
INJECTION, EMULSION INTRAVENOUS PRN
Status: DISCONTINUED | OUTPATIENT
Start: 2023-02-21 | End: 2023-02-21 | Stop reason: SDUPTHER

## 2023-02-21 RX ORDER — SODIUM CHLORIDE 9 MG/ML
INJECTION, SOLUTION INTRAVENOUS PRN
Status: DISCONTINUED | OUTPATIENT
Start: 2023-02-21 | End: 2023-02-21 | Stop reason: HOSPADM

## 2023-02-21 RX ORDER — ONDANSETRON 2 MG/ML
4 INJECTION INTRAMUSCULAR; INTRAVENOUS
Status: DISCONTINUED | OUTPATIENT
Start: 2023-02-21 | End: 2023-02-21 | Stop reason: HOSPADM

## 2023-02-21 RX ORDER — FENTANYL CITRATE 50 UG/ML
25 INJECTION, SOLUTION INTRAMUSCULAR; INTRAVENOUS EVERY 5 MIN PRN
Status: DISCONTINUED | OUTPATIENT
Start: 2023-02-21 | End: 2023-02-21 | Stop reason: HOSPADM

## 2023-02-21 RX ORDER — LIDOCAINE HYDROCHLORIDE 20 MG/ML
INJECTION, SOLUTION INTRAVENOUS PRN
Status: DISCONTINUED | OUTPATIENT
Start: 2023-02-21 | End: 2023-02-21 | Stop reason: SDUPTHER

## 2023-02-21 RX ORDER — SODIUM CHLORIDE, SODIUM LACTATE, POTASSIUM CHLORIDE, CALCIUM CHLORIDE 600; 310; 30; 20 MG/100ML; MG/100ML; MG/100ML; MG/100ML
INJECTION, SOLUTION INTRAVENOUS CONTINUOUS PRN
Status: DISCONTINUED | OUTPATIENT
Start: 2023-02-21 | End: 2023-02-21 | Stop reason: SDUPTHER

## 2023-02-21 RX ORDER — SODIUM CHLORIDE 0.9 % (FLUSH) 0.9 %
5-40 SYRINGE (ML) INJECTION PRN
Status: DISCONTINUED | OUTPATIENT
Start: 2023-02-21 | End: 2023-02-21 | Stop reason: HOSPADM

## 2023-02-21 RX ORDER — SODIUM CHLORIDE, SODIUM LACTATE, POTASSIUM CHLORIDE, CALCIUM CHLORIDE 600; 310; 30; 20 MG/100ML; MG/100ML; MG/100ML; MG/100ML
INJECTION, SOLUTION INTRAVENOUS CONTINUOUS
Status: DISCONTINUED | OUTPATIENT
Start: 2023-02-21 | End: 2023-02-21 | Stop reason: HOSPADM

## 2023-02-21 RX ORDER — MIDAZOLAM HYDROCHLORIDE 1 MG/ML
INJECTION INTRAMUSCULAR; INTRAVENOUS PRN
Status: DISCONTINUED | OUTPATIENT
Start: 2023-02-21 | End: 2023-02-21 | Stop reason: SDUPTHER

## 2023-02-21 RX ORDER — LABETALOL HYDROCHLORIDE 5 MG/ML
10 INJECTION, SOLUTION INTRAVENOUS
Status: DISCONTINUED | OUTPATIENT
Start: 2023-02-21 | End: 2023-02-21 | Stop reason: HOSPADM

## 2023-02-21 RX ORDER — PROCHLORPERAZINE EDISYLATE 5 MG/ML
5 INJECTION INTRAMUSCULAR; INTRAVENOUS
Status: DISCONTINUED | OUTPATIENT
Start: 2023-02-21 | End: 2023-02-21 | Stop reason: HOSPADM

## 2023-02-21 RX ORDER — MAGNESIUM HYDROXIDE 1200 MG/15ML
LIQUID ORAL CONTINUOUS PRN
Status: DISCONTINUED | OUTPATIENT
Start: 2023-02-21 | End: 2023-02-21 | Stop reason: HOSPADM

## 2023-02-21 RX ORDER — MEPERIDINE HYDROCHLORIDE 25 MG/ML
12.5 INJECTION INTRAMUSCULAR; INTRAVENOUS; SUBCUTANEOUS EVERY 5 MIN PRN
Status: DISCONTINUED | OUTPATIENT
Start: 2023-02-21 | End: 2023-02-21 | Stop reason: HOSPADM

## 2023-02-21 RX ORDER — IPRATROPIUM BROMIDE AND ALBUTEROL SULFATE 2.5; .5 MG/3ML; MG/3ML
1 SOLUTION RESPIRATORY (INHALATION)
Status: DISCONTINUED | OUTPATIENT
Start: 2023-02-21 | End: 2023-02-21 | Stop reason: HOSPADM

## 2023-02-21 RX ORDER — BUPIVACAINE HYDROCHLORIDE AND EPINEPHRINE 5; 5 MG/ML; UG/ML
INJECTION, SOLUTION EPIDURAL; INTRACAUDAL; PERINEURAL PRN
Status: DISCONTINUED | OUTPATIENT
Start: 2023-02-21 | End: 2023-02-21 | Stop reason: HOSPADM

## 2023-02-21 RX ORDER — FENTANYL CITRATE 50 UG/ML
INJECTION, SOLUTION INTRAMUSCULAR; INTRAVENOUS PRN
Status: DISCONTINUED | OUTPATIENT
Start: 2023-02-21 | End: 2023-02-21 | Stop reason: SDUPTHER

## 2023-02-21 RX ORDER — HYDRALAZINE HYDROCHLORIDE 20 MG/ML
10 INJECTION INTRAMUSCULAR; INTRAVENOUS
Status: DISCONTINUED | OUTPATIENT
Start: 2023-02-21 | End: 2023-02-21 | Stop reason: HOSPADM

## 2023-02-21 RX ORDER — SODIUM CHLORIDE 0.9 % (FLUSH) 0.9 %
5-40 SYRINGE (ML) INJECTION EVERY 12 HOURS SCHEDULED
Status: DISCONTINUED | OUTPATIENT
Start: 2023-02-21 | End: 2023-02-21 | Stop reason: HOSPADM

## 2023-02-21 RX ADMIN — MIDAZOLAM HYDROCHLORIDE 2 MG: 2 INJECTION, SOLUTION INTRAMUSCULAR; INTRAVENOUS at 07:09

## 2023-02-21 RX ADMIN — FENTANYL CITRATE 25 MCG: 50 INJECTION, SOLUTION INTRAMUSCULAR; INTRAVENOUS at 07:33

## 2023-02-21 RX ADMIN — CEFAZOLIN 2000 MG: 2 INJECTION, POWDER, FOR SOLUTION INTRAMUSCULAR; INTRAVENOUS at 07:11

## 2023-02-21 RX ADMIN — FENTANYL CITRATE 25 MCG: 50 INJECTION, SOLUTION INTRAMUSCULAR; INTRAVENOUS at 07:23

## 2023-02-21 RX ADMIN — SODIUM CHLORIDE, SODIUM LACTATE, POTASSIUM CHLORIDE, AND CALCIUM CHLORIDE: .6; .31; .03; .02 INJECTION, SOLUTION INTRAVENOUS at 07:06

## 2023-02-21 RX ADMIN — PROPOFOL 100 MG: 10 INJECTION, EMULSION INTRAVENOUS at 07:18

## 2023-02-21 RX ADMIN — LIDOCAINE HYDROCHLORIDE 100 MG: 20 INJECTION, SOLUTION INTRAVENOUS at 07:14

## 2023-02-21 RX ADMIN — PROPOFOL 100 MG: 10 INJECTION, EMULSION INTRAVENOUS at 07:27

## 2023-02-21 RX ADMIN — PROPOFOL 100 MG: 10 INJECTION, EMULSION INTRAVENOUS at 07:38

## 2023-02-21 RX ADMIN — SODIUM CHLORIDE, POTASSIUM CHLORIDE, SODIUM LACTATE AND CALCIUM CHLORIDE: 600; 310; 30; 20 INJECTION, SOLUTION INTRAVENOUS at 06:39

## 2023-02-21 RX ADMIN — PROPOFOL 100 MG: 10 INJECTION, EMULSION INTRAVENOUS at 07:48

## 2023-02-21 RX ADMIN — FENTANYL CITRATE 50 MCG: 50 INJECTION, SOLUTION INTRAMUSCULAR; INTRAVENOUS at 07:11

## 2023-02-21 ASSESSMENT — PAIN - FUNCTIONAL ASSESSMENT: PAIN_FUNCTIONAL_ASSESSMENT: 0-10

## 2023-02-21 ASSESSMENT — PAIN SCALES - GENERAL: PAINLEVEL_OUTOF10: 0

## 2023-02-21 NOTE — ANESTHESIA POSTPROCEDURE EVALUATION
Department of Anesthesiology  Postprocedure Note    Patient: Arabella Boas  MRN: [id number]  YOB: 1970  Date of evaluation: 2/21/2023      Procedure Summary     Date: 02/21/23 Room / Location: [address] [place] [address] Route [facility] 06 / [facility]    Anesthesia Start: 0710 Anesthesia Stop: 0806    Procedure: REMOVE PORT (Chest) Diagnosis:       Rectal cancer ([address]      (Rectal cancer ([address] [C20])    Surgeons: Roz Vides MD Responsible Provider: Martha Ovalle DO    Anesthesia Type: MAC ASA Status: 3          Anesthesia Type: No value filed.     Patricia Phase I: Patricia Score: 10    Patricia Phase II:        Anesthesia Post Evaluation    Patient location during evaluation: PACU  Patient participation: complete - patient participated  Level of consciousness: awake  Pain score: 0  Airway patency: patent  Nausea & Vomiting: no nausea and no vomiting  Complications: no  Cardiovascular status: hemodynamically stable  Respiratory status: acceptable  Hydration status: stable  Multimodal analgesia pain management approach

## 2023-02-21 NOTE — OP NOTE
OPERATIVE NOTE     Eladia Armenta  1970  [id number]    [facility]    DATE OF PROCEDURE: 02/21/23    PRE-OPERATIVE DIAGNOSIS: Left sided portacath in place    POST-OPERATIVE DIAGNOSIS: Left sided portacath in place    PROCEDURE: Tunneled left CVC/port removal    SURGEON: MD Jeison Brandon MD, PGY-1 resident    ANESTHESIA: MAC + local    EBL: minimal    INDICATIONS:  Port in place, advised removal by patient's oncologist. Risks of surgery explained to patient and family in preoperative area, including bleeding, infection, need for further procedures, and risks of anesthesia. All questions were answered. PROCEDURE DETAILS:  Patient was brought to the operating theater and placed supine. Sedation was started by anesthesia. Arms were tucked at sides. Left neck and upper chest prepped and draped in sterile fashion using chlorhexidine prep solution. Time out was performed confirming the correct patient, procedure, correct side, and IV antibiotic infusion. SCDs were on and functioning. 6 cc of local anesthetic was used over previous port placement incision. 15 blade scalpel used to incise over previous skin scar, approximately 3 cm. Cautery used to dissect through subcutaneous tissue until the port was reached. Port was grasped and manipulated, freeing it from surrounding tissues. Previous placed prolene sutures were cut as necessary. After catheter was confirmed free from surrounding tissue, it was removed in one piece. It will be sent for pathology gross inspection. Catheter tract closed off with 3-0 vicryl figure of eight. The capsule was cauterized. The cavity was thoroughly irrigated with saline. The wound was closed in 2 layers, with interrupted 3-0 vicryl for deeper tissues and 4-0 Monocryl running subcuticular for the skin. Dermabond was used for sterile dressing. Patient tolerated the procedure well without complication.  Patient was woken up and brought to the PACU in stable condition. All counts were correct at the end of the procedure. No complications.

## 2023-02-21 NOTE — H&P
PRE-OP/PRE-PROCEDURE H&P    Visit Date: 2/21/2023    History:     Bharati Holbrook is a 46 y.o. female who presents today for procedure. See my/PCP/oncologist office notes for indications and details. Patient Active Problem List:     Rotator cuff tendinitis     Left shoulder pain     Migraines     Obesity (BMI 30-39. 9)     Low back pain     Rosacea     Chronic thumb pain, left     Scalp cyst     Neoplasm of uncertain behavior of skin     Rectal cancer (HCC)     Nummular eczema         Current Facility-Administered Medications:     ceFAZolin (ANCEF) 2,000 mg in sodium chloride 0.9 % 50 mL IVPB (mini-bag), 2,000 mg, IntraVENous, Once, Valerie Cody MD    lactated ringers IV soln infusion, , IntraVENous, Continuous, Alondra Estes, DO, Last Rate: 100 mL/hr at 02/21/23 7166, New Bag at 02/21/23 1237  Prior to Admission medications    Medication Sig Start Date End Date Taking?  Authorizing Provider   Semaglutide,0.25 or 0.5MG/DOS, (OZEMPIC, 0.25 OR 0.5 MG/DOSE,) 2 MG/1.5ML SOPN Inject into the skin once a week    Historical Provider, MD   SUMAtriptan (IMITREX) 100 MG tablet Take 100 mg by mouth once as needed for Migraine    Historical Provider, MD     Allergies   Allergen Reactions    Oxycodone-Acetaminophen Itching     Past Medical History:   Diagnosis Date    Colorectal cancer ([facility] 07/19/2022    Cornea abrasion 2020    cornea tear     Migraines     uses imitrex as needed    PONV (postoperative nausea and vomiting)      Past Surgical History:   Procedure Laterality Date    ABDOMINOPLASTY      APPENDECTOMY  1983    ARM SURGERY N/A 5/18/2020    EXCISION SCALP CYST x TWO, EXCISION SKIN LESION LEFT LEG performed by Eliud Raymond MD at [address] [place] [address]  12-5-2012    AMBER BREAST REDUCTION; ABDOMINOPLASTY    COLECTOMY N/A 8/26/2022    ROBOTIC LOW ANTERIOR RESECTION WITH COLORECTAL ANASTAMOSIS, LAPAROSCOPIC MOBILIZATION OF SPLENIC FLEXURE performed by Valerie Cody MD at [address] COLONOSCOPY N/A 7/19/2022    COLONOSCOPY WITH BIOPSY performed by Yong Roberts DO at [address] [place] [address] N/A 7/19/2022    COLONOSCOPY POLYPECTOMY SNARE/COLD BIOPSY performed by Yong Roberts DO at [facility]  7/19/2022    COLONOSCOPY SUBMUCOSAL/INK TATTOO  INJECTION performed by Yong Roberts DO at [facility] 2/6/2023    COLONOSCOPY performed by Kenyon Mir MD at [facility] TWO, EXCISION SKIN LESION LEFT LEG     DENTAL SURGERY      teeth extracted    ENDOMETRIAL ABLATION  2007    PORT SURGERY N/A 9/19/2022    INSERT PORT performed by Klever Blanchard MD at [place] [address] N/A 8/1/2022    ENDOSCOPIC ULTRASOUND OF RECTUM performed by Mayito Salas MD at [facility] ENDOSCOPY         Physical Exam:     /64   Pulse 81   Temp 96.9 °F (36.1 °C) (Temporal)   Resp 16   Ht 5' 8\" (1.727 m)   Wt 190 lb (86.2 kg)   LMP  (LMP Unknown) Comment: no period since 2012  SpO2 97%   BMI 28.89 kg/m²  Body mass index is 28.89 kg/m². Constitutional: Appears well-developed and well-nourished. Head: Normocephalic, atraumatic. Eyes: No scleral icterus. Vision intact grossly. ENT: Hearing grossly intact. No facial deformity. Neck: Normal range of motion. No tracheal deviation. Cardiovascular: Normal rate. No peripheral edema. Pulmonary/Chest: Effort normal. No respiratory distress. No wheezes. No use of accessory muscles. Musculoskeletal: No gross deformity. Neurological: Alert and oriented to person, place, and time. No gross deficits. Skin: Skin is dry. No rash noted. No pallor. Psychiatric: Normal mood and affect. Behavior normal. Oriented to person, place, and time. Abdomen: soft, NTTP, non distended    Recent labs and imaging reviewed as necessary.     Assessment/Plan:       Proceed as planned for portacath removal    Risks/benefits/alternatives of procedure/surgery discussed with patient and any present family members (or appropriate guardian) and understanding verbalized. All questions answered. Patient wishes to proceed.     Electronically signed by Johnny Elizalde MD on 2/21/2023 at 6:55 AM

## 2023-02-21 NOTE — ANESTHESIA PRE PROCEDURE
Department of Anesthesiology  Preprocedure Note       Name:  Arabella Boas   Age:  46 y.o.  :  1970                                          MRN:  [id number]         Date:  2023      Surgeon: Loren Rodriguez):  Roz Vides MD    Procedure: Procedure(s):  REMOVE PORT    Medications prior to admission:   Prior to Admission medications    Medication Sig Start Date End Date Taking? Authorizing Provider   Semaglutide,0.25 or 0.5MG/DOS, (OZEMPIC, 0.25 OR 0.5 MG/DOSE,) 2 MG/1.5ML SOPN Inject into the skin once a week    Historical Provider, MD   SUMAtriptan (IMITREX) 100 MG tablet Take 100 mg by mouth once as needed for Migraine    Historical Provider, MD       Current medications:    No current facility-administered medications for this visit. No current outpatient medications on file. Facility-Administered Medications Ordered in Other Visits   Medication Dose Route Frequency Provider Last Rate Last Admin    ceFAZolin (ANCEF) 2,000 mg in sodium chloride 0.9 % 50 mL IVPB (mini-bag)  2,000 mg IntraVENous Once Roz Vides MD        lactated ringers IV soln infusion   IntraVENous Continuous Freda Garza,  mL/hr at 23 8578 New Bag at 23 3723       Allergies: Allergies   Allergen Reactions    Oxycodone-Acetaminophen Itching       Problem List:    Patient Active Problem List   Diagnosis Code    Rotator cuff tendinitis M75.80    Left shoulder pain M25.512    Migraines G43.909    Obesity (BMI 30-39. 9) E66.9    Low back pain M54.50    Rosacea L71.9    Chronic thumb pain, left M79.645, G89.29    Scalp cyst L72.9    Neoplasm of uncertain behavior of skin D48.5    Rectal cancer (HCC) C20    Nummular eczema L30.0       Past Medical History:        Diagnosis Date    Colorectal cancer ([address] 2022    Cornea abrasion 2020    cornea tear     Migraines     uses imitrex as needed    PONV (postoperative nausea and vomiting)        Past Surgical History: Procedure Laterality Date    ABDOMINOPLASTY      APPENDECTOMY      ARM SURGERY N/A 2020    EXCISION SCALP CYST x TWO, EXCISION SKIN LESION LEFT LEG performed by Katherin Diallo MD at [address] [place] [address]  2012    AMBER BREAST REDUCTION; ABDOMINOPLASTY    COLECTOMY N/A 2022    ROBOTIC LOW ANTERIOR RESECTION WITH COLORECTAL ANASTAMOSIS, LAPAROSCOPIC MOBILIZATION OF SPLENIC FLEXURE performed by Bart Shen MD at [address] COLONOSCOPY N/A 2022    COLONOSCOPY WITH BIOPSY performed by Marla Serrato DO at [facility] N/A 2022    COLONOSCOPY POLYPECTOMY SNARE/COLD BIOPSY performed by Marla Serrato DO at [facility]  2022    COLONOSCOPY SUBMUCOSAL/INK TATTOO  INJECTION performed by Marla Serrato DO at [facility] N/A 2023    COLONOSCOPY performed by Shea Hanna MD at [facility] x TWO, EXCISION SKIN LESION LEFT LEG     DENTAL SURGERY      teeth extracted    ENDOMETRIAL ABLATION      PORT SURGERY N/A 2022    INSERT PORT performed by Bart Shen MD at [address] N/A 2022    ENDOSCOPIC ULTRASOUND OF RECTUM performed by Mayito Santiago MD at [facility] ENDOSCOPY       Social History:    Social History     Tobacco Use    Smoking status: Former     Types: Cigarettes     Start date: 1988     Quit date: 1995     Years since quittin.1    Smokeless tobacco: Never   Substance Use Topics    Alcohol use: Yes     Comment: rarely                                Counseling given: Not Answered      Vital Signs (Current): There were no vitals filed for this visit.                                            BP Readings from Last 3 Encounters:   23 122/64   23 102/69   22 110/70       NPO Status:                                                                                 BMI: Wt Readings from Last 3 Encounters:   02/21/23 190 lb (86.2 kg)   02/01/23 180 lb (81.6 kg)   09/19/22 198 lb (89.8 kg)     There is no height or weight on file to calculate BMI.    CBC:   Lab Results   Component Value Date/Time    WBC 16.0 08/27/2022 05:23 AM    RBC 4.70 08/27/2022 05:23 AM    HGB 13.4 08/27/2022 05:23 AM    HCT 39.4 08/27/2022 05:23 AM    MCV 83.8 08/27/2022 05:23 AM    RDW 14.4 08/27/2022 05:23 AM     08/27/2022 05:23 AM       CMP:   Lab Results   Component Value Date/Time     08/27/2022 05:23 AM    K 4.5 08/27/2022 05:23 AM    K 4.3 08/26/2022 12:10 PM     08/27/2022 05:23 AM    CO2 20 08/27/2022 05:23 AM    BUN 8 08/27/2022 05:23 AM    CREATININE 0.6 08/27/2022 05:23 AM    GFRAA >60 08/27/2022 05:23 AM    GFRAA >60 09/20/2012 09:37 PM    AGRATIO 1.5 10/31/2018 09:29 AM    LABGLOM >60 08/27/2022 05:23 AM    GLUCOSE 119 08/27/2022 05:23 AM    PROT 7.5 10/31/2018 09:29 AM    PROT 7.5 09/20/2012 09:37 PM    CALCIUM 9.2 08/27/2022 05:23 AM    BILITOT 0.4 10/31/2018 09:29 AM    ALKPHOS 93 10/31/2018 09:29 AM    AST 24 10/31/2018 09:29 AM    ALT 28 10/31/2018 09:29 AM       POC Tests: No results for input(s): POCGLU, POCNA, POCK, POCCL, POCBUN, POCHEMO, POCHCT in the last 72 hours. Coags: No results found for: PROTIME, INR, APTT    HCG (If Applicable):   Lab Results   Component Value Date    PREGTESTUR Negative 02/21/2023        ABGs: No results found for: PHART, PO2ART, YMC1JTR, GIC1GQY, BEART, C3XSSUSM     Type & Screen (If Applicable):  Lab Results   Component Value Date    LABABO B 11/05/2012    [facility] MICAH Positive 11/05/2012       Drug/Infectious Status (If Applicable):  No results found for: HIV, HEPCAB    COVID-19 Screening (If Applicable):   Lab Results   Component Value Date/Time    COVID19 Not Detected 05/14/2020 03:47 PM           Anesthesia Evaluation  Patient summary reviewed and Nursing notes reviewed   history of anesthetic complications: PONV.   Airway: Mallampati: III  TM distance: >3 FB   Neck ROM: full  Mouth opening: > = 3 FB   Dental: normal exam         Pulmonary:Negative Pulmonary ROS and normal exam  breath sounds clear to auscultation                             Cardiovascular:Negative CV ROS  Exercise tolerance: good (>4 METS),           Rhythm: regular  Rate: normal                    Neuro/Psych:   (+) headaches: migraine headaches,             GI/Hepatic/Renal: Neg GI/Hepatic/Renal ROS            Endo/Other:    (+) malignancy/cancer (Rectal Cancer). Abdominal:             Vascular: negative vascular ROS. Other Findings:             Anesthesia Plan      MAC     ASA 3       Induction: intravenous. Anesthetic plan and risks discussed with patient. Plan discussed with CRNA.     Attending anesthesiologist reviewed and agrees with Preprocedure content                Johnnie Crandall DO   2/21/2023

## 2023-02-21 NOTE — PROGRESS NOTES
Ambulatory Surgery/Procedure Discharge Note    Vitals:    02/21/23 0845   BP: 110/72   Pulse: 79   Resp: 16   Temp: 96.8 °F (36 °C)   SpO2: 98%       In: 500 [I.V.:500]  Out: 25     Restroom use offered before discharge. Yes    Pain assessment:  none  Pain Level: 0    Pt a&o x4. VSS. Pt denies pain and nausea. Surgical site is C/D/I w/ surgical glue. Reviewed D/C instructions. IV removed. Patient discharged to home/self care.  Patient discharged via wheel chair by transporter to waiting family/S.O.       2/21/2023 9:07 AM

## 2023-02-21 NOTE — DISCHARGE INSTRUCTIONS
Discharge Instructions:    Diet:   You may resume a regular diet. Wound Care:   Skin glue was used to cover your incision(s). It will fall off on its own in about 10 days. You may shower, but do not scrub the incision sites directly or soak (tub, pool, etc.). Activity:   You may resume your normal activities. Pain management:   Unless informed of any restrictions by your primary care physician, please use your preferred over-the-counter pain reliever as your primary pain medication. Return Precautions:   Call/ Return to ED for increased redness, worsening pain, drainage from wound, fevers, or any other concerns about your incision or post op course. Follow up with Dr. Niraj Mccallum as needed. Please call [phone] for any questions or concerns. [facility]    There are potential side effects of anesthesia or sedation you may experience for the first 24 hours. These side effects include:    Confusion or Memory loss, Dizziness, or Delayed Reaction Times   [x]A responsible person should be with you for the next 24 hours. Do not operate any vehicles (automobiles, bicycles, motorcycles) or power tools or machinery for 24 hours. Do not sign any legal documents or make any legal decisions for 24 hours. Do not drink alcohol for 24 hours or while taking narcotic pain medication. Nausea    [x]Start with light diet and progress to your normal diet as you feel like eating. However, if you experience nausea or repeated episodes of vomiting which persist beyond 12-24 hours, notify your physician. Once nausea has passed, remember to keep drinking fluids. Difficulty Passing Urine  [x]Drink extra amounts of fluid today. Notify your physician if you have not urinated within 8 hours after your procedure or you feel uncomfortable. Irritated Throat from a Breathing Tube  [x]Drink extra amounts of fluid today. Lozenges may help.     Muscle Aches  [x]You may experience some generalized body aches as your muscles recover from medications used to relax them during surgery. These will gradually subside. MEDICATION INSTRUCTIONS:  [x]Prescription(S) x none sent with you. Use as directed. When taking pain medications, you may experience the side effect of dizziness or drowsiness. Do not drink alcohol or drive when taking these medications. []Prescription(S) x          Called to Pharmacy Name and location:    [x]Give the list of your medications to your primary care physician on your next visit. Keep your med list updated and carry it with in case of emergencies. [] Narcotic pain medications can cause the side effect of significant constipation. You may want to add a stool softener to your postoperative medication schedule or speak to your surgeon on how best to manage this side effect. NARCOTIC SAFETY:  Your pain medicine is only for you to take. Safely store your medicines. Store pills up high and out of reach of children and pets. Ensure safety caps are snapped tightly  Keep track of how many pills you have left    Unused medication can be disposed of by taking them to a drop-off box or take-back program that is authorized by the [facility] ([place], [place]). Access to a site near you can be found on the [facility] [employer] website ([address] [facility].gov). If you have a CPAP machine, it is very important that you use it daily during all periods of sleep and daytime rest during your recovery at home. Surgery and Anesthesia place a significant amount of stress on your body. Using your CPAP will help keep you safe and lessen the negative effects of that stress. FOLLOW-UP RECOVERY CARE:  [x]Call the office of Dr Elyssa Valdivia for follow-up appointment and problems    Watch for these possible complications, symptoms, or side effects of anesthesia.   Call physician if they or any other problems occur:  Signs of INFECTION   > Fever over 101°     > Redness, swelling, hardness or warmth at the operative site   >Foul smelling or cloudy drainage at the operative site   Unrelieved PAIN  Unrelieved NAUSEA  Blood soaked dressing. (Some oozing may be normal)  Inability to urinate      Numb, pale, blue, cold or tingling extremity      Physician:  mariel    The above instructions were reviewed with patient/significant other. The following additional patient specific information was reviewed with the patient/significant other:  [x]Procedure/physician specific instructions  []Medication information sheet(S) including potential side effects  []Natachas egress test  []Pain Ball management  []FAQ Catheter associated blood stream infections  [x]FAQ Surgical Site Infections  []Other-    I have read and understand the instructions given to me: ____________________________________________   (Patient/S.O. Signature)            Date/time 2/21/2023 8:22 AM         PACU:  [phone]   M-F 700 AM - 7 PM      SAME DAY SERVICES:  [phone] M-F 7AM-6PM        If you smoke STOP. We care about your health!

## 2023-04-27 ENCOUNTER — TELEPHONE (OUTPATIENT)
Dept: PRIMARY CARE CLINIC | Age: 53
End: 2023-04-27

## 2023-06-26 ENCOUNTER — HOSPITAL ENCOUNTER (OUTPATIENT)
Dept: CT IMAGING | Age: 53
Discharge: HOME | End: 2023-06-26
Payer: COMMERCIAL

## 2023-06-26 DIAGNOSIS — C20 MALIGNANT NEOPLASM OF RECTUM (HCC): ICD-10-CM

## 2023-06-26 PROCEDURE — 74177 CT ABD & PELVIS W/CONTRAST: CPT

## 2023-06-26 PROCEDURE — 6360000004 HC RX CONTRAST MEDICATION: Performed by: NURSE PRACTITIONER

## 2023-06-26 RX ADMIN — IOPAMIDOL 75 ML: 755 INJECTION, SOLUTION INTRAVENOUS at 15:02

## 2023-06-26 RX ADMIN — DIATRIZOATE MEGLUMINE AND DIATRIZOATE SODIUM 12 ML: 660; 100 LIQUID ORAL; RECTAL at 15:02

## 2023-07-10 ENCOUNTER — ANESTHESIA EVENT (OUTPATIENT)
Dept: ENDOSCOPY | Age: 53
End: 2023-07-10
Payer: COMMERCIAL

## 2023-07-11 ENCOUNTER — HOSPITAL ENCOUNTER (OUTPATIENT)
Age: 53
Setting detail: OUTPATIENT SURGERY
Discharge: HOME | End: 2023-07-11
Attending: INTERNAL MEDICINE | Admitting: INTERNAL MEDICINE
Payer: COMMERCIAL

## 2023-07-11 ENCOUNTER — ANESTHESIA (OUTPATIENT)
Dept: ENDOSCOPY | Age: 53
End: 2023-07-11
Payer: COMMERCIAL

## 2023-07-11 VITALS
DIASTOLIC BLOOD PRESSURE: 74 MMHG | HEIGHT: 68 IN | SYSTOLIC BLOOD PRESSURE: 120 MMHG | OXYGEN SATURATION: 97 % | WEIGHT: 184 LBS | HEART RATE: 87 BPM | TEMPERATURE: 97.1 F | RESPIRATION RATE: 17 BRPM | BODY MASS INDEX: 27.89 KG/M2

## 2023-07-11 LAB — HCG UR QL: NEGATIVE

## 2023-07-11 PROCEDURE — 2709999900 HC NON-CHARGEABLE SUPPLY: Performed by: INTERNAL MEDICINE

## 2023-07-11 PROCEDURE — 84703 CHORIONIC GONADOTROPIN ASSAY: CPT

## 2023-07-11 PROCEDURE — 7100000010 HC PHASE II RECOVERY - FIRST 15 MIN: Performed by: INTERNAL MEDICINE

## 2023-07-11 PROCEDURE — 7100000011 HC PHASE II RECOVERY - ADDTL 15 MIN: Performed by: INTERNAL MEDICINE

## 2023-07-11 PROCEDURE — 2580000003 HC RX 258: Performed by: ANESTHESIOLOGY

## 2023-07-11 PROCEDURE — 3700000000 HC ANESTHESIA ATTENDED CARE: Performed by: INTERNAL MEDICINE

## 2023-07-11 PROCEDURE — 6360000002 HC RX W HCPCS: Performed by: NURSE ANESTHETIST, CERTIFIED REGISTERED

## 2023-07-11 PROCEDURE — 3700000001 HC ADD 15 MINUTES (ANESTHESIA): Performed by: INTERNAL MEDICINE

## 2023-07-11 PROCEDURE — 3609020100 HC COLONOSCOPY W/EUS FNA: Performed by: INTERNAL MEDICINE

## 2023-07-11 PROCEDURE — 2500000003 HC RX 250 WO HCPCS: Performed by: NURSE ANESTHETIST, CERTIFIED REGISTERED

## 2023-07-11 RX ORDER — LIDOCAINE HYDROCHLORIDE 20 MG/ML
INJECTION, SOLUTION INFILTRATION; PERINEURAL PRN
Status: DISCONTINUED | OUTPATIENT
Start: 2023-07-11 | End: 2023-07-11 | Stop reason: SDUPTHER

## 2023-07-11 RX ORDER — SODIUM CHLORIDE, SODIUM LACTATE, POTASSIUM CHLORIDE, CALCIUM CHLORIDE 600; 310; 30; 20 MG/100ML; MG/100ML; MG/100ML; MG/100ML
INJECTION, SOLUTION INTRAVENOUS CONTINUOUS
Status: DISCONTINUED | OUTPATIENT
Start: 2023-07-11 | End: 2023-07-11 | Stop reason: HOSPADM

## 2023-07-11 RX ORDER — PROPOFOL 10 MG/ML
INJECTION, EMULSION INTRAVENOUS PRN
Status: DISCONTINUED | OUTPATIENT
Start: 2023-07-11 | End: 2023-07-11 | Stop reason: SDUPTHER

## 2023-07-11 RX ADMIN — PROPOFOL 100 MG: 10 INJECTION, EMULSION INTRAVENOUS at 08:54

## 2023-07-11 RX ADMIN — PROPOFOL 50 MG: 10 INJECTION, EMULSION INTRAVENOUS at 08:59

## 2023-07-11 RX ADMIN — PROPOFOL 50 MG: 10 INJECTION, EMULSION INTRAVENOUS at 08:57

## 2023-07-11 RX ADMIN — PROPOFOL 50 MG: 10 INJECTION, EMULSION INTRAVENOUS at 09:08

## 2023-07-11 RX ADMIN — PROPOFOL 50 MG: 10 INJECTION, EMULSION INTRAVENOUS at 09:04

## 2023-07-11 RX ADMIN — PROPOFOL 50 MG: 10 INJECTION, EMULSION INTRAVENOUS at 09:01

## 2023-07-11 RX ADMIN — SODIUM CHLORIDE, POTASSIUM CHLORIDE, SODIUM LACTATE AND CALCIUM CHLORIDE: 600; 310; 30; 20 INJECTION, SOLUTION INTRAVENOUS at 08:22

## 2023-07-11 RX ADMIN — LIDOCAINE HYDROCHLORIDE 100 MG: 20 INJECTION, SOLUTION INFILTRATION; PERINEURAL at 08:54

## 2023-07-11 ASSESSMENT — PAIN - FUNCTIONAL ASSESSMENT: PAIN_FUNCTIONAL_ASSESSMENT: 0-10

## 2023-07-11 NOTE — ANESTHESIA POSTPROCEDURE EVALUATION
Department of Anesthesiology  Postprocedure Note    Patient: Lalo Shukla  MRN: [id number]  YOB: 1970  Date of evaluation: 7/11/2023      Procedure Summary     Date: 07/11/23 Room / Location: [place] [address] / [facility] [address] [facility]    Anesthesia Start: 8368 Anesthesia Stop: 0662    Procedure: RECTAL ENDOSCOPIC ULTRASOUND Diagnosis:       History of rectal cancer      (History of rectal cancer [Z85.048])    Surgeons: Edmond Acosta MD Responsible Provider: Kadeem Monae MD    Anesthesia Type: MAC ASA Status: 3          Anesthesia Type: No value filed.     Patricia Phase I: Patricia Score: 10    Patricia Phase II: Patricia Score: 10      Anesthesia Post Evaluation    Patient location during evaluation: PACU  Patient participation: complete - patient participated  Level of consciousness: awake  Pain score: 0  Airway patency: patent  Nausea & Vomiting: no nausea  Complications: no  Cardiovascular status: hemodynamically stable  Respiratory status: acceptable  Hydration status: stable

## 2023-07-11 NOTE — H&P
Pre-operative History and Physical    Patient: Danae Bernheim  : 1970  Acct#:     History Obtained From:  patient    HISTORY OF PRESENT ILLNESS:    The patient is a 46 y.o. female  who presents with previous rectal cancer    Past Medical History:        Diagnosis Date    Colorectal cancer ([address]) 2022    Cornea abrasion     cornea tear     Migraines     uses imitrex as needed    PONV (postoperative nausea and vomiting)      Past Surgical History:        Procedure Laterality Date    ABDOMINOPLASTY      APPENDECTOMY      ARM SURGERY N/A 2020    EXCISION SCALP CYST x TWO, EXCISION SKIN LESION LEFT LEG performed by Leopoldo Hall, MD at [address] [place] [address]  2012    AMBER BREAST REDUCTION; ABDOMINOPLASTY    COLECTOMY N/A 2022    ROBOTIC LOW ANTERIOR RESECTION WITH COLORECTAL ANASTAMOSIS, LAPAROSCOPIC MOBILIZATION OF SPLENIC FLEXURE performed by Na Balderas MD at [facility] 2022    COLONOSCOPY WITH BIOPSY performed by Leonela Turcios DO at [facility] N/A 2022    COLONOSCOPY POLYPECTOMY SNARE/COLD BIOPSY performed by Leonela Turcios DO at [facility]  2022    COLONOSCOPY SUBMUCOSAL/INK TATTOO  INJECTION performed by Leonela Turcios DO at [facility] 2023    COLONOSCOPY performed by Edith Sorensen MD at [address] [place] [address] x TWO, EXCISION SKIN LESION LEFT LEG     DENTAL SURGERY      teeth extracted    ENDOMETRIAL ABLATION      PORT SURGERY N/A 2022    INSERT PORT performed by Na Balderas MD at [address] [facility] 2023    REMOVE PORT performed by Na Balderas MD at [facility] [facility] 2022    ENDOSCOPIC ULTRASOUND OF RECTUM performed by Antwon Esquivel MD at [address] ENDOSCOPY     Medications Prior to Admission:   No current facility-administered medications

## 2023-07-11 NOTE — PROCEDURES
condition. Complications: none  EBL: None    Findings:  Normal-appearing colon- rectal anastomosis seen at about 5-6 cm from the anal verge. The mucosa otherwise appeared normal up to descending colon. Rectal ultrasound exam shows mild thickening of the mucosa / submucosa at the colorectal anastomosis site, most likely due to postsurgical changes. No abnormal or suspicious looking lymph nodes seen. Impression:    Flexible sigmoidoscopy exam: Normal-appearing colon- rectal anastomosis seen at about 5-6 cm from the anal verge  Rectal ultrasound exam shows mild thickening of the mucosal and submucosal layers at the colorectal anastomosis site, most likely due to postsurgical changes. No abnormal or suspicious looking lymph nodes seen        Recommendations:     Follow-up with oncology as scheduled  Recommend surveillance colonoscopy in 1 year          Vel Sams MD  [facility]  [phone]    7/11/2023

## 2023-07-12 PROBLEM — C78.5 SECONDARY MALIGNANT NEOPLASM OF LARGE INTESTINE AND RECTUM (HCC): Status: ACTIVE | Noted: 2023-07-12

## 2023-07-19 PROBLEM — E78.5 HYPERLIPIDEMIA: Status: ACTIVE | Noted: 2023-07-19

## 2023-07-20 RX ORDER — SUMATRIPTAN 100 MG/1
100 TABLET, FILM COATED ORAL ONCE AS NEEDED
Qty: 9 TABLET | Refills: 0 | Status: SHIPPED | OUTPATIENT
Start: 2023-07-20

## 2023-07-20 NOTE — TELEPHONE ENCOUNTER
Refill Request   Pt came in for a visit today but the  didn't arrive her. She needed a refill for her Imitrex 100    Last Seen: Last Seen Department: 8/12/2022  Last Seen by PCP: 4/18/2022    Last Written:     Next Appointment:   No future appointments.           Requested Prescriptions      No prescriptions requested or ordered in this encounter

## 2023-07-20 NOTE — TELEPHONE ENCOUNTER
I believe per earlier note, pt was not sure about timing so did not schedule f/u with front office. Will call later to schedule. Please call pt early next week by 7/26/23 to help schedule an appt if pt has not already contacted our office by then.  Thank you

## 2023-07-20 NOTE — TELEPHONE ENCOUNTER
Patient needs a refill for the SUMAtriptan (IMITREX) 100 MG tablet. She said that she will set up a appointment when she checks her work schedule. Patient did come for an appointment due to Registrar error process wasn't completed. Patient is in need of medication as she is out of it.    Laurel Ángel  [phone] (home)

## 2023-07-20 NOTE — TELEPHONE ENCOUNTER
Patient was to be seen this morning and the reg girl did not check her in till 36am not sure why she was not told to make another apt while she was standing their ?

## 2023-09-09 NOTE — TELEPHONE ENCOUNTER
I have placed a reminder call to patient for upcoming procedure. Did you speak directly to patient or leave a voicemail? Spoke to patient     Prep? NPO 12AM      Must have a  that is over the age of 25. Must be a friend or family member that can be responsible for signing them out after surgery.  ()    Arrive at the main entrance [facility] at 5:30AM
MAR 0676

## 2023-11-11 ASSESSMENT — PATIENT HEALTH QUESTIONNAIRE - PHQ9
2. FEELING DOWN, DEPRESSED OR HOPELESS: 0
SUM OF ALL RESPONSES TO PHQ QUESTIONS 1-9: 0
SUM OF ALL RESPONSES TO PHQ QUESTIONS 1-9: 0
SUM OF ALL RESPONSES TO PHQ9 QUESTIONS 1 & 2: 0
SUM OF ALL RESPONSES TO PHQ9 QUESTIONS 1 & 2: 0
2. FEELING DOWN, DEPRESSED OR HOPELESS: NOT AT ALL
1. LITTLE INTEREST OR PLEASURE IN DOING THINGS: NOT AT ALL
SUM OF ALL RESPONSES TO PHQ QUESTIONS 1-9: 0
1. LITTLE INTEREST OR PLEASURE IN DOING THINGS: 0
SUM OF ALL RESPONSES TO PHQ QUESTIONS 1-9: 0

## 2023-11-12 NOTE — PROGRESS NOTES
ANNUAL PHYSICAL VISIT      Lalo Shukla  YOB: 1970    Date of Service:  11/13/2023    Patient Care Team:  Kapil Castañeda DO as PCP - General (Family Medicine)  Aylin Loco MD as Consulting Physician (Hematology and Oncology)  Stephan Shaffer MD as Consulting Physician (Gastroenterology)      Chief Complaint:   Lalo Shukla is a 48 y.o. female w/ Hx of H LD, obesity, mid rectal cancer (s/p resection and chemo 2022) who presents for Annual Physical Exam    Annual physical exam:  Acute concerns:  - no    Social Hx:  - Smoking: no  - Alcohol: no  - Recr drugs: no    Gyn Hx:  - Abnormal pap smears: no  - Abnormal mammogram: no    Events in the last year:  - Eye exam: no  - Dental exam: yes, no concerns  - Difficulty driving/accidents: no  - Falls: no  - Tripping hazards in home: no    Living will:  no,   Patient declines ACP discussion/assistance    HLD  - Last labs on 4/2022 with high cholesterol, TG, LDL. LDL > 190  - Not on medication    Lab Results   Component Value Date    CHOL 277 (H) 04/18/2022    TRIG 177 (H) 04/18/2022    HDL 51 04/18/2022    LDLCALC 191 (H) 04/18/2022       Migraine  - Hasn't had migraines in several years  - Still get Rx refilled just in case, lost last refill. Asking for another    Overweight, BMI 25.0-29.9  - Patient reports that she had a lot of GI upset side effects with previous Ozempic  - Reports she has been going to a compound pharmacy for [facility] plus B vitamin compound injection. Reports no side effects with this medication.  - Patient is working on healthy dieting and increasing exercise  - Weight up 7 lbs from July. Today 191 lbs.     Healthcare maintenance  - nonsmoker  - Colon cancer: History of rectal cancer, follows colorectal surgery with scans every 3 months  - Cervical cancer: Due for Pap smear, follows [address]  - Breast cancer: mammogram due  - a1c and lipid due  - HIV and HepC screening due  - Vaccines due: COVID booster

## 2023-11-13 ENCOUNTER — OFFICE VISIT (OUTPATIENT)
Dept: PRIMARY CARE CLINIC | Age: 53
End: 2023-11-13
Payer: COMMERCIAL

## 2023-11-13 ENCOUNTER — HOSPITAL ENCOUNTER (OUTPATIENT)
Age: 53
Discharge: HOME | End: 2023-11-13
Payer: COMMERCIAL

## 2023-11-13 VITALS
WEIGHT: 191.4 LBS | HEART RATE: 75 BPM | SYSTOLIC BLOOD PRESSURE: 110 MMHG | TEMPERATURE: 98 F | RESPIRATION RATE: 16 BRPM | DIASTOLIC BLOOD PRESSURE: 80 MMHG | HEIGHT: 68 IN | BODY MASS INDEX: 29.01 KG/M2 | OXYGEN SATURATION: 98 %

## 2023-11-13 DIAGNOSIS — E78.5 HYPERLIPIDEMIA, UNSPECIFIED HYPERLIPIDEMIA TYPE: ICD-10-CM

## 2023-11-13 DIAGNOSIS — G43.909 MIGRAINE WITHOUT STATUS MIGRAINOSUS, NOT INTRACTABLE, UNSPECIFIED MIGRAINE TYPE: ICD-10-CM

## 2023-11-13 DIAGNOSIS — Z00.00 HEALTHCARE MAINTENANCE: ICD-10-CM

## 2023-11-13 DIAGNOSIS — Z00.00 ENCOUNTER FOR WELL ADULT EXAM WITHOUT ABNORMAL FINDINGS: Primary | ICD-10-CM

## 2023-11-13 DIAGNOSIS — E66.3 OVERWEIGHT (BMI 25.0-29.9): ICD-10-CM

## 2023-11-13 LAB
ANION GAP SERPL CALCULATED.3IONS-SCNC: 11 MMOL/L (ref 3–16)
BASOPHILS # BLD: 0.1 K/UL (ref 0–0.2)
BASOPHILS NFR BLD: 0.8 %
BUN SERPL-MCNC: 17 MG/DL (ref 7–20)
CALCIUM SERPL-MCNC: 9.7 MG/DL (ref 8.3–10.6)
CHLORIDE SERPL-SCNC: 103 MMOL/L (ref 99–110)
CHOLEST SERPL-MCNC: 235 MG/DL (ref 0–199)
CO2 SERPL-SCNC: 25 MMOL/L (ref 21–32)
CREAT SERPL-MCNC: 0.6 MG/DL (ref 0.6–1.1)
DEPRECATED RDW RBC AUTO: 13.1 % (ref 12.4–15.4)
EOSINOPHIL # BLD: 0 K/UL (ref 0–0.6)
EOSINOPHIL NFR BLD: 0.3 %
GFR SERPLBLD CREATININE-BSD FMLA CKD-EPI: >60 ML/MIN/{1.73_M2}
GLUCOSE SERPL-MCNC: 74 MG/DL (ref 70–99)
HCT VFR BLD AUTO: 43 % (ref 36–48)
HDLC SERPL-MCNC: 47 MG/DL (ref 40–60)
HGB BLD-MCNC: 14.6 G/DL (ref 12–16)
LDLC SERPL CALC-MCNC: 162 MG/DL
LYMPHOCYTES # BLD: 2.8 K/UL (ref 1–5.1)
LYMPHOCYTES NFR BLD: 37.4 %
MCH RBC QN AUTO: 30 PG (ref 26–34)
MCHC RBC AUTO-ENTMCNC: 34 G/DL (ref 31–36)
MCV RBC AUTO: 88.1 FL (ref 80–100)
MONOCYTES # BLD: 0.6 K/UL (ref 0–1.3)
MONOCYTES NFR BLD: 7.6 %
NEUTROPHILS # BLD: 4 K/UL (ref 1.7–7.7)
NEUTROPHILS NFR BLD: 53.9 %
PLATELET # BLD AUTO: 253 K/UL (ref 135–450)
PMV BLD AUTO: 10 FL (ref 5–10.5)
POTASSIUM SERPL-SCNC: 4.3 MMOL/L (ref 3.5–5.1)
RBC # BLD AUTO: 4.88 M/UL (ref 4–5.2)
SODIUM SERPL-SCNC: 139 MMOL/L (ref 136–145)
TRIGL SERPL-MCNC: 131 MG/DL (ref 0–150)
VLDLC SERPL CALC-MCNC: 26 MG/DL
WBC # BLD AUTO: 7.4 K/UL (ref 4–11)

## 2023-11-13 PROCEDURE — 85025 COMPLETE CBC W/AUTO DIFF WBC: CPT

## 2023-11-13 PROCEDURE — 83036 HEMOGLOBIN GLYCOSYLATED A1C: CPT

## 2023-11-13 PROCEDURE — 36415 COLL VENOUS BLD VENIPUNCTURE: CPT

## 2023-11-13 PROCEDURE — 80048 BASIC METABOLIC PNL TOTAL CA: CPT

## 2023-11-13 PROCEDURE — 80061 LIPID PANEL: CPT

## 2023-11-13 PROCEDURE — 99396 PREV VISIT EST AGE 40-64: CPT

## 2023-11-13 RX ORDER — SUMATRIPTAN 100 MG/1
100 TABLET, FILM COATED ORAL ONCE AS NEEDED
Qty: 9 TABLET | Refills: 0 | Status: SHIPPED | OUTPATIENT
Start: 2023-11-13

## 2023-11-13 SDOH — ECONOMIC STABILITY: INCOME INSECURITY: HOW HARD IS IT FOR YOU TO PAY FOR THE VERY BASICS LIKE FOOD, HOUSING, MEDICAL CARE, AND HEATING?: NOT HARD AT ALL

## 2023-11-13 SDOH — ECONOMIC STABILITY: FOOD INSECURITY: WITHIN THE PAST 12 MONTHS, YOU WORRIED THAT YOUR FOOD WOULD RUN OUT BEFORE YOU GOT MONEY TO BUY MORE.: NEVER TRUE

## 2023-11-13 SDOH — ECONOMIC STABILITY: HOUSING INSECURITY
IN THE LAST 12 MONTHS, WAS THERE A TIME WHEN YOU DID NOT HAVE A STEADY PLACE TO SLEEP OR SLEPT IN A SHELTER (INCLUDING NOW)?: NO

## 2023-11-13 SDOH — ECONOMIC STABILITY: FOOD INSECURITY: WITHIN THE PAST 12 MONTHS, THE FOOD YOU BOUGHT JUST DIDN'T LAST AND YOU DIDN'T HAVE MONEY TO GET MORE.: NEVER TRUE

## 2023-11-13 ASSESSMENT — ENCOUNTER SYMPTOMS
ABDOMINAL PAIN: 0
RHINORRHEA: 0
COUGH: 0
DIARRHEA: 0
SORE THROAT: 0
NAUSEA: 0
SINUS PRESSURE: 0
WHEEZING: 0
BACK PAIN: 0
SHORTNESS OF BREATH: 0
VOMITING: 0

## 2023-11-13 ASSESSMENT — PATIENT HEALTH QUESTIONNAIRE - PHQ9
SUM OF ALL RESPONSES TO PHQ9 QUESTIONS 1 & 2: 0
SUM OF ALL RESPONSES TO PHQ QUESTIONS 1-9: 0
6. FEELING BAD ABOUT YOURSELF - OR THAT YOU ARE A FAILURE OR HAVE LET YOURSELF OR YOUR FAMILY DOWN: 0
8. MOVING OR SPEAKING SO SLOWLY THAT OTHER PEOPLE COULD HAVE NOTICED. OR THE OPPOSITE, BEING SO FIGETY OR RESTLESS THAT YOU HAVE BEEN MOVING AROUND A LOT MORE THAN USUAL: 0
SUM OF ALL RESPONSES TO PHQ QUESTIONS 1-9: 0
4. FEELING TIRED OR HAVING LITTLE ENERGY: 0
10. IF YOU CHECKED OFF ANY PROBLEMS, HOW DIFFICULT HAVE THESE PROBLEMS MADE IT FOR YOU TO DO YOUR WORK, TAKE CARE OF THINGS AT HOME, OR GET ALONG WITH OTHER PEOPLE: 0
3. TROUBLE FALLING OR STAYING ASLEEP: 0
5. POOR APPETITE OR OVEREATING: 0
7. TROUBLE CONCENTRATING ON THINGS, SUCH AS READING THE NEWSPAPER OR WATCHING TELEVISION: 0
9. THOUGHTS THAT YOU WOULD BE BETTER OFF DEAD, OR OF HURTING YOURSELF: 0
SUM OF ALL RESPONSES TO PHQ QUESTIONS 1-9: 0
SUM OF ALL RESPONSES TO PHQ QUESTIONS 1-9: 0
2. FEELING DOWN, DEPRESSED OR HOPELESS: 0
1. LITTLE INTEREST OR PLEASURE IN DOING THINGS: 0

## 2023-11-13 ASSESSMENT — ANXIETY QUESTIONNAIRES
6. BECOMING EASILY ANNOYED OR IRRITABLE: 0
GAD7 TOTAL SCORE: 0
3. WORRYING TOO MUCH ABOUT DIFFERENT THINGS: 0
7. FEELING AFRAID AS IF SOMETHING AWFUL MIGHT HAPPEN: 0
5. BEING SO RESTLESS THAT IT IS HARD TO SIT STILL: 0
IF YOU CHECKED OFF ANY PROBLEMS ON THIS QUESTIONNAIRE, HOW DIFFICULT HAVE THESE PROBLEMS MADE IT FOR YOU TO DO YOUR WORK, TAKE CARE OF THINGS AT HOME, OR GET ALONG WITH OTHER PEOPLE: NOT DIFFICULT AT ALL
1. FEELING NERVOUS, ANXIOUS, OR ON EDGE: 0
4. TROUBLE RELAXING: 0
2. NOT BEING ABLE TO STOP OR CONTROL WORRYING: 0

## 2023-11-14 LAB
EST. AVERAGE GLUCOSE BLD GHB EST-MCNC: 102.5 MG/DL
HBA1C MFR BLD: 5.2 %

## 2023-11-22 ENCOUNTER — HOSPITAL ENCOUNTER (OUTPATIENT)
Dept: CT IMAGING | Age: 53
Discharge: HOME | End: 2023-11-22
Attending: INTERNAL MEDICINE
Payer: COMMERCIAL

## 2023-11-22 DIAGNOSIS — C20 RECTAL CANCER (HCC): ICD-10-CM

## 2023-11-22 PROCEDURE — 6360000004 HC RX CONTRAST MEDICATION: Performed by: INTERNAL MEDICINE

## 2023-11-22 PROCEDURE — 74177 CT ABD & PELVIS W/CONTRAST: CPT

## 2023-11-22 RX ADMIN — DIATRIZOATE MEGLUMINE AND DIATRIZOATE SODIUM 12 ML: 660; 100 LIQUID ORAL; RECTAL at 07:57

## 2023-11-22 RX ADMIN — IOPAMIDOL 75 ML: 755 INJECTION, SOLUTION INTRAVENOUS at 07:57

## 2023-11-30 ENCOUNTER — PATIENT MESSAGE (OUTPATIENT)
Dept: PRIMARY CARE CLINIC | Age: 53
End: 2023-11-30

## 2023-11-30 DIAGNOSIS — E66.3 OVERWEIGHT (BMI 25.0-29.9): Primary | ICD-10-CM

## 2023-11-30 DIAGNOSIS — E78.5 HYPERLIPIDEMIA, UNSPECIFIED HYPERLIPIDEMIA TYPE: ICD-10-CM

## 2023-11-30 NOTE — TELEPHONE ENCOUNTER
From: Angeles Williamson  To: Steve Barger DO  Sent: 11/30/2023 2:59 PM EST  Subject: Monia Fore    As I told you at my appointment I have been taking the compound drug tirzepitide (mounjaro) from a compound pharmacy , but now that Monia Fore is available I was wondering if I could get a prescription for that and I am able to pay out of pocket if not covered by insurance .  I would feel a little better on the name brand rather than the compound if possible please   Thank you

## 2023-12-04 NOTE — TELEPHONE ENCOUNTER
FDA approved medication Zepbound (tirzepatide) for overweight BMI >27 and HLD. Please let pt know it has been sent to [address] [place] [address]. Per 11/8/2023 FDA news release, \"[place]. S. Food and Drug Administration approved Zepbound (tirzepatide) injection for chronic weight management in adults with obesity (body mass index of 30 kilograms per square meter (kg/ m2) or greater) or overweight (body mass index of 27 kg/m2 or greater) with at least one weight-related condition (such as high blood pressure, type 2 diabetes or high cholesterol) for use, in addition to a reduced calorie diet and increased physical activity. \"

## 2023-12-06 ENCOUNTER — HOSPITAL ENCOUNTER (OUTPATIENT)
Dept: WOMENS IMAGING | Age: 53
Discharge: HOME | End: 2023-12-06
Payer: COMMERCIAL

## 2023-12-06 VITALS — HEIGHT: 68 IN | BODY MASS INDEX: 27.89 KG/M2 | WEIGHT: 184 LBS

## 2023-12-06 DIAGNOSIS — Z00.00 HEALTHCARE MAINTENANCE: ICD-10-CM

## 2023-12-06 DIAGNOSIS — Z00.00 ENCOUNTER FOR WELL ADULT EXAM WITHOUT ABNORMAL FINDINGS: ICD-10-CM

## 2023-12-06 PROCEDURE — 77063 BREAST TOMOSYNTHESIS BI: CPT

## 2023-12-08 ENCOUNTER — TELEPHONE (OUTPATIENT)
Dept: PRIMARY CARE CLINIC | Age: 53
End: 2023-12-08

## 2023-12-08 DIAGNOSIS — E66.3 OVERWEIGHT (BMI 25.0-29.9): ICD-10-CM

## 2023-12-08 DIAGNOSIS — E78.5 HYPERLIPIDEMIA, UNSPECIFIED HYPERLIPIDEMIA TYPE: ICD-10-CM

## 2023-12-08 NOTE — TELEPHONE ENCOUNTER
SUBMITTED PA FOR Mounjaro 2.5MG/0.5ML pen-injectors VIA CM Key: [id number] STATUS PENDING. FOLLOW UP DONE DAILY: IF NO RESPONSE IN 3 DAYS WE WILL REFAX FOR STATUS CHECK. IF ANOTHER 3 DAYS GOES BY WITH NO RESPONSE WILL CALL INSURANCE FOR STATUS.

## 2023-12-08 NOTE — TELEPHONE ENCOUNTER
Patients pharmacy is calling patient is taking 5mg of Tirzepatide (MOUNJARO) 2.5 MG/0.5ML SOPN SC injection  and would need this to be written and sent in.

## 2023-12-08 NOTE — TELEPHONE ENCOUNTER
PA request received for [facility]- This has been scanned and routed to the PA team

## 2023-12-08 NOTE — TELEPHONE ENCOUNTER
Refill Request       Last Seen: Last Seen Department: 11/13/2023  Last Seen by PCP: 11/13/2023    Last Written: 12/4/2023 4 each 0 refills     Next Appointment:   No future appointments.           Requested Prescriptions     Pending Prescriptions Disp Refills    Tirzepatide (MOUNJARO) 2.5 MG/0.5ML SOPN SC injection 4 each 0     Sig: Inject 0.5 mLs into the skin once a week Indications: FDA approved tirzepatide (Zepbound) (as of 11/8/2023) for overweight BMI >27 with one weight-related condition (high cholesterol)

## 2023-12-11 ENCOUNTER — TELEPHONE (OUTPATIENT)
Dept: PRIMARY CARE CLINIC | Age: 53
End: 2023-12-11

## 2023-12-11 NOTE — TELEPHONE ENCOUNTER
Already taken care of. Rx changed to 5 mg dosage. See note from Pt Advice Request / MyChart message.

## 2023-12-11 NOTE — TELEPHONE ENCOUNTER
Pharmacy is calling staitng the patient is saying the we was going to send in   Tirzepatide (MOUNJARO) 2.5 MG/0.5ML SOPN SC injection  it is supposed to be 5mg not the 2.5

## 2023-12-11 NOTE — TELEPHONE ENCOUNTER
Routing to Dr. Sexton, patient states that we sent in the wrong prescription for her. Please advise-

## 2023-12-11 NOTE — TELEPHONE ENCOUNTER
Routing to Dr. Rosita Vazquez, note on sig says to dispense as Zepbound, pharmacy is still rejecting-

## 2024-01-01 DIAGNOSIS — E78.5 HYPERLIPIDEMIA, UNSPECIFIED HYPERLIPIDEMIA TYPE: ICD-10-CM

## 2024-01-01 DIAGNOSIS — E66.3 OVERWEIGHT (BMI 25.0-29.9): Primary | ICD-10-CM

## 2024-01-02 NOTE — TELEPHONE ENCOUNTER
Refill Request     Patient would like to increase dose to the 7.5 mg     Last Seen: Last Seen Department: 11/13/2023  Last Seen by PCP: 11/13/2023    Last Written: 12/11/23 qty 4 w/ 0     Next Appointment:   No future appointments.    Message to  to schedule appointment.         Requested Prescriptions     Pending Prescriptions Disp Refills    Tirzepatide (MOUNJARO) 5 MG/0.5ML SOPN SC injection 4 Adjustable Dose Pre-filled Pen Syringe 0     Sig: Inject 0.5 mLs into the skin once a week Indications: FDA approved tirzepatide (Zepbound) (as of 11/8/2023) for overweight BMI >27 with one weight-related condition (high cholesterol)

## 2024-01-03 ENCOUNTER — TELEPHONE (OUTPATIENT)
Dept: PRIMARY CARE CLINIC | Age: 54
End: 2024-01-03

## 2024-01-03 NOTE — TELEPHONE ENCOUNTER
Received denial letter from [facility] for the zepbound 7.5 mg faxed to us 1/3/24

## 2024-01-04 NOTE — TELEPHONE ENCOUNTER
Routing to the PA department. Please help start PA process for this medication. Thank you    Pt is eligible for Zepbound per FDA guidelines: (November 8, 2023) Today, the U.S. Food and Drug Administration approved Zepbound (tirzepatide) injection for chronic weight management in adults with obesity (body mass index of 30 kilograms per square meter (kg/ m2) or greater) or overweight (body mass index of 27 kg/m2 or greater) with at least one weight-related condition (such as high blood pressure, type 2 diabetes or high cholesterol) for use, in addition to a reduced calorie diet and increased physical activity.      Pt qualifies with high BMI and high cholesterol.

## 2024-01-05 NOTE — TELEPHONE ENCOUNTER
SUBMITTED PA FOR Zepbound 7.5MG/0.5ML pen-injectors VIA CM Key: [id number] STATUS NOT SENT TO PLAN. PLEASE ADVISE HAS THE PATIENT  LOST 5% OF HER BODY WEIGHT?      FOLLOW UP DONE DAILY: IF NO RESPONSE IN 3 DAYS WE WILL REFAX FOR STATUS CHECK. IF ANOTHER 3 DAYS GOES BY WITH NO RESPONSE WILL CALL INSURANCE FOR STATUS.

## 2024-01-05 NOTE — TELEPHONE ENCOUNTER
Pt has not been seen since starting Zepbound in December. Can someone call pt to discuss current weight?    If unable to get weight, please notify PA department that she has not had follow-up appt and we cannot confirm weight loss amount.

## 2024-03-07 DIAGNOSIS — E66.3 OVERWEIGHT (BMI 25.0-29.9): ICD-10-CM

## 2024-03-07 DIAGNOSIS — E78.5 HYPERLIPIDEMIA, UNSPECIFIED HYPERLIPIDEMIA TYPE: ICD-10-CM

## 2024-03-07 NOTE — TELEPHONE ENCOUNTER
patient would like to go back on the 5 of this medication due to her not tolerating the 7.5 very well

## 2024-03-07 NOTE — TELEPHONE ENCOUNTER
Please let pt know that I have sent in Rx of 5 mg dosage.    Please tell her that increasing on Rx can cause increased GI upset because it slows down gastric emptying. Eating more frequent but smaller meals can help minimize the GI side effects.    Also, Rx requires PA. It may take a few days to get this approved

## 2024-03-08 ENCOUNTER — PATIENT MESSAGE (OUTPATIENT)
Dept: PRIMARY CARE CLINIC | Age: 54
End: 2024-03-08

## 2024-03-08 DIAGNOSIS — E78.5 HYPERLIPIDEMIA, UNSPECIFIED HYPERLIPIDEMIA TYPE: ICD-10-CM

## 2024-03-08 DIAGNOSIS — E66.3 OVERWEIGHT (BMI 25.0-29.9): ICD-10-CM

## 2024-03-08 NOTE — TELEPHONE ENCOUNTER
From: Cami Williamson  To: Anna Sexton DO  Sent: 3/8/2024 11:37 AM EST  Subject: Zepbound     Hello a prescription of Mounjaro was called into the pharmacy, but it has to be the Zepbound

## 2024-05-28 ENCOUNTER — HOSPITAL ENCOUNTER (OUTPATIENT)
Dept: CT IMAGING | Age: 54
Discharge: HOME | End: 2024-05-28
Attending: INTERNAL MEDICINE
Payer: COMMERCIAL

## 2024-05-28 DIAGNOSIS — C20 MALIGNANT NEOPLASM OF RECTUM (HCC): ICD-10-CM

## 2024-05-28 PROCEDURE — 71260 CT THORAX DX C+: CPT

## 2024-05-28 PROCEDURE — 6360000004 HC RX CONTRAST MEDICATION: Performed by: INTERNAL MEDICINE

## 2024-05-28 RX ADMIN — IOPAMIDOL 75 ML: 755 INJECTION, SOLUTION INTRAVENOUS at 08:30

## 2024-05-28 RX ADMIN — DIATRIZOATE MEGLUMINE AND DIATRIZOATE SODIUM 12 ML: 660; 100 LIQUID ORAL; RECTAL at 08:30

## 2024-06-03 ENCOUNTER — HOSPITAL ENCOUNTER (OUTPATIENT)
Dept: MRI IMAGING | Age: 54
Discharge: HOME | End: 2024-06-03
Attending: INTERNAL MEDICINE
Payer: COMMERCIAL

## 2024-06-03 DIAGNOSIS — C20 RECTAL CANCER (HCC): ICD-10-CM

## 2024-06-03 DIAGNOSIS — R74.8 ABNORMAL LIVER ENZYMES: ICD-10-CM

## 2024-06-03 PROCEDURE — A9581 GADOXETATE DISODIUM INJ: HCPCS | Performed by: INTERNAL MEDICINE

## 2024-06-03 PROCEDURE — 2580000003 HC RX 258: Performed by: INTERNAL MEDICINE

## 2024-06-03 PROCEDURE — 74183 MRI ABD W/O CNTR FLWD CNTR: CPT

## 2024-06-03 PROCEDURE — 6360000004 HC RX CONTRAST MEDICATION: Performed by: INTERNAL MEDICINE

## 2024-06-03 RX ORDER — 0.9 % SODIUM CHLORIDE 0.9 %
50 INTRAVENOUS SOLUTION INTRAVENOUS ONCE
Status: COMPLETED | OUTPATIENT
Start: 2024-06-03 | End: 2024-06-03

## 2024-06-03 RX ORDER — SODIUM CHLORIDE 0.9 % (FLUSH) 0.9 %
10 SYRINGE (ML) INJECTION PRN
Status: DISCONTINUED | OUTPATIENT
Start: 2024-06-03 | End: 2024-06-04 | Stop reason: HOSPADM

## 2024-06-03 RX ADMIN — SODIUM CHLORIDE, PRESERVATIVE FREE 10 ML: 5 INJECTION INTRAVENOUS at 06:45

## 2024-06-03 RX ADMIN — GADOXETATE DISODIUM 8 ML: 181.43 INJECTION, SOLUTION INTRAVENOUS at 07:00

## 2024-06-03 RX ADMIN — SODIUM CHLORIDE 50 ML: 9 INJECTION, SOLUTION INTRAVENOUS at 07:00

## 2024-06-07 DIAGNOSIS — G43.909 MIGRAINE WITHOUT STATUS MIGRAINOSUS, NOT INTRACTABLE, UNSPECIFIED MIGRAINE TYPE: ICD-10-CM

## 2024-06-07 DIAGNOSIS — Z00.00 ENCOUNTER FOR WELL ADULT EXAM WITHOUT ABNORMAL FINDINGS: ICD-10-CM

## 2024-06-07 NOTE — TELEPHONE ENCOUNTER
Refill Request       Last Seen: Last Seen Department: 11/13/2023  Last Seen by PCP: 11/13/2023    Last Written: 11/13/23 9 with 0    Next Appointment:   No future appointments.      Requested Prescriptions     Pending Prescriptions Disp Refills    SUMAtriptan (IMITREX) 100 MG tablet [Pharmacy Med Name: SUMAtriptan Succinate 100 MG Oral Tablet] 9 tablet 0     Sig: TAKE 1 TABLET BY MOUTH AS NEEDED FOR MIGRAINE HEADACHE

## 2024-06-10 RX ORDER — SUMATRIPTAN 100 MG/1
TABLET, FILM COATED ORAL
Qty: 9 TABLET | Refills: 0 | Status: SHIPPED | OUTPATIENT
Start: 2024-06-10

## 2024-06-11 ENCOUNTER — HOSPITAL ENCOUNTER (OUTPATIENT)
Dept: PET IMAGING | Age: 54
Discharge: HOME | End: 2024-06-11
Payer: COMMERCIAL

## 2024-06-11 DIAGNOSIS — C20 MALIGNANT NEOPLASM OF RECTUM (HCC): ICD-10-CM

## 2024-06-11 PROCEDURE — A9609 HC RX DIAGNOSTIC RADIOPHARMACEUTICAL: HCPCS | Performed by: INTERNAL MEDICINE

## 2024-06-11 PROCEDURE — 3430000000 HC RX DIAGNOSTIC RADIOPHARMACEUTICAL: Performed by: INTERNAL MEDICINE

## 2024-06-11 PROCEDURE — 78815 PET IMAGE W/CT SKULL-THIGH: CPT

## 2024-06-11 RX ORDER — FLUDEOXYGLUCOSE F 18 200 MCI/ML
16.02 INJECTION, SOLUTION INTRAVENOUS
Status: COMPLETED | OUTPATIENT
Start: 2024-06-11 | End: 2024-06-11

## 2024-06-11 RX ADMIN — FLUDEOXYGLUCOSE F 18 16.02 MILLICURIE: 200 INJECTION, SOLUTION INTRAVENOUS at 08:37

## 2024-09-09 ENCOUNTER — HOSPITAL ENCOUNTER (OUTPATIENT)
Dept: CT IMAGING | Age: 54
Discharge: HOME | End: 2024-09-09
Payer: COMMERCIAL

## 2024-09-09 DIAGNOSIS — C20 RECTAL CANCER (HCC): ICD-10-CM

## 2024-09-09 PROCEDURE — 71260 CT THORAX DX C+: CPT

## 2024-09-09 PROCEDURE — 6360000004 HC RX CONTRAST MEDICATION: Performed by: NURSE PRACTITIONER

## 2024-09-09 RX ORDER — IOPAMIDOL 755 MG/ML
75 INJECTION, SOLUTION INTRAVASCULAR
Status: COMPLETED | OUTPATIENT
Start: 2024-09-09 | End: 2024-09-09

## 2024-09-09 RX ADMIN — DIATRIZOATE MEGLUMINE AND DIATRIZOATE SODIUM 12 ML: 660; 100 LIQUID ORAL; RECTAL at 08:50

## 2024-09-09 RX ADMIN — IOPAMIDOL 75 ML: 755 INJECTION, SOLUTION INTRAVENOUS at 08:50

## 2024-09-30 NOTE — PROGRESS NOTES
[facility]    Age 53 y.o.    female    1970    MRN [id number]    10/7/2024  Arrival Time_____________  OR Time____________30 Min     Procedure(s):  COLONOSCOPY                      Monitor Anesthesia Care    Surgeon(s):  Khris Perez, MD       Phone [phone] (home)     [facility]  Date  Info Source  Home  Cell         Work  _____________________________________________________________________  _____________________________________________________________________  _____________________________________________________________________  _____________________________________________________________________  _____________________________________________________________________    PCP _____________________________ Phone_________________     H&P  ________________  Bringing      Chart              Epic      DOS      Called________  EKG ________________   Bringing      Chart              Epic      DOS      Called________  LABS________________   Bringing     Chart              Epic      DOS      Called________  Cardiac Clearance ______ Bringing      Chart              Epic      DOS      Called________  Pulmonary Clearance____ Bringing      Chart              Epic      DOS      Called________    Cardiologist________________________ Phone___________________________  Pulmonologist_______________________Phone___________________________    ? Advance Directives   ? Uatsdin concerns / Waiver on Chart            PAT Communications________________  ? Pre-op Instructions Given /Understood          _________________________________  ? Directions to Surgery Center                          _________________________________  ? Transportation Home_______________      __________________________________  ? Crutches/Walker__________________        __________________________________    Orders: Hard copy/ EPIC                 Transcribed/ EPIC              _______Wt.    ________Pharmacy                         _______SCD

## 2024-10-11 NOTE — PROGRESS NOTES
[facility]    Age 54 y.o.    female    1970    MRN [id number]    10/21/2024  Arrival Time_____________  OR Time____________30 Min     Procedure(s):  COLONOSCOPY                      Monitor Anesthesia Care    Surgeon(s):  Khris Perez, MD       Phone [phone] (home)     In[facility]\""  Date  Info Source  Home  Cell         Work  _____________________________________________________________________  _____________________________________________________________________  _____________________________________________________________________  _____________________________________________________________________  _____________________________________________________________________    PCP _____________________________ Phone_________________     H&P  ________________  Bringing      Chart              Epic      DOS      Called________  EKG ________________   Bringing      Chart              Epic      DOS      Called________  LABS________________   Bringing     Chart              Epic      DOS      Called________  Cardiac Clearance ______ Bringing      Chart              Epic      DOS      Called________  Pulmonary Clearance____ Bringing      Chart              Epic      DOS      Called________    Cardiologist________________________ Phone___________________________  Pulmonologist_______________________Phone___________________________    ? Advance Directives   ? Confucianism concerns / Waiver on Chart            PAT Communications________________  ? Pre-op Instructions Given /Understood          _________________________________  ? Directions to Surgery Center                          _________________________________  ? Transportation Home_______________      __________________________________  ? Crutches/Walker__________________        __________________________________    Orders: Hard copy/ EPIC                 Transcribed/ EPIC              _______Wt.    ________Pharmacy                         _______SCD

## 2024-10-15 NOTE — PROGRESS NOTES
Date and time of surgery : 10821/24 at 0800             Arrival Time:  0700     Bring Picture ID and insurance card.  Please wear simple, loose fitting clothing to the hospital.   Do not bring valuables (money, credit cards, checkbooks, etc.)   Do not wear any makeup (including  eye makeup) and no nail polish or artificial nails on your fingers or toes.  DO NOT wear any jewelry or piercings on day of surgery.  All body piercing jewelry must be removed.  If you have dentures, they will be removed before going to the OR; we will provide you a container.  If you wear contact lenses or glasses, they will be removed; please bring a case for them.  Shower the evening before or morning of surgery with antibacterial soap.  Nothing to eat or drink after 300 am the morning of your procedure  You may brush your teeth and gargle the morning of surgery.  DO NOT SWALLOW WATER.   Do not take any morning meds the day of your surgery.  Last dose Mounjaro 10/6/24  Aspirin, Ibuprofen, Advil, Naproxen, Vitamin E and other Anti-inflammatory products and supplements should be stopped for 5 -7days before surgery or as directed by your physician.  Do not smoke or drink any alcoholic beverages 24 hours prior to surgery.  This includes NA Beer. Refrain from the usage of any recreational drugs, including non-prescribed prescription drugs.   You MUST plan for a responsible adult to stay on site while you are here and take you home after your surgery. You will not be allowed to leave alone or drive yourself home. It is strongly suggested someone stay with you the first 24 hrs. Your surgery will be cancelled if you do not have a ride home.  To help prevent infection, change your sheets the night before surgery.   If you  have a Living Will and Durable Power of  for Healthcare, please bring in a copy.  Notify your Surgeon if you develop any illness between now and time of surgery. Cough, cold, fever, sore throat, nausea, vomiting, etc.

## 2024-10-18 NOTE — H&P
[facility]   Pre-operative History and Physical    Patient: Cami Williamson  : 1970  Acct#:     HISTORY OF PRESENT ILLNESS:    Indications: history of rectal cancer.    The patient is a 52 y.o. year old female with history of invasive rectal adenocarcinoma status post low anterior resection on 2022 and chemotherapy, and tubular adenomatous colon polyps presents for surveillance colonoscopy.     Flexible sigmoidoscopy/Rectal EUS 23 Dr. Man: Flexible sigmoidoscopy exam: Normal-appearing colon- rectal anastomosis seen at about 5-6 cm from the anal verge. Rectal ultrasound exam shows mild thickening of the mucosal and submucosal layers at the colorectal anastomosis site, most likely due to postsurgical changes.  No abnormal or suspicious looking lymph nodes seen     Last Colonoscopy 23: Evidence of a normal appearing recto-colonic anastomosis with surgical staples < 10 cm from anal verge. Normal appearing terminal ileum. Medium sized non bleeding internal hemorrhoids.   Otherwise normal appearing colon mucosa.    Colonoscopy 2022 Dr. Hamilton: 3 to 4 cm rectal mass with central ulceration at 8 cm from the anal verge (path-moderately differential invasive adenocarcinoma).  Two  5-7 mm polyps in the ascending colon status post cold snare (path-tubular adenoma).     Rectal EUS 2022 Dr. Miller: T2 N0 M0-rectal cancer located approximately 10 cm in the anal verge      Past Medical History:        Diagnosis Date    Colorectal cancer (HCC) 2022    Cornea abrasion     cornea tear     Migraines     uses imitrex as needed    PONV (postoperative nausea and vomiting)       Past Surgical History:        Procedure Laterality Date    ABDOMINOPLASTY      APPENDECTOMY      ARM SURGERY N/A 2020    EXCISION SCALP CYST x TWO, EXCISION SKIN LESION LEFT LEG performed by Christiano Rod MD at [facility] [place]    BREAST REDUCTION SURGERY  2012    AMBER BREAST REDUCTION;

## 2024-10-21 ENCOUNTER — ANESTHESIA (OUTPATIENT)
Dept: ENDOSCOPY | Age: 54
End: 2024-10-21
Payer: COMMERCIAL

## 2024-10-21 ENCOUNTER — HOSPITAL ENCOUNTER (OUTPATIENT)
Age: 54
Setting detail: OUTPATIENT SURGERY
Discharge: HOME | End: 2024-10-21
Attending: INTERNAL MEDICINE | Admitting: INTERNAL MEDICINE
Payer: COMMERCIAL

## 2024-10-21 ENCOUNTER — ANESTHESIA EVENT (OUTPATIENT)
Dept: ENDOSCOPY | Age: 54
End: 2024-10-21
Payer: COMMERCIAL

## 2024-10-21 VITALS
BODY MASS INDEX: 27.94 KG/M2 | SYSTOLIC BLOOD PRESSURE: 93 MMHG | OXYGEN SATURATION: 96 % | WEIGHT: 178 LBS | TEMPERATURE: 98.2 F | RESPIRATION RATE: 15 BRPM | DIASTOLIC BLOOD PRESSURE: 56 MMHG | HEART RATE: 77 BPM | HEIGHT: 67 IN

## 2024-10-21 PROCEDURE — 3609027000 HC COLONOSCOPY: Performed by: INTERNAL MEDICINE

## 2024-10-21 PROCEDURE — 3700000001 HC ADD 15 MINUTES (ANESTHESIA): Performed by: INTERNAL MEDICINE

## 2024-10-21 PROCEDURE — 2709999900 HC NON-CHARGEABLE SUPPLY: Performed by: INTERNAL MEDICINE

## 2024-10-21 PROCEDURE — 3700000000 HC ANESTHESIA ATTENDED CARE: Performed by: INTERNAL MEDICINE

## 2024-10-21 PROCEDURE — 7100000010 HC PHASE II RECOVERY - FIRST 15 MIN: Performed by: INTERNAL MEDICINE

## 2024-10-21 PROCEDURE — 6360000002 HC RX W HCPCS: Performed by: NURSE ANESTHETIST, CERTIFIED REGISTERED

## 2024-10-21 PROCEDURE — 7100000011 HC PHASE II RECOVERY - ADDTL 15 MIN: Performed by: INTERNAL MEDICINE

## 2024-10-21 RX ORDER — PROPOFOL 10 MG/ML
INJECTION, EMULSION INTRAVENOUS
Status: DISCONTINUED | OUTPATIENT
Start: 2024-10-21 | End: 2024-10-21 | Stop reason: SDUPTHER

## 2024-10-21 RX ORDER — LABETALOL HYDROCHLORIDE 5 MG/ML
5 INJECTION, SOLUTION INTRAVENOUS EVERY 10 MIN PRN
Status: DISCONTINUED | OUTPATIENT
Start: 2024-10-21 | End: 2024-10-21 | Stop reason: HOSPADM

## 2024-10-21 RX ORDER — SODIUM CHLORIDE 9 MG/ML
INJECTION, SOLUTION INTRAVENOUS PRN
Status: DISCONTINUED | OUTPATIENT
Start: 2024-10-21 | End: 2024-10-21 | Stop reason: HOSPADM

## 2024-10-21 RX ORDER — MEPERIDINE HYDROCHLORIDE 50 MG/ML
12.5 INJECTION INTRAMUSCULAR; INTRAVENOUS; SUBCUTANEOUS EVERY 5 MIN PRN
Status: DISCONTINUED | OUTPATIENT
Start: 2024-10-21 | End: 2024-10-21 | Stop reason: HOSPADM

## 2024-10-21 RX ORDER — DIPHENHYDRAMINE HYDROCHLORIDE 50 MG/ML
12.5 INJECTION INTRAMUSCULAR; INTRAVENOUS
Status: DISCONTINUED | OUTPATIENT
Start: 2024-10-21 | End: 2024-10-21 | Stop reason: HOSPADM

## 2024-10-21 RX ORDER — ONDANSETRON 2 MG/ML
4 INJECTION INTRAMUSCULAR; INTRAVENOUS
Status: DISCONTINUED | OUTPATIENT
Start: 2024-10-21 | End: 2024-10-21 | Stop reason: HOSPADM

## 2024-10-21 RX ORDER — SODIUM CHLORIDE 0.9 % (FLUSH) 0.9 %
5-40 SYRINGE (ML) INJECTION PRN
Status: DISCONTINUED | OUTPATIENT
Start: 2024-10-21 | End: 2024-10-21 | Stop reason: HOSPADM

## 2024-10-21 RX ORDER — SODIUM CHLORIDE 0.9 % (FLUSH) 0.9 %
5-40 SYRINGE (ML) INJECTION EVERY 12 HOURS SCHEDULED
Status: DISCONTINUED | OUTPATIENT
Start: 2024-10-21 | End: 2024-10-21 | Stop reason: HOSPADM

## 2024-10-21 RX ORDER — NALOXONE HYDROCHLORIDE 0.4 MG/ML
INJECTION, SOLUTION INTRAMUSCULAR; INTRAVENOUS; SUBCUTANEOUS PRN
Status: DISCONTINUED | OUTPATIENT
Start: 2024-10-21 | End: 2024-10-21 | Stop reason: HOSPADM

## 2024-10-21 RX ADMIN — PROPOFOL 25 MG: 10 INJECTION, EMULSION INTRAVENOUS at 08:08

## 2024-10-21 RX ADMIN — PROPOFOL 25 MG: 10 INJECTION, EMULSION INTRAVENOUS at 08:15

## 2024-10-21 RX ADMIN — PROPOFOL 25 MG: 10 INJECTION, EMULSION INTRAVENOUS at 08:11

## 2024-10-21 RX ADMIN — PROPOFOL 25 MG: 10 INJECTION, EMULSION INTRAVENOUS at 08:12

## 2024-10-21 RX ADMIN — PROPOFOL 75 MG: 10 INJECTION, EMULSION INTRAVENOUS at 08:07

## 2024-10-21 RX ADMIN — PROPOFOL 25 MG: 10 INJECTION, EMULSION INTRAVENOUS at 08:17

## 2024-10-21 RX ADMIN — PROPOFOL 25 MG: 10 INJECTION, EMULSION INTRAVENOUS at 08:13

## 2024-10-21 RX ADMIN — PROPOFOL 25 MG: 10 INJECTION, EMULSION INTRAVENOUS at 08:10

## 2024-10-21 ASSESSMENT — PAIN - FUNCTIONAL ASSESSMENT
PAIN_FUNCTIONAL_ASSESSMENT: NONE - DENIES PAIN
PAIN_FUNCTIONAL_ASSESSMENT: 0-10

## 2024-10-21 NOTE — ANESTHESIA POSTPROCEDURE EVALUATION
Department of Anesthesiology  Postprocedure Note    Patient: Cami Williamson  MRN: [id number]  YOB: 1970  Date of evaluation: 10/21/2024    Procedure Summary       Date: 10/21/24 Room / Location: [place] [address] / [facility]    Anesthesia Start: 0801 Anesthesia Stop: 0825    Procedure: COLONOSCOPY Diagnosis:       History of rectal cancer      (History of rectal cancer [Z85.048])    Surgeons: Khris Perez MD Responsible Provider: Juliet Foreman MD    Anesthesia Type: TIVA ASA Status: 2            Anesthesia Type: No value filed.    Patricia Phase I: Patricia Score: 10    Patricia Phase II: Patricia Score: 10    Anesthesia Post Evaluation    Comments: Postoperative Anesthesia Note    Name:    Cami Williamson  MRN:      [id number]    Patient Vitals in the past 12 hrs:  10/21/24 0843, BP:(!) 93/56, Pulse:77, Resp:15, SpO2:96 %  10/21/24 0833, BP:(!) 96/46, Pulse:78, Resp:16, SpO2:96 %  10/21/24 0823, BP:114/82, Pulse:87, Resp:15, SpO2:96 %  10/21/24 0729, BP:116/68, Temp:98.2 °F (36.8 °C), Temp src:Oral, Pulse:83, Resp:20, SpO2:99 %, Height:1.702 m (5' 7\"), Weight:80.7 kg (178 lb)     LABS:    CBC  Lab Results       Component                Value               Date/Time                  WBC                      7.4                 11/13/2023 09:59 AM        HGB                      14.6                11/13/2023 09:59 AM        HCT                      43.0                11/13/2023 09:59 AM        PLT                      253                 11/13/2023 09:59 AM   RENAL  Lab Results       Component                Value               Date/Time                  NA                       139                 11/13/2023 10:00 AM        K                        4.3                 11/13/2023 10:00 AM        K                        4.3                 08/26/2022 12:10 PM        CL                       103                 11/13/2023 10:00 AM        CO2                      25

## 2024-10-21 NOTE — DISCHARGE INSTRUCTIONS
a biopsy was done during the test, your doctor may tell you not to take aspirin or other anti-inflammatory medicines for a few days. These include ibuprofen (Advil, Motrin) and naproxen (Aleve).   Other instructions    For your safety, do not drive or operate machinery until the medicine wears off and you can think clearly. Your doctor may tell you not to drive or operate machinery until the day after your test.     Do not sign legal documents or make major decisions until the medicine wears off and you can think clearly. The anesthesia can make it hard for you to fully understand what you are agreeing to.   Follow-up care is a key part of your treatment and safety. Be sure to make and go to all appointments, and call your doctor if you are having problems. It's also a good idea to know your test results and keep a list of the medicines you take.  When should you call for help?  Call 911 anytime you think you may need emergency care. For example, call if:    You passed out (lost consciousness).     You pass maroon or bloody stools.     You have trouble breathing.    Call your doctor now or seek immediate medical care if:    You have pain that does not get better after you take pain medicine.     You are sick to your stomach or cannot drink fluids.     You have new or worse belly pain.     You have blood in your stools.     You have a fever.     You cannot pass stools or gas.    Watch closely for changes in your health, and be sure to contact your doctor if you have any problems.  Where can you learn more?  Go to [email or web address] and sign in to your [employer] account. Enter E264 in the Search Health Information box to learn more about \"Colonoscopy: What to Expect at Home.\"     If you do not have an account, please click on the \"Sign Up Now\" link.  Current as of: May 12, 2017  Content Version: 11.6  © 9687-7037 [employer]. Care instructions adapted under license by [employer]. If

## 2024-10-21 NOTE — ANESTHESIA PRE PROCEDURE
Department of Anesthesiology  Preprocedure Note       Name:  Cami Williamson   Age:  54 y.o.  :  1970                                          MRN:  [id number]         Date:  10/21/2024      Surgeon: Surgeon(s):  Khris Perez MD    Procedure: Procedure(s):  COLONOSCOPY    Medications prior to admission:   Prior to Admission medications    Medication Sig Start Date End Date Taking? Authorizing Provider   SUMAtriptan (IMITREX) 100 MG tablet TAKE 1 TABLET BY MOUTH AS NEEDED FOR MIGRAINE HEADACHE 6/10/24   Anna Sexton DO   Tirzepatide (MOUNJARO) 5 MG/0.5ML SOPN SC injection Inject 0.5 mLs into the skin once a week Indications: FDA approved tirzepatide (Zepbound) for overweight BMI >27 with one weight-related condition (high cholesterol) 3/9/24   Anna Sexton DO       Current medications:    No current facility-administered medications for this encounter.       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

## 2025-01-03 ENCOUNTER — HOSPITAL ENCOUNTER (OUTPATIENT)
Dept: CT IMAGING | Age: 55
Discharge: HOME | End: 2025-01-03
Attending: INTERNAL MEDICINE
Payer: COMMERCIAL

## 2025-01-03 DIAGNOSIS — C20 RECTAL CANCER (HCC): ICD-10-CM

## 2025-01-03 PROCEDURE — 6360000004 HC RX CONTRAST MEDICATION: Performed by: INTERNAL MEDICINE

## 2025-01-03 PROCEDURE — 74177 CT ABD & PELVIS W/CONTRAST: CPT

## 2025-01-03 RX ORDER — DIATRIZOATE MEGLUMINE AND DIATRIZOATE SODIUM 660; 100 MG/ML; MG/ML
12 SOLUTION ORAL; RECTAL
Status: DISCONTINUED | OUTPATIENT
Start: 2025-01-03 | End: 2025-01-04 | Stop reason: HOSPADM

## 2025-01-03 RX ORDER — IOPAMIDOL 755 MG/ML
75 INJECTION, SOLUTION INTRAVASCULAR
Status: COMPLETED | OUTPATIENT
Start: 2025-01-03 | End: 2025-01-03

## 2025-01-03 RX ADMIN — IOPAMIDOL 75 ML: 755 INJECTION, SOLUTION INTRAVENOUS at 09:26

## 2025-01-03 RX ADMIN — DIATRIZOATE MEGLUMINE AND DIATRIZOATE SODIUM 12 ML: 660; 100 LIQUID ORAL; RECTAL at 09:26

## 2025-02-25 SDOH — HEALTH STABILITY: PHYSICAL HEALTH: ON AVERAGE, HOW MANY MINUTES DO YOU ENGAGE IN EXERCISE AT THIS LEVEL?: 20 MIN

## 2025-02-25 SDOH — HEALTH STABILITY: PHYSICAL HEALTH: ON AVERAGE, HOW MANY DAYS PER WEEK DO YOU ENGAGE IN MODERATE TO STRENUOUS EXERCISE (LIKE A BRISK WALK)?: 3 DAYS

## 2025-02-28 ENCOUNTER — OFFICE VISIT (OUTPATIENT)
Dept: PRIMARY CARE CLINIC | Age: 55
End: 2025-02-28
Payer: COMMERCIAL

## 2025-02-28 VITALS
HEIGHT: 68 IN | OXYGEN SATURATION: 94 % | DIASTOLIC BLOOD PRESSURE: 64 MMHG | RESPIRATION RATE: 18 BRPM | TEMPERATURE: 98 F | HEART RATE: 85 BPM | SYSTOLIC BLOOD PRESSURE: 96 MMHG | WEIGHT: 188.4 LBS | BODY MASS INDEX: 28.55 KG/M2

## 2025-02-28 DIAGNOSIS — Z00.00 WELL ADULT EXAM: ICD-10-CM

## 2025-02-28 DIAGNOSIS — C78.5 SECONDARY MALIGNANT NEOPLASM OF LARGE INTESTINE AND RECTUM (HCC): ICD-10-CM

## 2025-02-28 DIAGNOSIS — Z12.31 ENCOUNTER FOR SCREENING MAMMOGRAM FOR MALIGNANT NEOPLASM OF BREAST: ICD-10-CM

## 2025-02-28 DIAGNOSIS — G43.909 MIGRAINE WITHOUT STATUS MIGRAINOSUS, NOT INTRACTABLE, UNSPECIFIED MIGRAINE TYPE: ICD-10-CM

## 2025-02-28 PROBLEM — M79.645 CHRONIC THUMB PAIN, LEFT: Status: RESOLVED | Noted: 2020-01-30 | Resolved: 2025-02-28

## 2025-02-28 PROBLEM — L72.9 SCALP CYST: Status: RESOLVED | Noted: 2020-02-20 | Resolved: 2025-02-28

## 2025-02-28 PROBLEM — G89.29 CHRONIC THUMB PAIN, LEFT: Status: RESOLVED | Noted: 2020-01-30 | Resolved: 2025-02-28

## 2025-02-28 PROCEDURE — 99396 PREV VISIT EST AGE 40-64: CPT | Performed by: FAMILY MEDICINE

## 2025-02-28 RX ORDER — SUMATRIPTAN SUCCINATE 100 MG/1
50-100 TABLET ORAL
Qty: 9 TABLET | Refills: 1 | Status: SHIPPED | OUTPATIENT
Start: 2025-02-28 | End: 2025-02-28

## 2025-02-28 SDOH — ECONOMIC STABILITY: FOOD INSECURITY: WITHIN THE PAST 12 MONTHS, YOU WORRIED THAT YOUR FOOD WOULD RUN OUT BEFORE YOU GOT MONEY TO BUY MORE.: NEVER TRUE

## 2025-02-28 SDOH — ECONOMIC STABILITY: FOOD INSECURITY: WITHIN THE PAST 12 MONTHS, THE FOOD YOU BOUGHT JUST DIDN'T LAST AND YOU DIDN'T HAVE MONEY TO GET MORE.: NEVER TRUE

## 2025-02-28 ASSESSMENT — PATIENT HEALTH QUESTIONNAIRE - PHQ9
SUM OF ALL RESPONSES TO PHQ QUESTIONS 1-9: 0
1. LITTLE INTEREST OR PLEASURE IN DOING THINGS: NOT AT ALL
SUM OF ALL RESPONSES TO PHQ QUESTIONS 1-9: 0
SUM OF ALL RESPONSES TO PHQ QUESTIONS 1-9: 0
SUM OF ALL RESPONSES TO PHQ9 QUESTIONS 1 & 2: 0
SUM OF ALL RESPONSES TO PHQ QUESTIONS 1-9: 0
2. FEELING DOWN, DEPRESSED OR HOPELESS: NOT AT ALL

## 2025-02-28 ASSESSMENT — ENCOUNTER SYMPTOMS
SINUS PAIN: 0
TROUBLE SWALLOWING: 0
BLOOD IN STOOL: 0
SHORTNESS OF BREATH: 0
ABDOMINAL PAIN: 0
CONSTIPATION: 0
DIARRHEA: 0
SINUS PRESSURE: 0
NAUSEA: 0
COLOR CHANGE: 0
CHEST TIGHTNESS: 0
VOMITING: 0

## 2025-02-28 NOTE — PROGRESS NOTES
Eliz Duron MD  [facility]   [facility]   [address], [address]  [facility] [address]  Phone: [phone]  Fax: [phone]        SUBJECTIVE:  Ms. Williamson is a 54 y.o. female here for the chief complaint below:    1. Well adult exam -- not fasting for labs today.  Due for tdap, shingrix, and pneumococcal.  Sees ob/gyn for womens health.  Getting mammo next month.  Doing weight watchers now, and restarting mounjaro.  Has used mounjaro in past with good results.  + GI side effects with semaglutide.  Getting mounjaro from weight watchers   2. Migraine without status migrainosus, not intractable, unspecified migraine type -- controlled on imitrex.  Uses sparingly.  Needs rx today.  No side effects   3. 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  Gets cscope every fall.   CT scan and labs every 6 months- Dr. Chatman.  No changes in BM.  Cancer was found on screening cscope in 2022   4. Encounter for screening mammogram for malignant neoplasm of breast          Past Medical History:   Diagnosis Date    Colorectal cancer (HCC) 07/19/2022    Cornea abrasion 2020    cornea tear     Migraines     uses imitrex as needed    PONV (postoperative nausea and vomiting)         Past Surgical History:   Procedure Laterality Date    ABDOMINOPLASTY      APPENDECTOMY  1983    ARM SURGERY N/A 5/18/2020    EXCISION SCALP CYST x TWO, EXCISION SKIN LESION LEFT LEG performed by Christiano Rod MD at [facility] OR    BREAST REDUCTION SURGERY  12-5-2012    AMBER BREAST REDUCTION; ABDOMINOPLASTY    COLECTOMY N/A 8/26/2022    ROBOTIC LOW ANTERIOR RESECTION WITH COLORECTAL ANASTAMOSIS, LAPAROSCOPIC MOBILIZATION OF SPLENIC FLEXURE performed by Pedro Mallory MD at [facility] OR    COLONOSCOPY N/A 7/19/2022    COLONOSCOPY WITH BIOPSY performed by Jose Hamilton DO at [facility] ENDOSCOPY    COLONOSCOPY N/A 7/19/2022

## 2025-02-28 NOTE — PATIENT INSTRUCTIONS
Please call our scheduling department at [phone] to get your mammogram completed    Will contact you with results    Return in 1 year or as needed

## 2025-03-06 ENCOUNTER — TELEPHONE (OUTPATIENT)
Dept: WOMENS IMAGING | Age: 55
End: 2025-03-06

## 2025-03-06 NOTE — TELEPHONE ENCOUNTER
Left a message for the patient- trying to schedule an appointment for OVERDUE screening mammogram. Phone number was provided for patient to contact scheduling.

## 2025-05-30 ENCOUNTER — HOSPITAL ENCOUNTER (OUTPATIENT)
Dept: LAB | Age: 55
Discharge: HOME | End: 2025-05-30
Payer: COMMERCIAL

## 2025-05-30 ENCOUNTER — PATIENT MESSAGE (OUTPATIENT)
Dept: PRIMARY CARE CLINIC | Age: 55
End: 2025-05-30

## 2025-05-30 DIAGNOSIS — Z11.1 TUBERCULOSIS SCREENING: ICD-10-CM

## 2025-05-30 DIAGNOSIS — Z11.1 TUBERCULOSIS SCREENING: Primary | ICD-10-CM

## 2025-05-30 PROCEDURE — 86480 TB TEST CELL IMMUN MEASURE: CPT

## 2025-06-02 LAB
GAMMA INTERFERON BACKGROUND BLD IA-ACNC: 0.03 IU/ML
M TB IFN-G BLD-IMP: NEGATIVE
M TB IFN-G CD4+ BCKGRND COR BLD-ACNC: 0.04 IU/ML
M TB IFN-G CD4+CD8+ BCKGRND COR BLD-ACNC: 0.03 IU/ML
MITOGEN IGNF BCKGRD COR BLD-ACNC: 9.97 IU/ML

## 2025-06-06 ENCOUNTER — RESULTS FOLLOW-UP (OUTPATIENT)
Dept: PRIMARY CARE CLINIC | Age: 55
End: 2025-06-06

## 2025-07-17 ENCOUNTER — HOSPITAL ENCOUNTER (OUTPATIENT)
Dept: CT IMAGING | Age: 55
Discharge: HOME | End: 2025-07-17
Attending: INTERNAL MEDICINE
Payer: COMMERCIAL

## 2025-07-17 DIAGNOSIS — C20 RECTAL CANCER (HCC): ICD-10-CM

## 2025-07-17 PROCEDURE — 6360000004 HC RX CONTRAST MEDICATION: Performed by: INTERNAL MEDICINE

## 2025-07-17 PROCEDURE — 74177 CT ABD & PELVIS W/CONTRAST: CPT

## 2025-07-17 RX ORDER — IOPAMIDOL 755 MG/ML
75 INJECTION, SOLUTION INTRAVASCULAR
Status: COMPLETED | OUTPATIENT
Start: 2025-07-17 | End: 2025-07-17

## 2025-07-17 RX ORDER — DIATRIZOATE MEGLUMINE AND DIATRIZOATE SODIUM 660; 100 MG/ML; MG/ML
12 SOLUTION ORAL; RECTAL
Status: DISCONTINUED | OUTPATIENT
Start: 2025-07-17 | End: 2025-07-18 | Stop reason: HOSPADM

## 2025-07-17 RX ADMIN — IOPAMIDOL 75 ML: 755 INJECTION, SOLUTION INTRAVENOUS at 08:43

## 2025-07-17 RX ADMIN — DIATRIZOATE MEGLUMINE AND DIATRIZOATE SODIUM 12 ML: 660; 100 LIQUID ORAL; RECTAL at 08:43

## (undated) DEVICE — MAJOR SET UP PK

## (undated) DEVICE — ENDOSCOPIC KIT 2 12 FT OP4 DE2 GWN SYR

## (undated) DEVICE — APPLICATOR MEDICATED 26 CC SOLUTION HI LT ORNG CHLORAPREP

## (undated) DEVICE — TRAP SPEC POLYPR SGL CHMBR FN MESH SCRN

## (undated) DEVICE — SOLUTION INJ LR VISIV 1000ML BG

## (undated) DEVICE — STAPLER INT DIA29MM CLS STPL H1.5-2.2MM OPN LEG L5.2MM 26

## (undated) DEVICE — SYRINGE CATH TIP 50ML

## (undated) DEVICE — SYRINGE MED 10ML SLIP TIP BLNT FILL AND LUERLOCK DISP

## (undated) DEVICE — BLADELESS OBTURATOR: Brand: WECK VISTA

## (undated) DEVICE — SOLUTION BOWL: Brand: KENDALL

## (undated) DEVICE — TOTAL TRAY, 16FR 10ML SIL FOLEY, URN: Brand: MEDLINE

## (undated) DEVICE — CATHETER DRNGE 34FR 2 EYE PROPORTIONATE HD DISP FOR

## (undated) DEVICE — SOLUTION IV IRRIG 500ML 0.9% SODIUM CHL 2F7123

## (undated) DEVICE — SPONGE,LAP,18"X18",DLX,XR,ST,5/PK,40/PK: Brand: MEDLINE

## (undated) DEVICE — SEALER/DIVIDER LAP SHFT L44CM JAW APER 11.4MM 315DEG ROT

## (undated) DEVICE — DRAPE,UNDERBUTTOCKS,PCH,STERILE: Brand: MEDLINE

## (undated) DEVICE — STAPLER EXT 65MM S STL AUTO DISP PURSTRING

## (undated) DEVICE — COULTER 5C CELL CONTROL: Brand: COULTER 5C

## (undated) DEVICE — PREMIUM WET SKIN PREP TRAY: Brand: MEDLINE INDUSTRIES, INC.

## (undated) DEVICE — SEAL

## (undated) DEVICE — SUTURE VCRL SZ 0 L27IN ABSRB UD L36MM CT-1 1/2 CIR J260H

## (undated) DEVICE — SUTURE MCRYL SZ 4-0 L27IN ABSRB UD L19MM PS-2 1/2 CIR PRIM Y426H

## (undated) DEVICE — DRAPE,T,LAPARO,TRANS,STERILE: Brand: MEDLINE

## (undated) DEVICE — SYRINGE MED 10ML LUERLOCK TIP W/O SFTY DISP

## (undated) DEVICE — INTENDED FOR TISSUE SEPARATION, AND OTHER PROCEDURES THAT REQUIRE A SHARP SURGICAL BLADE TO PUNCTURE OR CUT.: Brand: BARD-PARKER ® CARBON RIB-BACK BLADES

## (undated) DEVICE — TROCAR: Brand: KII FIOS FIRST ENTRY

## (undated) DEVICE — APPLICATOR PREP 26ML 0.7% IOD POVACRYLEX 74% ISO ALC ST

## (undated) DEVICE — ADHESIVE SKIN CLSR 0.7ML TOP DERMBND ADV

## (undated) DEVICE — SUTURE VCRL SZ 3-0 L18IN ABSRB UD L26MM SH 1/2 CIR J864D

## (undated) DEVICE — PORT INSERTION: Brand: MEDLINE INDUSTRIES, INC.

## (undated) DEVICE — ELECTRODE,ECG,STRESS,FOAM,3PK: Brand: MEDLINE

## (undated) DEVICE — Device

## (undated) DEVICE — TOWEL,STOP FLAG GOLD N-W: Brand: MEDLINE

## (undated) DEVICE — SUTURE BOOT: Brand: DEROYAL

## (undated) DEVICE — CLEANER,CAUTERY TIP,2X2",STERILE: Brand: MEDLINE

## (undated) DEVICE — VESSEL SEALER EXTEND: Brand: ENDOWRIST

## (undated) DEVICE — DISPOSABLE BIOPSY FORCEPS: Brand: DISPOSABLE BIOPSY FORCEPS

## (undated) DEVICE — NEEDLE HYPO 22GA L1.5IN BLK POLYPR HUB S STL REG BVL STR

## (undated) DEVICE — TIP-UP FENESTRATED GRASPER: Brand: ENDOWRIST

## (undated) DEVICE — REDUCER: Brand: ENDOWRIST

## (undated) DEVICE — LAPAROSCOPIC ACCESS SYSTEM: Brand: ALEXIS LAPAROSCOPIC SYSTEM WITH KII FIOS FIRST ENTRY

## (undated) DEVICE — LEGGINGS, PAIR, 33X51 XL, STERILE: Brand: MEDLINE

## (undated) DEVICE — SNARE ENDOSCP L240CM SHTH DIA24MM LOOP W10MM POLYP RND REINF

## (undated) DEVICE — CANNULA NSL 13FT TUBE AD ETCO2 DIV SAMP M

## (undated) DEVICE — CANNULA SAMP CO2 AD GRN 7FT CO2 AND 7FT O2 TBNG UNIV CONN

## (undated) DEVICE — CANNULA NSL AD TBNG L7FT PVC STR NONFLARED PRNG O2 DEL W STD

## (undated) DEVICE — GENERAL: Brand: MEDLINE INDUSTRIES, INC.

## (undated) DEVICE — STERILE PVP: Brand: MEDLINE INDUSTRIES, INC.

## (undated) DEVICE — STAPLER 60 RELOAD BLUE: Brand: SUREFORM

## (undated) DEVICE — GLOVE ORANGE PI 8 1/2   MSG9085

## (undated) DEVICE — GLOVE SURG SZ 7 CRM LTX FREE POLYISOPRENE POLYMER BEAD ANTI

## (undated) DEVICE — SYSTEM SMK EVAC LAP TBNG FILTER HSNG BENT STYL PNK SEE CLR

## (undated) DEVICE — ELECTRODE PT RET AD L9FT HI MOIST COND ADH HYDRGEL CORDED

## (undated) DEVICE — FENESTRATED BIPOLAR FORCEPS: Brand: ENDOWRIST

## (undated) DEVICE — TIP SUCT DIA12FR W STYL CTRL VENT DISPOSABLE FRAZ

## (undated) DEVICE — PACK,UNIVERSAL,SPLIT,II,AURORA: Brand: MEDLINE

## (undated) DEVICE — CANNULA SEAL

## (undated) DEVICE — ROBOTIC: Brand: MEDLINE INDUSTRIES, INC.

## (undated) DEVICE — STAPLER 60: Brand: SUREFORM

## (undated) DEVICE — 3M™ IOBAN™ 2 ANTIMICROBIAL INCISE DRAPE 6648EZ: Brand: IOBAN™ 2

## (undated) DEVICE — GAUZE,SPONGE,4"X4",8PLY,STRL,LF,10/TRAY: Brand: MEDLINE

## (undated) DEVICE — ARM DRAPE

## (undated) DEVICE — SOLUTION IV 1000ML 0.9% SOD CHL PH 5 INJ USP VIAFLX PLAS

## (undated) DEVICE — GOWN,AURORA,NONREINF,RAGLAN,XXL,STERILE: Brand: MEDLINE

## (undated) DEVICE — PUMP SUC IRR TBNG L10FT W/ HNDPC ASSEMB STRYKEFLOW 2

## (undated) DEVICE — SOLUTION ANTIFOG VIS SYS CLEARIFY LAPSCP

## (undated) DEVICE — 40583 XL ADVANCED TRENDELENBURG POSITIONING KIT: Brand: 40583 XL ADVANCED TRENDELENBURG POSITIONING KIT

## (undated) DEVICE — NEEDLE HYPO 25GA L1.5IN BLU POLYPR HUB S STL REG BVL STR